# Patient Record
Sex: MALE | Race: BLACK OR AFRICAN AMERICAN | NOT HISPANIC OR LATINO | Employment: FULL TIME | URBAN - METROPOLITAN AREA
[De-identification: names, ages, dates, MRNs, and addresses within clinical notes are randomized per-mention and may not be internally consistent; named-entity substitution may affect disease eponyms.]

---

## 2017-02-02 ENCOUNTER — OFFICE VISIT (OUTPATIENT)
Dept: FAMILY MEDICINE | Facility: CLINIC | Age: 37
End: 2017-02-02

## 2017-02-02 VITALS
BODY MASS INDEX: 25.29 KG/M2 | WEIGHT: 190.8 LBS | HEIGHT: 73 IN | TEMPERATURE: 97.8 F | HEART RATE: 104 BPM | DIASTOLIC BLOOD PRESSURE: 94 MMHG | OXYGEN SATURATION: 100 % | SYSTOLIC BLOOD PRESSURE: 154 MMHG | RESPIRATION RATE: 18 BRPM

## 2017-02-02 DIAGNOSIS — K86.2 PANCREATIC CYST: ICD-10-CM

## 2017-02-02 DIAGNOSIS — F64.0 GENDER DYSPHORIA IN ADULT: Primary | ICD-10-CM

## 2017-02-02 DIAGNOSIS — I10 BENIGN ESSENTIAL HYPERTENSION: ICD-10-CM

## 2017-02-02 LAB
ALBUMIN SERPL-MCNC: 4.8 MG/DL (ref 3.8–5)
ALP SERPL-CCNC: 54.5 U/L (ref 31.7–110.7)
ALT SERPL-CCNC: 26.1 U/L (ref 0–45)
AST SERPL-CCNC: 22.7 U/L (ref 0–55)
BILIRUB SERPL-MCNC: 0.6 MG/DL (ref 0.2–1.3)
BUN SERPL-MCNC: 8.2 MG/DL (ref 7–21)
CALCIUM SERPL-MCNC: 10 MG/DL (ref 8.5–10.1)
CHLORIDE SERPLBLD-SCNC: 101 MMOL/L (ref 98–110)
CO2 SERPL-SCNC: 31.7 MMOL/L (ref 20–32)
CREAT SERPL-MCNC: 0.9 MG/DL (ref 0.7–1.3)
CREAT UR-MCNC: 63 MG/DL
GFR SERPL CREATININE-BSD FRML MDRD: 101.5 ML/MIN/1.7 M2
GLUCOSE SERPL-MCNC: 105.9 MG'DL (ref 70–99)
MICROALBUMIN UR-MCNC: <5 MG/L
MICROALBUMIN/CREAT UR: NORMAL MG/G CR (ref 0–17)
POTASSIUM SERPL-SCNC: 4.2 MMOL/DL (ref 3.3–4.5)
PROT SERPL-MCNC: 7.8 G/DL (ref 6.8–8.8)
SODIUM SERPL-SCNC: 138.2 MMOL/L (ref 132.6–141.4)
TSH SERPL DL<=0.005 MIU/L-ACNC: 0.8 MU/L (ref 0.4–4)

## 2017-02-02 RX ORDER — LISINOPRIL 10 MG/1
10 TABLET ORAL DAILY
Qty: 30 TABLET | Refills: 1 | Status: SHIPPED | OUTPATIENT
Start: 2017-02-02 | End: 2017-02-28

## 2017-02-02 RX ORDER — FLUTICASONE PROPIONATE 50 MCG
SPRAY, SUSPENSION (ML) NASAL
COMMUNITY
Start: 2017-01-27 | End: 2018-11-20

## 2017-02-02 RX ORDER — SPIRONOLACTONE 100 MG/1
100 TABLET, FILM COATED ORAL
COMMUNITY
Start: 2016-11-22 | End: 2020-11-20

## 2017-02-02 NOTE — MR AVS SNAPSHOT
After Visit Summary   2017    Gordon Stack    MRN: [id number]           Patient Information     Date Of Birth          1980        Visit Information        Provider Department      2017 8:00 AM Pierre Camara DO [facility]        Today's Diagnoses     Gender dysphoria in adult    -  1     Pancreatic cyst         Benign essential hypertension            Follow-ups after your visit        Who to contact     Please call your clinic at [phone] to:    Ask questions about your health    Make or cancel appointments    Discuss your medicines    Learn about your test results    Speak to your doctor   If you have compliments or concerns about an experience at your clinic, or if you wish to file a complaint, please contact [facility] Patient Relations at [phone] or email us at [email or web address]         Additional Information About Your Visit        MyChart Information     Spindlet is an electronic gateway that provides easy, online access to your medical records. With [employer]od[employer], you can request a clinic appointment, read your test results, renew a prescription or communicate with your care team.     To sign up for Spindlet visit the website at [email or web address]   You will be asked to enter the access code listed below, as well as some personal information. Please follow the directions to create your username and password.     Your access code is: [email or web address]  Expires: 5/3/2017  8:56 AM     Your access code will  in 90 days. If you need help or a new code, please contact your [facility] or call [phone] for assistance.        Care EveryWhere ID     This is your Care EveryWhere ID. This could be used by other organizations to access your Center medical records  [id number]        Your Vitals Were     Pulse Temperature Respirations Height BMI (Body Mass Index) Pulse Oximetry     "104 97.8  F (36.6  C) (Oral) 18 6' 1\" (185.4 cm) 25.18 kg/m2 100%       Blood Pressure from Last 3 Encounters:   02/02/17 154/94   08/28/16 127/80    Weight from Last 3 Encounters:   02/02/17 190 lb 12.8 oz (86.546 kg)   08/27/16 194 lb 0.1 oz (88 kg)              We Performed the Following     Albumin Random Urine Quantitative     Comprehensive Metabolic Panel ([facility])     TSH with free T4 reflex          Today's Medication Changes          These changes are accurate as of: 2/2/17  8:56 AM.  If you have any questions, ask your nurse or doctor.               Start taking these medicines.        Dose/Directions    lisinopril 10 MG tablet   Commonly known as:  PRINIVIL/ZESTRIL   Used for:  Benign essential hypertension   Started by:  Pierre Camara DO        Dose:  10 mg   Take 1 tablet (10 mg) by mouth daily   Quantity:  30 tablet   Refills:  1         Stop taking these medicines if you haven't already. Please contact your care team if you have questions.     sucralfate 1 GM/10ML suspension   Commonly known as:  CARAFATE   Stopped by:  Pierre Camara DO                Where to get your medicines      These medications were sent to [facility] [place], [place] - [address]   [address] [place] [address]     Phone:  [phone]    - lisinopril 10 MG tablet             Primary Care Provider Office Phone # Fax #    Pierre Camara -[phone]       [facility] [address] [facility] [address]        Thank you!     Thank you for choosing [facility]  for your care. Our goal is always to provide you with excellent care. Hearing back from our patients is one way we can continue to improve our services. Please take a few minutes to complete the written survey that you may receive in the mail after your visit with us. Thank you!             Your Updated Medication List - Protect others around you: Learn how to safely use, store and throw away your " medicines at www.disposemymeds.org.          This list is accurate as of: 2/2/17  8:56 AM.  Always use your most recent med list.                   Brand Name Dispense Instructions for use    fluticasone 50 MCG/ACT spray    FLONASE         lisinopril 10 MG tablet    PRINIVIL/ZESTRIL    30 tablet    Take 1 tablet (10 mg) by mouth daily       spironolactone 100 MG tablet    ALDACTONE     Take 100 mg by mouth

## 2017-02-02 NOTE — PROGRESS NOTES
HPI:       Gordon Stack is a 36 year old who presents for the following  Patient presents with:  Establish Care: New pt, Etablish Care, Blood pressure has been hiugh for 6months+    Taking Estradiol and spironolactone x 4 months.  Estradiol x 2 months. Was started at [facility]. Goal is to transition to female.  On weekend may be dressed more feminine.  Started having gender identity concerns at age 6 or 7.  No mental health issues.  Would like to continue with hormone therapy at [facility].     Here for high blood pressure.  Has had it for 4 or 5 months ago.  Before that it had been high as well but no diagnosis. No difference with home monitoring before or after the estradiol.      No family issues of blood pressure elevation. Brother with asthma. Grandfather with prostate cancer at age 95.     He checks his blood pressure at home and it is 150's over 94.  Lowest has been 130/80.     No froth with urination.    Had some abdominal pain in 8/2016.  Went to the ED and was found to have a small, likely incidental cyst on the pancreas. MRI recommended 8/2017.       Problem, Medication and Allergy Lists were reviewed and are current.  Patient is a ne patient so family, past, and social history reviewed.     Past Medical History   Diagnosis Date     Allergic state      Social History     Social History     Marital Status: Single     Spouse Name: N/A     Number of Children: N/A     Years of Education: N/A     Occupational History     Not on file.     Social History Main Topics     Smoking status: Current Every Day Smoker     Smokeless tobacco: Not on file     Alcohol Use: Yes     Drug Use: No     Sexual Activity: Not Currently     Other Topics Concern     Not on file     Social History Narrative     Family History   Problem Relation Age of Onset     Asthma Brother               Review of Systems:   Review of Systems   Constitutional: Negative for fever and chills.   HENT: Negative for congestion and sore  "throat.    Eyes: Negative for visual disturbance.   Respiratory: Negative for cough and shortness of breath.    Cardiovascular: Negative for chest pain.   Gastrointestinal: Negative for nausea, vomiting, diarrhea and constipation.   Genitourinary: Negative for difficulty urinating.   Musculoskeletal: Negative for back pain.   Skin: Negative for rash.   Neurological: Negative for dizziness, light-headedness and headaches.   Psychiatric/Behavioral: Negative for dysphoric mood.             Physical Exam:   Patient Vitals for the past 24 hrs:   BP Temp Temp src Pulse Resp SpO2 Height Weight   02/02/17 0812 (!) 154/94 mmHg - - - - - - -   02/02/17 0811 (!) 162/94 mmHg 97.8  F (36.6  C) Oral 104 18 100 % 6' 1\" (185.4 cm) 190 lb 12.8 oz (86.546 kg)     Body mass index is 25.18 kg/(m^2).  Vital signs normal except htn     Physical Exam   Constitutional: He is oriented to person, place, and time. He appears well-developed and well-nourished.   HENT:   Head: Normocephalic and atraumatic.   Eyes: Conjunctivae and EOM are normal.   Cardiovascular: Normal rate, regular rhythm and normal heart sounds.  Exam reveals no gallop and no friction rub.    No murmur heard.  Pulmonary/Chest: Effort normal and breath sounds normal. No respiratory distress. He has no wheezes. He has no rales. He exhibits no tenderness.   Abdominal: He exhibits no distension.   Neurological: He is alert and oriented to person, place, and time. No cranial nerve deficit.   Skin: Skin is warm and dry.   Psychiatric: He has a normal mood and affect. His behavior is normal. Judgment and thought content normal.         Results:     Results for orders placed or performed in visit on 02/02/17   Comprehensive Metabolic Panel ([facility])   Result Value Ref Range    Albumin 4.8 3.8 - 5.0 mg/dL    Alkaline Phosphatase 54.5 31.7 - 110.7 U/L    ALT 26.1 0.0 - 45.0 U/L    AST 22.7 0.0 - 55.0 U/L    Bilirubin Total 0.6 0.2 - 1.3 mg/dL    Urea Nitrogen 8.2 7.0 - 21.0 mg/dL    " Calcium 10.0 8.5 - 10.1 mg/dL    Chloride 101.0 98.0 - 110.0 mmol/L    Carbon Dioxide 31.7 20.0 - 32.0 mmol/L    Creatinine 0.9 0.7 - 1.3 mg/dL    Glucose 105.9 (H) 70.0 - 99.0 mg'dL    Potassium 4.2 3.3 - 4.5 mmol/dL    Sodium 138.2 132.6 - 141.4 mmol/L    Protein Total 7.8 6.8 - 8.8 g/dL    GFR Estimate 101.5 mL/min/1.7 m2    GFR Estimate If Black 122.8 mL/min/1.7 m2   TSH with free T4 reflex   Result Value Ref Range    TSH 0.80 0.40 - 4.00 mU/L   Albumin Random Urine Quantitative   Result Value Ref Range    Creatinine Urine 63 mg/dL    Albumin Urine mg/L <5 mg/L    Albumin Urine mg/g Cr Unable to calculate due to low value 0 - 17 mg/g Cr       Assessment and Plan     1. Gender dysphoria in adult  Continue current dosing.  Estradiol unlikely to be involved in bp based on having htn before and after starting.     2. Pancreatic cyst  Will need MRI in 8/17    3. Benign essential hypertension  Normal lab results. Unlikely related to estradiol given presence prior to starting hrt. Will start on lisinopril.  Return 1-2 weeks for bp recheck.   - Comprehensive Metabolic Panel ([facility])  - TSH with free T4 reflex  - lisinopril (PRINIVIL/ZESTRIL) 10 MG tablet; Take 1 tablet (10 mg) by mouth daily  Dispense: 30 tablet; Refill: 1  - Albumin Random Urine Quantitative  There are no discontinued medications.  Options for treatment and follow-up care were reviewed with the patient. Gordon Stack  engaged in the decision making process and verbalized understanding of the options discussed and agreed with the final plan.    Pierre Camara DO

## 2017-02-02 NOTE — PROGRESS NOTES
Preceptor Attestation:   Patient seen and discussed with the resident. Assessment and plan reviewed with resident and agreed upon.   Supervising Physician:  Steven Mcmahan MD MD  [facility]

## 2017-02-06 ASSESSMENT — ENCOUNTER SYMPTOMS
BACK PAIN: 0
DIZZINESS: 0
VOMITING: 0
HEADACHES: 0
DIFFICULTY URINATING: 0
FEVER: 0
COUGH: 0
SORE THROAT: 0
DIARRHEA: 0
CONSTIPATION: 0
SHORTNESS OF BREATH: 0
DYSPHORIC MOOD: 0
CHILLS: 0
NAUSEA: 0
LIGHT-HEADEDNESS: 0

## 2017-02-28 ENCOUNTER — OFFICE VISIT (OUTPATIENT)
Dept: FAMILY MEDICINE | Facility: CLINIC | Age: 37
End: 2017-02-28

## 2017-02-28 VITALS
WEIGHT: 200 LBS | OXYGEN SATURATION: 100 % | TEMPERATURE: 98.7 F | BODY MASS INDEX: 26.39 KG/M2 | HEART RATE: 91 BPM | RESPIRATION RATE: 18 BRPM | DIASTOLIC BLOOD PRESSURE: 87 MMHG | SYSTOLIC BLOOD PRESSURE: 150 MMHG

## 2017-02-28 DIAGNOSIS — I10 BENIGN ESSENTIAL HYPERTENSION: Primary | ICD-10-CM

## 2017-02-28 RX ORDER — LISINOPRIL 10 MG/1
10 TABLET ORAL DAILY
Qty: 30 TABLET | Refills: 1 | Status: SHIPPED | OUTPATIENT
Start: 2017-02-28 | End: 2017-08-23

## 2017-02-28 NOTE — PROGRESS NOTES
HPI:       Gordon Stack is a 36 year old who presents for the following  Patient presents with:  RECHECK: BP    Would like to recheck bp. Has been taking medication well without any problem.  He had elevated blood pressure readings in the past prior to starting estrogen and it has not gone up.  He does not check his blood pressure at home.          Problem, Medication and Allergy Lists were reviewed and are current.  Patient is an established patient of this clinic.         Review of Systems:   Review of Systems   Constitutional: Negative for chills and fever.   HENT: Negative for congestion and sore throat.    Eyes: Negative for visual disturbance.   Respiratory: Negative for cough and shortness of breath.    Cardiovascular: Negative for chest pain.   Gastrointestinal: Negative for constipation, diarrhea, nausea and vomiting.   Genitourinary: Negative for difficulty urinating.   Musculoskeletal: Negative for back pain.   Skin: Negative for rash.   Neurological: Negative for dizziness, light-headedness and headaches.   Psychiatric/Behavioral: Negative for dysphoric mood.             Physical Exam:   Patient Vitals for the past 24 hrs:   BP Temp Temp src Pulse Resp SpO2 Weight   02/28/17 1344 150/87 - - - - - -   02/28/17 1326 152/82 98.7  F (37.1  C) Oral 91 18 100 % 200 lb (90.7 kg)     Body mass index is 26.39 kg/(m^2).  Vitals were reviewed and blood pressure elevated.      Physical Exam   Constitutional: He appears well-developed.   HENT:   Head: Normocephalic and atraumatic.   Eyes: Conjunctivae are normal.   Pulmonary/Chest: No respiratory distress.   Skin: Skin is warm and dry. No erythema.   Psychiatric: He has a normal mood and affect. His behavior is normal. Thought content normal.         Results:     No investigations ordered today    Assessment and Plan     1. Benign essential hypertension  Will get a 24 hour monitor to check for white coat hypertension. Does not appear to be related to the  estrogen therapy and no concerns about continuing that.    - 24 Hour Blood Pressure Monitor - Adult; Future  - lisinopril (PRINIVIL/ZESTRIL) 10 MG tablet; Take 1 tablet (10 mg) by mouth daily  Dispense: 30 tablet; Refill: 1  There are no discontinued medications.  Options for treatment and follow-up care were reviewed with the patient. Gordon Stack  engaged in the decision making process and verbalized understanding of the options discussed and agreed with the final plan.    Pierre Camara, DO

## 2017-02-28 NOTE — MR AVS SNAPSHOT
After Visit Summary   2017    Gordon Stack    MRN: [id number]           Patient Information     Date Of Birth          1980        Visit Information        Provider Department      2017 1:20 PM Pierre Camara DO [facility]        Today's Diagnoses     Benign essential hypertension    -  1       Follow-ups after your visit        Who to contact     Please call your clinic at [phone] to:    Ask questions about your health    Make or cancel appointments    Discuss your medicines    Learn about your test results    Speak to your doctor   If you have compliments or concerns about an experience at your clinic, or if you wish to file a complaint, please contact [facility] Patient Relations at [phone] or email us at [email or web address]         Additional Information About Your Visit        MyChart Information     [employer] is an electronic gateway that provides easy, online access to your medical records. With [employer], you can request a clinic appointment, read your test results, renew a prescription or communicate with your care team.     To sign up for [employer] visit the website at [email or web address]   You will be asked to enter the access code listed below, as well as some personal information. Please follow the directions to create your username and password.     Your access code is: [email or web address]  Expires: 5/3/2017  8:56 AM     Your access code will  in 90 days. If you need help or a new code, please contact your [facility] or call [phone] for assistance.        Care EveryWhere ID     This is your Care EveryWhere ID. This could be used by other organizations to access your [facility] medical records  [id number]        Your Vitals Were     Pulse Temperature Respirations Pulse Oximetry BMI (Body Mass Index)       91 98.7  F (37.1  C) (Oral) 18 100% 26.39 kg/m2        Blood  Pressure from Last 3 Encounters:   02/28/17 150/87   02/02/17 (!) 154/94   08/28/16 127/80    Weight from Last 3 Encounters:   02/28/17 200 lb (90.7 kg)   02/02/17 190 lb 12.8 oz (86.5 kg)   08/27/16 194 lb 0.1 oz (88 kg)                 Where to get your medicines      These medications were sent to [facility] [place], [place] - [address] [facility] [address]     Phone:  [phone]     lisinopril 10 MG tablet          Primary Care Provider Office Phone # Fax #    Pierre Camara [phone]       [facility] [address] [facility] [address]        Thank you!     Thank you for choosing [facility]  for your care. Our goal is always to provide you with excellent care. Hearing back from our patients is one way we can continue to improve our services. Please take a few minutes to complete the written survey that you may receive in the mail after your visit with us. Thank you!             Your Updated Medication List - Protect others around you: Learn how to safely use, store and throw away your medicines at www.disposemymeds.org.          This list is accurate as of: 2/28/17 11:59 PM.  Always use your most recent med list.                   Brand Name Dispense Instructions for use    fluticasone 50 MCG/ACT spray    FLONASE         lisinopril 10 MG tablet    PRINIVIL/ZESTRIL    30 tablet    Take 1 tablet (10 mg) by mouth daily       spironolactone 100 MG tablet    ALDACTONE     Take 100 mg by mouth

## 2017-02-28 NOTE — PROGRESS NOTES
Preceptor Attestation:   Patient seen and discussed with the resident. Assessment and plan reviewed with resident and agreed upon.   Supervising Physician:  Gisele Jordan MD  [facility]

## 2017-03-07 ASSESSMENT — ENCOUNTER SYMPTOMS
DYSPHORIC MOOD: 0
DIFFICULTY URINATING: 0
DIZZINESS: 0
FEVER: 0
CHILLS: 0
VOMITING: 0
LIGHT-HEADEDNESS: 0
HEADACHES: 0
SORE THROAT: 0
CONSTIPATION: 0
BACK PAIN: 0
SHORTNESS OF BREATH: 0
DIARRHEA: 0
NAUSEA: 0
COUGH: 0

## 2017-03-08 NOTE — PATIENT INSTRUCTIONS
BP Monitor referral  [phone]  Contacted patient twice and left 2 msg's with direct contact info- haven't heard back anything yet!           Thanks!     H     Sent letter to patient home

## 2017-03-17 ENCOUNTER — OFFICE VISIT (OUTPATIENT)
Dept: FAMILY MEDICINE | Facility: CLINIC | Age: 37
End: 2017-03-17

## 2017-03-17 VITALS
OXYGEN SATURATION: 99 % | SYSTOLIC BLOOD PRESSURE: 147 MMHG | TEMPERATURE: 98.3 F | DIASTOLIC BLOOD PRESSURE: 82 MMHG | RESPIRATION RATE: 18 BRPM | BODY MASS INDEX: 26.64 KG/M2 | HEIGHT: 73 IN | HEART RATE: 81 BPM | WEIGHT: 201 LBS

## 2017-03-17 DIAGNOSIS — F64.0 GENDER DYSPHORIA IN ADULT: ICD-10-CM

## 2017-03-17 DIAGNOSIS — R14.3 FLATULENCE, ERUCTATION AND GAS PAIN: Primary | ICD-10-CM

## 2017-03-17 DIAGNOSIS — R14.1 FLATULENCE, ERUCTATION AND GAS PAIN: Primary | ICD-10-CM

## 2017-03-17 DIAGNOSIS — K59.00 CONSTIPATION, UNSPECIFIED CONSTIPATION TYPE: ICD-10-CM

## 2017-03-17 DIAGNOSIS — R14.2 FLATULENCE, ERUCTATION AND GAS PAIN: Primary | ICD-10-CM

## 2017-03-17 RX ORDER — POLYETHYLENE GLYCOL 3350 17 G/17G
1 POWDER, FOR SOLUTION ORAL 2 TIMES DAILY PRN
Qty: 510 G | Refills: 1 | Status: SHIPPED | OUTPATIENT
Start: 2017-03-17 | End: 2019-11-19

## 2017-03-17 RX ORDER — SIMETHICONE 125 MG
1 CAPSULE ORAL 4 TIMES DAILY PRN
Qty: 120 CAPSULE | Refills: 0 | Status: SHIPPED | OUTPATIENT
Start: 2017-03-17 | End: 2019-11-19

## 2017-03-17 RX ORDER — ESTRADIOL 2 MG/1
2 TABLET ORAL DAILY
Qty: 30 TABLET | Refills: 0
Start: 2017-03-17 | End: 2020-10-20

## 2017-03-17 ASSESSMENT — ENCOUNTER SYMPTOMS
SHORTNESS OF BREATH: 0
VOMITING: 0
CONSTIPATION: 1
FEVER: 0
RECTAL PAIN: 0
NAUSEA: 1
COUGH: 0
ABDOMINAL DISTENTION: 1
DYSURIA: 0
ABDOMINAL PAIN: 1
DIARRHEA: 0
CHILLS: 0
BLOOD IN STOOL: 0

## 2017-03-17 NOTE — MR AVS SNAPSHOT
After Visit Summary   3/17/2017    Gordon Stack    MRN: [id number]           Patient Information     Date Of Birth          1980        Visit Information        Provider Department      3/17/2017 8:00 AM Gisele Camara MD [facility]        Today's Diagnoses     Flatulence, eructation and gas pain    -  1    Constipation, unspecified constipation type          Care Instructions    Here is the plan from today's visit    1. Flatulence, eructation and gas pain  - Simethicone 125 MG CAPS; Take 1 capsule by mouth 4 times daily as needed  Dispense: 120 capsule; Refill: 0    2. Constipation, unspecified constipation type  - polyethylene glycol (MIRALAX) powder; Take 17 g (1 capful) by mouth 2 times daily as needed for constipation  Dispense: 510 g; Refill: 1      Please call or return to clinic in next week if your symptoms don't go away.    Follow up plan  Please make a clinic appointment for follow up with me (GISELE CAMARA) or Pierre Camara as needed for follow-up.      Constipation (Adult)  Constipation means that you have bowel movements that are less frequent than usual. Stools often become very hard and difficult to pass.  Constipation is very common. At some point in life it affects almost everyone. Since everyone's bowel habits are different, what is constipation to one person may not be to another. Your healthcare provider may do tests to diagnose constipation. It depends on what he or she finds when evaluating you.    Symptoms of constipation include:    Abdominal pain    Bloating    Vomiting    Painful bowel movements    Itching, swelling, bleeding, or pain around the anus  Causes  Constipation can have many causes. These include:    Diet low in fiber    Too much dairy    Not drinking enough liquids    Lack of exercise or physical activity. This is especially true for older adults.    Changes in lifestyle or daily routine, including pregnancy, aging,  work, and travel    Frequent use or misuse of laxatives    Ignoring the urge to have a bowel movement or delaying it until later    Medicines, such as certain prescription pain medicines, iron supplements, antacids, certain antidepressants, and calcium supplements    Diseases like irritable bowel syndrome, bowel obstructions, stroke, diabetes, thyroid disease, Parkinson disease, hemorrhoids, and colon cancer  Complications  Potential complications of constipation can include:    Hemorrhoids    Rectal bleeding from hemorrhoids or anal fissures (skin tears)    Hernias    Dependency on laxatives    Chronic constipation    Fecal impaction    Bowel obstruction or perforation  Home care  All treatment should be done after talking with your healthcare provider. This is especially true if you have another medical problems, are taking prescription medicines, or are an older adult. Treatment most often involves lifestyle changes. You may also need medicines. Your healthcare provider will tell you which will work best for you. Follow the advice below to help avoid this problem in the future.  Lifestyle changes  These lifestyle changes can help prevent constipation:    Diet. Eat a high-fiber diet, with fresh fruit and vegetables, and reduce dairy intake, meats, and processed foods    Fluids. It's important to get enough fluids each day. Drink plenty of water when you eat more fiber. If you are on diet that limits the amount of fluid you can have, talk about this with your healthcare provider.    Regular exercise. Check with your healthcare provider first.  Medications  Take any medicines as directed. Some laxatives are safe to use only every now and then. Others can be taken on a regular basis. Talk with your doctor or pharmacist if you have questions.  Prescription pain medicines can cause constipation. If you are taking this kind of medicine, ask your healthcare provider if you should also take a stool softener.  Medicines  you may take to treat constipation include:    Fiber supplements    Stool softeners    Laxatives    Enemas    Rectal suppositories  Follow-up care  Follow up with your healthcare provider if symptoms don't get better in the next few days. You may need to have more tests or see a specialist.  Call 911  Call 911 if any of these occur:    Trouble breathing    Stiff, rigid abdomen that is severely painful to touch    Confusion    Fainting or loss of consciousness    Rapid heart rate    Chest pain  When to seek medical advice  Call your healthcare provider right away if any of these occur:    Fever over 100.4 F (38 C)    Failure to resume normal bowel movements    Pain in your abdomen or back gets worse    Nausea or vomiting    Swelling in your abdomen    Blood in the stool    Black, tarry stool    Involuntary weight loss    Weakness    2030-2441 [employer]. [address] [place] [address], [place] [address]. All rights reserved. This information is not intended as a substitute for professional medical care. Always follow your healthcare professional's instructions.          Thank you for coming to [facility] today.  Lab Testing:  **If you had lab testing today and your results are reassuring or normal they will be mailed to you or sent through [employer] within 7 days.   **If the lab tests need quick action we will call you with the results.  The phone number we will call with results is # [phone] (home) none (work). If this is not the best number please call our clinic and change the number.  Medication Refills:  If you need any refills please call your pharmacy and they will contact us.   If you need to  your refill at a new pharmacy, please contact the new pharmacy directly. The new pharmacy will help you get your medications transferred faster.   Scheduling:  If you have any concerns about today's visit or wish to schedule another appointment please call our office during normal business hours  [phone] (8-5:00 M-F)  If a referral was made to a [facility] and you don't get a call from central scheduling please call [phone].  If a Mammogram was ordered for you at [facility] call [phone] to schedule or change your appointment.  If you had an XRay/CT/Ultrasound/MRI ordered the number is [phone] to schedule or change your radiology appointment.   Medical Concerns:  If you have urgent medical concerns please call [phone] at any time of the day.          Follow-ups after your visit        Who to contact     Please call your clinic at [phone] to:    Ask questions about your health    Make or cancel appointments    Discuss your medicines    Learn about your test results    Speak to your doctor   If you have compliments or concerns about an experience at your clinic, or if you wish to file a complaint, please contact [facility] Patient Relations at [phone] or email us at [email or web address]         Additional Information About Your Visit        [employer] Information     [employer] is an electronic gateway that provides easy, online access to your medical records. With [employer], you can request a clinic appointment, read your test results, renew a prescription or communicate with your care team.     To sign up for [employer] visit the website at [email or web address]   You will be asked to enter the access code listed below, as well as some personal information. Please follow the directions to create your username and password.     Your access code is: [email or web address]  Expires: 5/3/2017  9:56 AM     Your access code will  in 90 days. If you need help or a new code, please contact your [facility] or call [phone] for assistance.        Care EveryWhere ID     This is your Care EveryWhere ID. This could be used by other organizations to access your [facility]  "records  [id number]        Your Vitals Were     Pulse Temperature Respirations Height Pulse Oximetry BMI (Body Mass Index)    81 98.3  F (36.8  C) (Oral) 18 6' 1\" (185.4 cm) 99% 26.52 kg/m2       Blood Pressure from Last 3 Encounters:   03/17/17 147/82   02/28/17 150/87   02/02/17 (!) 154/94    Weight from Last 3 Encounters:   03/17/17 201 lb (91.2 kg)   02/28/17 200 lb (90.7 kg)   02/02/17 190 lb 12.8 oz (86.5 kg)              Today, you had the following     No orders found for display         Today's Medication Changes          These changes are accurate as of: 3/17/17  8:38 AM.  If you have any questions, ask your nurse or doctor.               Start taking these medicines.        Dose/Directions    polyethylene glycol powder   Commonly known as:  MIRALAX   Used for:  Constipation, unspecified constipation type   Started by:  Gisele Camara MD        Dose:  1 capful   Take 17 g (1 capful) by mouth 2 times daily as needed for constipation   Quantity:  510 g   Refills:  1       Simethicone 125 MG Caps   Used for:  Flatulence, eructation and gas pain   Started by:  Gisele Camara MD        Dose:  1 capsule   Take 1 capsule by mouth 4 times daily as needed   Quantity:  120 capsule   Refills:  0            Where to get your medicines      These medications were sent to [facility] [place], [place] - [address]   [address] [place] [id number]     Phone:  [phone]     polyethylene glycol powder    Simethicone 125 MG Caps                Primary Care Provider Office Phone # Fax #    Pierre DO Jax [phone]       [facility] [address] [facility] [address]        Thank you!     Thank you for choosing [facility]  for your care. Our goal is always to provide you with excellent care. Hearing back from our patients is one way we can continue to improve our services. Please take a few minutes to complete the written survey that you " may receive in the mail after your visit with us. Thank you!             Your Updated Medication List - Protect others around you: Learn how to safely use, store and throw away your medicines at www.disposemymeds.org.          This list is accurate as of: 3/17/17  8:38 AM.  Always use your most recent med list.                   Brand Name Dispense Instructions for use    fluticasone 50 MCG/ACT spray    FLONASE         lisinopril 10 MG tablet    PRINIVIL/ZESTRIL    30 tablet    Take 1 tablet (10 mg) by mouth daily       polyethylene glycol powder    MIRALAX    510 g    Take 17 g (1 capful) by mouth 2 times daily as needed for constipation       Simethicone 125 MG Caps     120 capsule    Take 1 capsule by mouth 4 times daily as needed       spironolactone 100 MG tablet    ALDACTONE     Take 100 mg by mouth

## 2017-03-17 NOTE — PROGRESS NOTES
Preceptor Attestation:   Patient seen and discussed with the resident. Assessment and plan reviewed with resident and agreed upon.   Supervising Physician:  Ganga Cee MD  [facility]

## 2017-03-17 NOTE — PATIENT INSTRUCTIONS
Here is the plan from today's visit    1. Flatulence, eructation and gas pain  - Simethicone 125 MG CAPS; Take 1 capsule by mouth 4 times daily as needed  Dispense: 120 capsule; Refill: 0    2. Constipation, unspecified constipation type  - polyethylene glycol (MIRALAX) powder; Take 17 g (1 capful) by mouth 2 times daily as needed for constipation  Dispense: 510 g; Refill: 1      Please call or return to clinic in next week if your symptoms don't go away.    Follow up plan  Please make a clinic appointment for follow up with me (ELIO CAMARA) or Pierre Camara as needed for follow-up.      Constipation (Adult)  Constipation means that you have bowel movements that are less frequent than usual. Stools often become very hard and difficult to pass.  Constipation is very common. At some point in life it affects almost everyone. Since everyone's bowel habits are different, what is constipation to one person may not be to another. Your healthcare provider may do tests to diagnose constipation. It depends on what he or she finds when evaluating you.    Symptoms of constipation include:    Abdominal pain    Bloating    Vomiting    Painful bowel movements    Itching, swelling, bleeding, or pain around the anus  Causes  Constipation can have many causes. These include:    Diet low in fiber    Too much dairy    Not drinking enough liquids    Lack of exercise or physical activity. This is especially true for older adults.    Changes in lifestyle or daily routine, including pregnancy, aging, work, and travel    Frequent use or misuse of laxatives    Ignoring the urge to have a bowel movement or delaying it until later    Medicines, such as certain prescription pain medicines, iron supplements, antacids, certain antidepressants, and calcium supplements    Diseases like irritable bowel syndrome, bowel obstructions, stroke, diabetes, thyroid disease, Parkinson disease, hemorrhoids, and colon  cancer  Complications  Potential complications of constipation can include:    Hemorrhoids    Rectal bleeding from hemorrhoids or anal fissures (skin tears)    Hernias    Dependency on laxatives    Chronic constipation    Fecal impaction    Bowel obstruction or perforation  Home care  All treatment should be done after talking with your healthcare provider. This is especially true if you have another medical problems, are taking prescription medicines, or are an older adult. Treatment most often involves lifestyle changes. You may also need medicines. Your healthcare provider will tell you which will work best for you. Follow the advice below to help avoid this problem in the future.  Lifestyle changes  These lifestyle changes can help prevent constipation:    Diet. Eat a high-fiber diet, with fresh fruit and vegetables, and reduce dairy intake, meats, and processed foods    Fluids. It's important to get enough fluids each day. Drink plenty of water when you eat more fiber. If you are on diet that limits the amount of fluid you can have, talk about this with your healthcare provider.    Regular exercise. Check with your healthcare provider first.  Medications  Take any medicines as directed. Some laxatives are safe to use only every now and then. Others can be taken on a regular basis. Talk with your doctor or pharmacist if you have questions.  Prescription pain medicines can cause constipation. If you are taking this kind of medicine, ask your healthcare provider if you should also take a stool softener.  Medicines you may take to treat constipation include:    Fiber supplements    Stool softeners    Laxatives    Enemas    Rectal suppositories  Follow-up care  Follow up with your healthcare provider if symptoms don't get better in the next few days. You may need to have more tests or see a specialist.  Call 911  Call 911 if any of these occur:    Trouble breathing    Stiff, rigid abdomen that is severely painful to  touch    Confusion    Fainting or loss of consciousness    Rapid heart rate    Chest pain  When to seek medical advice  Call your healthcare provider right away if any of these occur:    Fever over 100.4 F (38 C)    Failure to resume normal bowel movements    Pain in your abdomen or back gets worse    Nausea or vomiting    Swelling in your abdomen    Blood in the stool    Black, tarry stool    Involuntary weight loss    Weakness    7039-9657 [employer]. [address] [place] [address], [place] [address]. All rights reserved. This information is not intended as a substitute for professional medical care. Always follow your healthcare professional's instructions.          Thank you for coming to [facility] today.  Lab Testing:  **If you had lab testing today and your results are reassuring or normal they will be mailed to you or sent through [facility] [facility] within 7 days.   **If the lab tests need quick action we will call you with the results.  The phone number we will call with results is # [phone] (home) none (work). If this is not the best number please call our clinic and change the number.  Medication Refills:  If you need any refills please call your pharmacy and they will contact us.   If you need to  your refill at a new pharmacy, please contact the new pharmacy directly. The new pharmacy will help you get your medications transferred faster.   Scheduling:  If you have any concerns about today's visit or wish to schedule another appointment please call our office during normal business hours [phone] (8-5:00 M-F)  If a referral was made to a [facility] and you don't get a call from central scheduling please call [phone].  If a Mammogram was ordered for you at [facility] call [phone] to schedule or change your appointment.  If you had an XRay/CT/Ultrasound/MRI ordered the number is [phone] to schedule or change your radiology appointment.    Medical Concerns:  If you have urgent medical concerns please call [phone] at any time of the day.

## 2017-03-17 NOTE — PROGRESS NOTES
"      HPI:       Gordon Stack is a 36 year old who presents for the following  Patient presents with:  Pain: Constipation, Gas, Cramps, Bloating X1week and 1/2    Constipation/Gas/Bloating     Onset: 1.5 weeks    Description:   Frequency of bowel movements: daily.  Stool consistency: small, hard caliber    Progression of Symptoms:  same    Accompanying Signs & Symptoms:  Abdominal pain (cramping?): mild-moderate  Blood in stool: no   Rectal pain: no   Nausea/vomiting: YES- mild nausea at times  Weight loss or gain: no    History:   History of abdominal surgery: no     Precipitating factors:   Recent use of narcotics, anticholinergics, calcium channel blockers, antacids, or iron supplements: no   Chronic Laxative Use: no but patient has used Tums, Mylanta       Therapies Tried and outcome: Has tried tums, Pedialyte, Mylanta, Gas-ex    Patient stopped smoking approximately 5 weeks prior, thinks his abdominal symptoms may be related to quitting smoking.  Patient feels bloated and constipated.  Abdominal pain is mild-moderate.  No blood in stool.  Patient has had nausea at times from bloating/constipation, but no vomiting.        Problem, Medication and Allergy Lists were reviewed and are current.  Patient is an established patient of this clinic.         Review of Systems:   Review of Systems   Constitutional: Negative for chills and fever.   HENT: Negative for congestion.    Respiratory: Negative for cough and shortness of breath.    Cardiovascular: Negative for chest pain.   Gastrointestinal: Positive for abdominal distention, abdominal pain, constipation and nausea. Negative for blood in stool, diarrhea, rectal pain and vomiting.   Genitourinary: Negative for dysuria.             Physical Exam:   Patient Vitals for the past 24 hrs:   BP Temp Temp src Pulse Resp SpO2 Height Weight   03/17/17 0813 147/82 98.3  F (36.8  C) Oral 81 18 99 % 6' 1\" (185.4 cm) 201 lb (91.2 kg)     Body mass index is 26.52 " kg/(m^2).  Vitals were reviewed and were normal     Physical Exam   Constitutional: He is oriented to person, place, and time. He appears well-developed. No distress.   HENT:   Head: Normocephalic.   Eyes: Conjunctivae are normal.   Neck: Normal range of motion. Neck supple.   Cardiovascular: Normal rate.    Pulmonary/Chest: Effort normal.   Abdominal: Soft. Bowel sounds are normal. He exhibits no distension. There is no tenderness. There is no rebound and no guarding.   Musculoskeletal: Normal range of motion. He exhibits no edema or tenderness.   Neurological: He is alert and oriented to person, place, and time.   Skin: Skin is warm and dry.   Psychiatric: He has a normal mood and affect. His behavior is normal. Judgment and thought content normal.   Vitals reviewed.        Results:     No labs ordered today.  Assessment and Plan     Gordon was seen today for abdominal pain.  Patient's symptoms seem consistent with gas and constipation.  Abdominal exam is benign, making pancreatitis, appendicitis, PUD, cholecystitis less likely.   Discussed increasing fiber, increasing liquids, increasing exercise.  Will empirically treat with simethicone and miralax for now.  If symptoms do not improve, patient advised to RTC.    Diagnoses and all orders for this visit:    Flatulence, eructation and gas pain  -     Simethicone 125 MG CAPS; Take 1 capsule by mouth 4 times daily as needed    Constipation, unspecified constipation type  -     polyethylene glycol (MIRALAX) powder; Take 17 g (1 capful) by mouth 2 times daily as needed for constipation      There are no discontinued medications.  Options for treatment and follow-up care were reviewed with the patient. Gordon Stack  engaged in the decision making process and verbalized understanding of the options discussed and agreed with the final plan.    Gisele Camara M.D.  PGY-2, Family Medicine

## 2017-04-11 ENCOUNTER — OFFICE VISIT (OUTPATIENT)
Dept: FAMILY MEDICINE | Facility: CLINIC | Age: 37
End: 2017-04-11

## 2017-04-11 VITALS
DIASTOLIC BLOOD PRESSURE: 83 MMHG | WEIGHT: 195.2 LBS | BODY MASS INDEX: 25.87 KG/M2 | RESPIRATION RATE: 18 BRPM | TEMPERATURE: 98.1 F | SYSTOLIC BLOOD PRESSURE: 152 MMHG | OXYGEN SATURATION: 99 % | HEIGHT: 73 IN | HEART RATE: 94 BPM

## 2017-04-11 DIAGNOSIS — R30.0 DYSURIA: ICD-10-CM

## 2017-04-11 DIAGNOSIS — R10.13 DYSPEPSIA: Primary | ICD-10-CM

## 2017-04-11 DIAGNOSIS — R10.13 DYSPEPSIA: ICD-10-CM

## 2017-04-11 LAB
BILIRUBIN UR: NEGATIVE
BLOOD UR: NEGATIVE
GLUCOSE URINE: NEGATIVE
HBA1C MFR BLD: 5.6 % (ref 4.1–5.7)
KETONES UR QL: NEGATIVE
LEUKOCYTE ESTERASE UR: NEGATIVE
NITRITE UR QL STRIP: NEGATIVE
PH UR STRIP: 6 [PH] (ref 5–7)
PROTEIN UR: NEGATIVE
SP GR UR STRIP: <=1.005
UROBILINOGEN UR STRIP-ACNC: NORMAL

## 2017-04-11 ASSESSMENT — ENCOUNTER SYMPTOMS
CONSTIPATION: 0
FREQUENCY: 1
HEADACHES: 0
DYSPHORIC MOOD: 0
CHILLS: 0
DIARRHEA: 0
SHORTNESS OF BREATH: 0
LIGHT-HEADEDNESS: 0
VOMITING: 0
NAUSEA: 0
FEVER: 0
DIZZINESS: 0

## 2017-04-11 NOTE — PROGRESS NOTES
Preceptor Attestation:   Patient seen and discussed with the resident. Assessment and plan reviewed with resident and agreed upon.   Supervising Physician:  Judson Boyce MD  [facility]

## 2017-04-11 NOTE — LETTER
April 13, 2017      Gordon Stack  [address] [facility] [address]        Dear Gordon,    Thank you for getting your care at [facility]. Please see below for your test results. Your urine culture is negative.      Resulted Orders   Hemoglobin A1c ([facility])   Result Value Ref Range    Hemoglobin A1C 5.6 4.1 - 5.7 %   Urinalysis (UA) ([facility])   Result Value Ref Range    Specific Gravity Urine <=1.005 1.005 - 1.030    pH Urine 6.0 4.5 - 8.0    Leukocyte Esterase UR Negative NEGATIVE    Nitrite Urine Negative NEGATIVE    Protein UR Negative NEGATIVE    Glucose Urine Negative NEGATIVE    Ketones Urine Negative NEGATIVE    Urobilinogen mg/dL 0.2 E.U./dL 0.2 E.U./dL    Bilirubin UR Negative NEGATIVE    Blood UR Negative NEGATIVE   Urine Culture Aerobic Bacterial   Result Value Ref Range    Specimen Description Midstream Urine     Special Requests Specimen received in preservative     Culture Micro No growth     Micro Report Status FINAL 04/12/2017            Sincerely,    Pierre Camara,

## 2017-04-11 NOTE — PATIENT INSTRUCTIONS
Take the ranitidine 1 tab every morning 30 minutes before eating. Do not start until we have the stool sample    I encourage you to do the blood pressure testing at home and if you see a change, let me know.

## 2017-04-11 NOTE — PROGRESS NOTES
"      HPI:       Gordon Stack is a 36 year old who presents for the following  Patient presents with:  Gastrointestinal Problem: after meals, ache, 4/10 pain, intermit, 1 month,     Has some ache in the epigastrum.     Has some improvement with the constipation treatment.      Having a lot of urination. Has to get up in the evening.  Is urinating twice as much as he was before.       No headaches. No change in vision. Has been checking his blood pressure at home.  130/85 usually at home.            Problem, Medication and Allergy Lists were reviewed and are current.  Patient is an established patient of this clinic.         Review of Systems:   Review of Systems   Constitutional: Negative for chills and fever.   Respiratory: Negative for shortness of breath.    Cardiovascular: Negative for chest pain.   Gastrointestinal: Negative for constipation, diarrhea, nausea and vomiting.   Genitourinary: Positive for frequency.   Neurological: Negative for dizziness, light-headedness and headaches.   Psychiatric/Behavioral: Negative for dysphoric mood.             Physical Exam:   Patient Vitals for the past 24 hrs:   BP Temp Temp src Pulse Resp SpO2 Height Weight   04/11/17 1018 152/83 98.1  F (36.7  C) Tympanic 94 18 99 % 6' 1\" (185.4 cm) 195 lb 3.2 oz (88.5 kg)     Body mass index is 25.75 kg/(m^2).  Vitals were reviewed and were normal     Physical Exam   Constitutional: He appears well-developed.   HENT:   Head: Normocephalic and atraumatic.   Eyes: Conjunctivae are normal.   Pulmonary/Chest: No respiratory distress.   Skin: Skin is warm and dry. No erythema.   Psychiatric: He has a normal mood and affect. His behavior is normal. Thought content normal.         Results:     Results pending  Assessment and Plan     1. Dyspepsia  Continue aggressive bowel regimen.  Will check for h pylori.  Lipase negative in th past. Start ranitidine after stool test complete.    - ranitidine (ZANTAC) 150 MG tablet; Take 1 tablet (150 " mg) by mouth daily  Dispense: 60 tablet; Refill: 1  - H Pylori antigen stool; Future    2. Dysuria  Urinary frequency.  Had UTI 1 year ago.  Will recheck.  Will get A1c because of one elevated glucose in the past and frequency.    - Hemoglobin A1c (LabDAQ)  - Urinalysis (UA) ([facility])  - Urine Culture Aerobic Bacterial  There are no discontinued medications.  Options for treatment and follow-up care were reviewed with the patient. Gordon Stack  engaged in the decision making process and verbalized understanding of the options discussed and agreed with the final plan.    Pierre Camara, DO

## 2017-04-11 NOTE — MR AVS SNAPSHOT
After Visit Summary   2017    Gordon Stack    MRN: [id number]           Patient Information     Date Of Birth          1980        Visit Information        Provider Department      2017 10:20 AM Pierre Camara DO [facility]        Today's Diagnoses     Dyspepsia    -  1    Dysuria          Care Instructions    Take the ranitidine 1 tab every morning 30 minutes before eating. Do not start until we have the stool sample    I encourage you to do the blood pressure testing at home and if you see a change, let me know.          Follow-ups after your visit        Future tests that were ordered for you today     Open Future Orders        Priority Expected Expires Ordered    H Pylori antigen stool Routine  2017            Who to contact     Please call your clinic at [phone] to:    Ask questions about your health    Make or cancel appointments    Discuss your medicines    Learn about your test results    Speak to your doctor   If you have compliments or concerns about an experience at your clinic, or if you wish to file a complaint, please contact [facility] Patient Relations at [phone] or email us at [email or web address]         Additional Information About Your Visit        MyChart Information     Oppat is an electronic gateway that provides easy, online access to your medical records. With NERI, you can request a clinic appointment, read your test results, renew a prescription or communicate with your care team.     To sign up for Oppat visit the website at [email or web address]   You will be asked to enter the access code listed below, as well as some personal information. Please follow the directions to create your username and password.     Your access code is: [email or web address]  Expires: 5/3/2017  9:56 AM     Your access code will  in 90 days. If you need help or a new code, please  "contact your [facility] or call [phone] for assistance.        Care EveryWhere ID     This is your Care EveryWhere ID. This could be used by other organizations to access your [facility] medical records  [id number]        Your Vitals Were     Pulse Temperature Respirations Height Pulse Oximetry BMI (Body Mass Index)    94 98.1  F (36.7  C) (Tympanic) 18 6' 1\" (185.4 cm) 99% 25.75 kg/m2       Blood Pressure from Last 3 Encounters:   04/11/17 152/83   03/17/17 147/82   02/28/17 150/87    Weight from Last 3 Encounters:   04/11/17 195 lb 3.2 oz (88.5 kg)   03/17/17 201 lb (91.2 kg)   02/28/17 200 lb (90.7 kg)              We Performed the Following     Hemoglobin A1c (LabDAQ)     Urinalysis (UA) ([facility])     Urine Culture Aerobic Bacterial          Today's Medication Changes          These changes are accurate as of: 4/11/17 10:39 AM.  If you have any questions, ask your nurse or doctor.               Start taking these medicines.        Dose/Directions    ranitidine 150 MG tablet   Commonly known as:  ZANTAC   Used for:  Dyspepsia   Started by:  Pierre Camara DO        Dose:  150 mg   Take 1 tablet (150 mg) by mouth daily   Quantity:  60 tablet   Refills:  1            Where to get your medicines      These medications were sent to [facility] [place], [place] - [address] [place] [address]     Phone:  [phone]     ranitidine 150 MG tablet                Primary Care Provider Office Phone # Fax #    Pierre Camara [phone]       [facility] [address] [facility] [address]        Thank you!     Thank you for choosing [facility]  for your care. Our goal is always to provide you with excellent care. Hearing back from our patients is one way we can continue to improve our services. Please take a few minutes to complete the written survey that you may receive in the mail after your visit with " us. Thank you!             Your Updated Medication List - Protect others around you: Learn how to safely use, store and throw away your medicines at www.disposemymeds.org.          This list is accurate as of: 4/11/17 10:39 AM.  Always use your most recent med list.                   Brand Name Dispense Instructions for use    estradiol 2 MG tablet    ESTRACE    30 tablet    Take 1 tablet (2 mg) by mouth daily       fluticasone 50 MCG/ACT spray    FLONASE         lisinopril 10 MG tablet    PRINIVIL/ZESTRIL    30 tablet    Take 1 tablet (10 mg) by mouth daily       polyethylene glycol powder    MIRALAX    510 g    Take 17 g (1 capful) by mouth 2 times daily as needed for constipation       ranitidine 150 MG tablet    ZANTAC    60 tablet    Take 1 tablet (150 mg) by mouth daily       Simethicone 125 MG Caps     120 capsule    Take 1 capsule by mouth 4 times daily as needed       spironolactone 100 MG tablet    ALDACTONE     Take 100 mg by mouth

## 2017-04-11 NOTE — LETTER
April 11, 2017      Gordon Stack  [address] [facility] [address]        Dear Gordon,    Thank you for getting your care at [facility]. Please see below for your test results. Your urine sample is normal.     Resulted Orders   Urinalysis (UA) ([facility])   Result Value Ref Range    Specific Gravity Urine <=1.005 1.005 - 1.030    pH Urine 6.0 4.5 - 8.0    Leukocyte Esterase UR Negative NEGATIVE    Nitrite Urine Negative NEGATIVE    Protein UR Negative NEGATIVE    Glucose Urine Negative NEGATIVE    Ketones Urine Negative NEGATIVE    Urobilinogen mg/dL 0.2 E.U./dL 0.2 E.U./dL    Bilirubin UR Negative NEGATIVE    Blood UR Negative NEGATIVE       If you have any concerns about these results please call and leave a message for me or send a Exelis message to the clinic.    Sincerely,    Pierre Camara, DO

## 2017-04-12 LAB
BACTERIA SPEC CULT: NO GROWTH
Lab: NORMAL
MICRO REPORT STATUS: NORMAL
SPECIMEN SOURCE: NORMAL

## 2017-04-13 LAB
H PYLORI AG STL QL IA: ABNORMAL
MICRO REPORT STATUS: ABNORMAL
SPECIMEN SOURCE: ABNORMAL

## 2017-04-20 ENCOUNTER — OFFICE VISIT (OUTPATIENT)
Dept: FAMILY MEDICINE | Facility: CLINIC | Age: 37
End: 2017-04-20

## 2017-04-20 VITALS
OXYGEN SATURATION: 100 % | SYSTOLIC BLOOD PRESSURE: 158 MMHG | HEART RATE: 94 BPM | DIASTOLIC BLOOD PRESSURE: 111 MMHG | TEMPERATURE: 97.9 F | WEIGHT: 199.2 LBS | HEIGHT: 73 IN | RESPIRATION RATE: 20 BRPM | BODY MASS INDEX: 26.4 KG/M2

## 2017-04-20 DIAGNOSIS — A04.8 H. PYLORI INFECTION: Primary | ICD-10-CM

## 2017-04-20 RX ORDER — CLARITHROMYCIN 500 MG
500 TABLET ORAL 2 TIMES DAILY
Qty: 28 TABLET | Refills: 0 | Status: SHIPPED | OUTPATIENT
Start: 2017-04-20 | End: 2017-05-04

## 2017-04-20 RX ORDER — L.ACIDOPH/B.ANIMALIS/B.LONGUM 15B CELL
1 CAPSULE ORAL
Qty: 30 CAPSULE | Refills: 0 | Status: SHIPPED | OUTPATIENT
Start: 2017-04-20 | End: 2017-05-05

## 2017-04-20 RX ORDER — AMOXICILLIN 500 MG/1
1000 CAPSULE ORAL 2 TIMES DAILY
Qty: 56 CAPSULE | Refills: 0 | Status: SHIPPED | OUTPATIENT
Start: 2017-04-20 | End: 2017-05-04

## 2017-04-20 NOTE — Clinical Note
Hi nurses,  Can you call this patient to look at his blood pressure readings at home and try to schedule him for the 24 hour monitor?  ThanksPierre

## 2017-04-20 NOTE — PROGRESS NOTES
"Preceptor Attestation:   Patient seen and discussed with the resident. Assessment and plan reviewed with resident and agreed upon.  Elevated BP - thought to have an element of \"white coat\" issues.  24-hr BP monitoring was ordered in February, but never completed.  Recommend checking serial home BP and close f/u in clinic (ideally every 2 wks) while stabilizing  Supervising Physician:  Gregoria Contreras MD  [facility]    "

## 2017-04-20 NOTE — MR AVS SNAPSHOT
"              After Visit Summary   2017    Gordon Stack    MRN: [id number]           Patient Information     Date Of Birth          1980        Visit Information        Provider Department      2017 2:00 PM Pierre Camara DO [facility]        Today's Diagnoses     H. pylori infection    -  1       Follow-ups after your visit        Who to contact     Please call your clinic at [phone] to:    Ask questions about your health    Make or cancel appointments    Discuss your medicines    Learn about your test results    Speak to your doctor   If you have compliments or concerns about an experience at your clinic, or if you wish to file a complaint, please contact [facility] Patient Relations at [phone] or email us at [email or web address]         Additional Information About Your Visit        MyChart Information     Rosum is an electronic gateway that provides easy, online access to your medical records. With Rosum, you can request a clinic appointment, read your test results, renew a prescription or communicate with your care team.     To sign up for [employer] visit the website at [email or web address]   You will be asked to enter the access code listed below, as well as some personal information. Please follow the directions to create your username and password.     Your access code is: [email or web address]  Expires: 2017  3:35 PM     Your access code will  in 90 days. If you need help or a new code, please contact your [facility] or call [phone] for assistance.        Care EveryWhere ID     This is your Care EveryWhere ID. This could be used by other organizations to access your [facility] medical records  [id number]        Your Vitals Were     Pulse Temperature Respirations Height Pulse Oximetry BMI (Body Mass Index)    94 97.9  F (36.6  C) (Oral) 20 6' 1\" (185.4 cm) 100% 26.28 kg/m2    "    Blood Pressure from Last 3 Encounters:   04/20/17 (!) 158/111   04/11/17 152/83   03/17/17 147/82    Weight from Last 3 Encounters:   04/20/17 199 lb 3.2 oz (90.4 kg)   04/11/17 195 lb 3.2 oz (88.5 kg)   03/17/17 201 lb (91.2 kg)              Today, you had the following     No orders found for display         Today's Medication Changes          These changes are accurate as of: 4/20/17 11:59 PM.  If you have any questions, ask your nurse or doctor.               Start taking these medicines.        Dose/Directions    amoxicillin 500 MG capsule   Commonly known as:  AMOXIL   Used for:  H. pylori infection   Started by:  Pierre Camara DO        Dose:  1000 mg   Take 2 capsules (1,000 mg) by mouth 2 times daily for 14 days   Quantity:  56 capsule   Refills:  0       clarithromycin 500 MG tablet   Commonly known as:  BIAXIN   Used for:  H. pylori infection   Started by:  Pierre Camara DO        Dose:  500 mg   Take 1 tablet (500 mg) by mouth 2 times daily for 14 days   Quantity:  28 tablet   Refills:  0       FLORAJEN3 Caps   Used for:  H. pylori infection   Started by:  Pierre Camara DO        Dose:  1 capsule   Take 1 capsule by mouth 2 times daily for 15 days Start after completing the antibiotic regimen.   Quantity:  30 capsule   Refills:  0       omeprazole 20 MG CR capsule   Commonly known as:  priLOSEC   Used for:  H. pylori infection   Started by:  Pierre Camara DO        Dose:  20 mg   Take 1 capsule (20 mg) by mouth 2 times daily   Quantity:  28 capsule   Refills:  0         Stop taking these medicines if you haven't already. Please contact your care team if you have questions.     ranitidine 150 MG tablet   Commonly known as:  ZANTAC   Stopped by:  Pierre Camara DO                Where to get your medicines      These medications were sent to [facility] [place], [place] - [address] [facility] [address]     Phone:  [phone]     amoxicillin 500 MG capsule     clarithromycin 500 MG tablet    FLORAJEN3 Caps    omeprazole 20 MG CR capsule                Primary Care Provider Office Phone # Fax #    Pierre Camara -[phone]       [facility] [address]  [facility] [address]        Thank you!     Thank you for choosing [facility]  for your care. Our goal is always to provide you with excellent care. Hearing back from our patients is one way we can continue to improve our services. Please take a few minutes to complete the written survey that you may receive in the mail after your visit with us. Thank you!             Your Updated Medication List - Protect others around you: Learn how to safely use, store and throw away your medicines at www.disposemymeds.org.          This list is accurate as of: 4/20/17 11:59 PM.  Always use your most recent med list.                   Brand Name Dispense Instructions for use    amoxicillin 500 MG capsule    AMOXIL    56 capsule    Take 2 capsules (1,000 mg) by mouth 2 times daily for 14 days       clarithromycin 500 MG tablet    BIAXIN    28 tablet    Take 1 tablet (500 mg) by mouth 2 times daily for 14 days       estradiol 2 MG tablet    ESTRACE    30 tablet    Take 1 tablet (2 mg) by mouth daily       FLORAJEN3 Caps     30 capsule    Take 1 capsule by mouth 2 times daily for 15 days Start after completing the antibiotic regimen.       fluticasone 50 MCG/ACT spray    FLONASE         lisinopril 10 MG tablet    PRINIVIL/ZESTRIL    30 tablet    Take 1 tablet (10 mg) by mouth daily       omeprazole 20 MG CR capsule    priLOSEC    28 capsule    Take 1 capsule (20 mg) by mouth 2 times daily       polyethylene glycol powder    MIRALAX    510 g    Take 17 g (1 capful) by mouth 2 times daily as needed for constipation       Simethicone 125 MG Caps     120 capsule    Take 1 capsule by mouth 4 times daily as needed       spironolactone 100 MG tablet    ALDACTONE     Take 100 mg by mouth

## 2017-04-25 ASSESSMENT — ENCOUNTER SYMPTOMS
CONSTIPATION: 0
FEVER: 0
SHORTNESS OF BREATH: 0
DIZZINESS: 0
ABDOMINAL PAIN: 1
CHILLS: 0
DYSPHORIC MOOD: 0
LIGHT-HEADEDNESS: 0
NAUSEA: 1
DIARRHEA: 0
VOMITING: 0
BLOOD IN STOOL: 0
HEADACHES: 0

## 2017-04-25 NOTE — PROGRESS NOTES
"      HPI:       Gordon Stack is a 36 year old who presents for the following  Patient presents with:  RECHECK: Follow up,pt requesting results    Pt positive of h pylori.  Continues to have dyspepsia.  No blood in stool.  No fatigue. He has never had this before and has never been treated for it.      He has had this stomach dyscomfort for a few months.        Problem, Medication and Allergy Lists were reviewed and are current.  Patient is an established patient of this clinic.         Review of Systems:   Review of Systems   Constitutional: Negative for chills and fever.   Respiratory: Negative for shortness of breath.    Cardiovascular: Negative for chest pain.   Gastrointestinal: Positive for abdominal pain and nausea. Negative for blood in stool, constipation, diarrhea and vomiting.   Neurological: Negative for dizziness, light-headedness and headaches.   Psychiatric/Behavioral: Negative for dysphoric mood.             Physical Exam:   BP (!) 158/111  Pulse 94  Temp 97.9  F (36.6  C) (Oral)  Resp 20  Ht 6' 1\" (185.4 cm)  Wt 199 lb 3.2 oz (90.4 kg)  SpO2 100%  BMI 26.28 kg/m2    Body mass index is 26.28 kg/(m^2).  Vitals were reviewed and were normal     Physical Exam   Constitutional: He appears well-developed.   HENT:   Head: Normocephalic and atraumatic.   Eyes: Conjunctivae are normal.   Pulmonary/Chest: No respiratory distress.   Abdominal: There is tenderness (epirgastrum ).   Skin: Skin is warm and dry. No erythema.   Psychiatric: He has a normal mood and affect. His behavior is normal. Thought content normal.         Results:     Results for orders placed or performed in visit on 04/11/17   H Pylori antigen stool   Result Value Ref Range    Specimen Description Feces     H Pylori Antigen (A)      Positive for Helicobacter pylori antigen by enzyme immunoassay. A positive   result indicates the presence of H. pylori antigen.      Micro Report Status FINAL 04/13/2017        Assessment and Plan "     1. H. pylori infection  - omeprazole (PRILOSEC) 20 MG CR capsule; Take 1 capsule (20 mg) by mouth 2 times daily  Dispense: 28 capsule; Refill: 0  - clarithromycin (BIAXIN) 500 MG tablet; Take 1 tablet (500 mg) by mouth 2 times daily for 14 days  Dispense: 28 tablet; Refill: 0  - amoxicillin (AMOXIL) 500 MG capsule; Take 2 capsules (1,000 mg) by mouth 2 times daily for 14 days  Dispense: 56 capsule; Refill: 0  - Probiotic Product (FLORAJEN3) CAPS; Take 1 capsule by mouth 2 times daily for 15 days Start after completing the antibiotic regimen.  Dispense: 30 capsule; Refill: 0    2. Known hypertension (likely white coat)  Will call to check his home readings.       Medications Discontinued During This Encounter   Medication Reason     ranitidine (ZANTAC) 150 MG tablet      Options for treatment and follow-up care were reviewed with the patient. Gordon Stack  engaged in the decision making process and verbalized understanding of the options discussed and agreed with the final plan.    Pierre Camara, DO

## 2017-09-26 ENCOUNTER — OFFICE VISIT (OUTPATIENT)
Dept: FAMILY MEDICINE | Facility: CLINIC | Age: 37
End: 2017-09-26

## 2017-09-26 VITALS
TEMPERATURE: 98.1 F | WEIGHT: 204.8 LBS | HEART RATE: 89 BPM | OXYGEN SATURATION: 98 % | HEIGHT: 74 IN | SYSTOLIC BLOOD PRESSURE: 134 MMHG | DIASTOLIC BLOOD PRESSURE: 83 MMHG | BODY MASS INDEX: 26.28 KG/M2

## 2017-09-26 DIAGNOSIS — Z76.89 ENCOUNTER TO ESTABLISH CARE WITH NEW DOCTOR: ICD-10-CM

## 2017-09-26 DIAGNOSIS — B96.81 DUODENAL ULCER DUE TO HELICOBACTER PYLORI: ICD-10-CM

## 2017-09-26 DIAGNOSIS — K26.9 DUODENAL ULCER DUE TO HELICOBACTER PYLORI: ICD-10-CM

## 2017-09-26 DIAGNOSIS — F64.0 GENDER DYSPHORIA IN ADULT: ICD-10-CM

## 2017-09-26 DIAGNOSIS — I10 BENIGN ESSENTIAL HYPERTENSION: Primary | ICD-10-CM

## 2017-09-26 NOTE — PATIENT INSTRUCTIONS
Thank you for coming to [facility] today.  Lab Testing:  **If you had lab testing today and your results are reassuring or normal they will be mailed to you or sent through [email or web address] within 7 days.   **If the lab tests need quick action we will call you with the results.  The phone number we will call with results is # [phone] (home) none (work). If this is not the best number please call our clinic and change the number.  Medication Refills:  If you need any refills please call your pharmacy and they will contact us.   If you need to  your refill at a new pharmacy, please contact the new pharmacy directly. The new pharmacy will help you get your medications transferred faster.   Scheduling:  If you have any concerns about today's visit or wish to schedule another appointment please call our office during normal business hours [phone] (8-5:00 M-F)  If a referral was made to a [facility] and you don't get a call from central scheduling please call [phone].  If a Mammogram was ordered for you at [facility] call [phone] to schedule or change your appointment.  If you had an XRay/CT/Ultrasound/MRI ordered the number is [phone] to schedule or change your radiology appointment.   Medical Concerns:  If you have urgent medical concerns please call [phone] at any time of the day.

## 2017-09-26 NOTE — MR AVS SNAPSHOT
After Visit Summary   9/26/2017    Gordon Stack    MRN: [id number]           Patient Information     Date Of Birth          1980        Visit Information        Provider Department      9/26/2017 4:20 PM Brad Wright MD [facility]        Today's Diagnoses     Hypertension    -  1    Duodenal ulcer due to Helicobacter pylori        Gender dysphoria in adult          Care Instructions    Here is the plan from today's visit    1. Hypertension  JNC-8 criteria indicate blood pressure management for 140/90 and target down to 140/90 unless cardiac disease (seperate recommendation by the ACC/AHA).  No known indication for tighter control for someone on hormone therapy.  Pressures today were 130s/80s, would not need medication adjustment automatically.  - As per our guidelines would not  unless symptomatic or other concerns  - Feel free to contact us if any other concerns, additional information, if any guidelines we should be aware of.    2. Duodenal ulcer due to Helicobacter pylori  - H Pylori antigen stool; Future  - For test of cure    3. Gender dysphoria in adult    Please call or return to clinic if your symptoms don't go away.    Follow up plan  Please make a clinic appointment for follow up with me (BRAD WRIGHT) in February 2018.    Thank you for coming to [facility] today.  Lab Testing:  **If you had lab testing today and your results are reassuring or normal they will be mailed to you or sent through [employer] within 7 days.   **If the lab tests need quick action we will call you with the results.  The phone number we will call with results is # [phone] (home) none (work). If this is not the best number please call our clinic and change the number.  Medication Refills:  If you need any refills please call your pharmacy and they will contact us.   If you need to  your refill at a new pharmacy, please contact the new pharmacy  directly. The new pharmacy will help you get your medications transferred faster.   Scheduling:  If you have any concerns about today's visit or wish to schedule another appointment please call our office during normal business hours [phone] (8-5:00 M-F)  If a referral was made to a [facility] and you don't get a call from central scheduling please call [phone].  If a Mammogram was ordered for you at [facility] call [phone] to schedule or change your appointment.  If you had an XRay/CT/Ultrasound/MRI ordered the number is [phone] to schedule or change your radiology appointment.   Medical Concerns:  If you have urgent medical concerns please call [phone] at any time of the day.            Follow-ups after your visit        Future tests that were ordered for you today     Open Future Orders        Priority Expected Expires Ordered    H Pylori antigen stool Routine  10/26/2017 9/26/2017            Who to contact     Please call your clinic at [phone] to:    Ask questions about your health    Make or cancel appointments    Discuss your medicines    Learn about your test results    Speak to your doctor   If you have compliments or concerns about an experience at your clinic, or if you wish to file a complaint, please contact [facility] Patient Relations at [phone] or email us at [email or web address]         Additional Information About Your Visit        MyChart Information     [employer] is an electronic gateway that provides easy, online access to your medical records. With [employer], you can request a clinic appointment, read your test results, renew a prescription or communicate with your care team.     To sign up for [employer] visit the website at [email or web address]   You will be asked to enter the access code listed below, as well as some personal information. Please follow the directions to create your  "username and password.     Your access code is: [email or web address]  Expires: 2017  4:40 PM     Your access code will  in 90 days. If you need help or a new code, please contact your [facility] or call [phone] for assistance.        Care EveryWhere ID     This is your Care EveryWhere ID. This could be used by other organizations to access your [facility] medical records  [id number]        Your Vitals Were     Pulse Temperature Height Pulse Oximetry BMI (Body Mass Index)       89 98.1  F (36.7  C) (Oral) 6' 1.75\" (187.3 cm) 98% 26.47 kg/m2        Blood Pressure from Last 3 Encounters:   17 134/83   17 (!) 158/111   17 152/83    Weight from Last 3 Encounters:   17 204 lb 12.8 oz (92.9 kg)   17 199 lb 3.2 oz (90.4 kg)   17 195 lb 3.2 oz (88.5 kg)               Primary Care Provider Office Phone # Fax #    Brad Wright [phone]       [facility] [address] [address]        Equal Access to Services     RONEY VALLADARES AH: Hadii clair ku hadasho Soomaali, waaxda luqadaha, qaybta kaalmada adeegyada, waxay idiin haystephanie pires. So [facility] [phone].    ATENCIÓN: Si habla español, tiene a adam disposición servicios gratuitos de asistencia lingüística. irais al [phone].    We comply with applicable federal civil rights laws and [place] laws. We do not discriminate on the basis of race, color, national origin, age, disability sex, sexual orientation or gender identity.            Thank you!     Thank you for choosing [facility]  for your care. Our goal is always to provide you with excellent care. Hearing back from our patients is one way we can continue to improve our services. Please take a few minutes to complete the written survey that you may receive in the mail after your visit with us. Thank you!             Your Updated Medication List - Protect " others around you: Learn how to safely use, store and throw away your medicines at www.disposemymeds.org.          This list is accurate as of: 9/26/17  4:40 PM.  Always use your most recent med list.                   Brand Name Dispense Instructions for use Diagnosis    estradiol 2 MG tablet    ESTRACE    30 tablet    Take 1 tablet (2 mg) by mouth daily    Gender dysphoria in adult       fluticasone 50 MCG/ACT spray    FLONASE          lisinopril 10 MG tablet    PRINIVIL/ZESTRIL    30 tablet    TAKE 1 TABLET BY MOUTH DAILY    Benign essential hypertension       omeprazole 20 MG CR capsule    priLOSEC    28 capsule    Take 1 capsule (20 mg) by mouth 2 times daily    H. pylori infection       polyethylene glycol powder    MIRALAX    510 g    Take 17 g (1 capful) by mouth 2 times daily as needed for constipation    Constipation, unspecified constipation type       Simethicone 125 MG Caps     120 capsule    Take 1 capsule by mouth 4 times daily as needed    Flatulence, eructation and gas pain       spironolactone 100 MG tablet    ALDACTONE     Take 100 mg by mouth

## 2017-09-26 NOTE — PROGRESS NOTES
Preceptor Attestation:   Patient seen and discussed with the resident. Assessment and plan reviewed with resident and agreed upon.   Supervising Physician:  Gisele Jordan MD  [facility]

## 2017-10-03 PROBLEM — B96.81 DUODENAL ULCER DUE TO HELICOBACTER PYLORI: Status: ACTIVE | Noted: 2017-10-03

## 2017-10-03 PROBLEM — K26.9 DUODENAL ULCER DUE TO HELICOBACTER PYLORI: Status: ACTIVE | Noted: 2017-10-03

## 2017-10-03 PROBLEM — I10 BENIGN ESSENTIAL HYPERTENSION: Status: ACTIVE | Noted: 2017-10-03

## 2017-10-03 RX ORDER — LISINOPRIL 20 MG/1
20 TABLET ORAL DAILY
Qty: 30 TABLET | Refills: 1 | Status: SHIPPED | OUTPATIENT
Start: 2017-10-03 | End: 2017-12-19

## 2017-10-03 ASSESSMENT — ENCOUNTER SYMPTOMS
VOMITING: 0
NAUSEA: 0
COUGH: 0
CHILLS: 0
ABDOMINAL PAIN: 0
FATIGUE: 0
LIGHT-HEADEDNESS: 0
MYALGIAS: 0
CONFUSION: 0
FEVER: 0
HEMATURIA: 0
ARTHRALGIAS: 0
DIZZINESS: 0
SHORTNESS OF BREATH: 0
WOUND: 0
DYSURIA: 0
HEADACHES: 0
DIARRHEA: 0
WEAKNESS: 0
AGITATION: 0

## 2017-10-03 NOTE — PROGRESS NOTES
HPI:       Gordon Stack is a 37 year old who presents for the following  Patient presents with:  Hypertension: follow-up BP    Here to establish care with new PCP.  He goes to [facility] for hormonal therapy (MTF transition), on 2mg estradiol, was instructed to come to clinic for hypertension management before they would increase dosage to 4mg.  Patient reports was told needed blood pressure below 120/80 for increase in estradiol.    Hypertension Follow-up      Outpatient blood pressures are being checked at home.  Results are 120s-130s/80s patient reports.    Chest Pain? :No     Low Salt Diet: not monitoring salt but generally low salt diet (has improved diet tremendously and increased exercise regimen)    Daily NSAID Use?No     Did patient take their HTN pills today/last night as usual?  Yes     As per chart review, had elevated blood pressures (160/100s) as of 6 months ago, but most recent values have been 130s-140s.  Currently on lisinopril 10mg daily.  No diabetes, no known heart disease, reports no longer smoking, has lost weight, increased exercise, eating healthier.      Adherence and Exercise  Medication side effects: no  How often is a medication missed? Never  Exercise:walking  and yoga/stretching 2-3 days/week for an average of 45-60 minutes     Problem, Medication and Allergy Lists were reviewed and are current.  Patient is an established patient of this clinic.         Review of Systems:   Review of Systems   Constitutional: Negative for chills, fatigue and fever.   HENT: Negative for hearing loss and tinnitus.    Eyes: Negative for visual disturbance.   Respiratory: Negative for cough and shortness of breath.    Cardiovascular: Negative for chest pain and leg swelling.   Gastrointestinal: Negative for abdominal pain, diarrhea, nausea and vomiting.   Genitourinary: Negative for dysuria and hematuria.   Musculoskeletal: Negative for arthralgias and myalgias.   Skin: Negative for rash  "and wound.   Neurological: Negative for dizziness, syncope, weakness, light-headedness and headaches.   Psychiatric/Behavioral: Negative for agitation and confusion.   All other systems reviewed and are negative.            Physical Exam:   No data found.  /83 (BP Location: Left arm, Patient Position: Chair, Cuff Size: Adult Regular)  Pulse 89  Temp 98.1  F (36.7  C) (Oral)  Ht 6' 1.75\" (187.3 cm)  Wt 204 lb 12.8 oz (92.9 kg)  SpO2 98%  BMI 26.47 kg/m2    Body mass index is 26.47 kg/(m^2).  Vitals were reviewed and were normal today, blood pressure 130s/80s     Physical Exam   Constitutional: He is oriented to person, place, and time. He appears well-developed and well-nourished. No distress.   HENT:   Head: Normocephalic and atraumatic.   Eyes: EOM are normal. Right eye exhibits no discharge. Left eye exhibits no discharge.   Neck: Normal range of motion. Neck supple.   Cardiovascular: Normal rate and regular rhythm.    No murmur heard.  Pulmonary/Chest: Effort normal. No respiratory distress. He has no wheezes.   Abdominal: Soft. There is no tenderness.   Musculoskeletal: Normal range of motion. He exhibits no edema.   Neurological: He is alert and oriented to person, place, and time. Coordination normal.   Skin: Skin is warm. No rash noted. He is not diaphoretic. No erythema.   Psychiatric: He has a normal mood and affect. His behavior is normal. Thought content normal.       Results:     Results from last visit:  Orders Only on 04/11/2017   Component Date Value Ref Range Status     Specimen Description 04/12/2017 Feces   Final     H Pylori Antigen 04/12/2017 *  Final                    Value:Positive for Helicobacter pylori antigen by enzyme immunoassay. A positive   result indicates the presence of H. pylori antigen.       Micro Report Status 04/12/2017 FINAL 04/13/2017   Final     Assessment and Plan   Here is the plan from today's visit    1. Hypertension  JNC-8 criteria indicate blood pressure " management for 140/90 and target down to 140/90 unless cardiac disease (seperate recommendation by the ACC/AHA).  No known indication for tighter control for someone on hormone therapy.  Pressures today were 130s/80s, would not need medication adjustment automatically.  - As per our guidelines would not  unless symptomatic or other concerns  - Feel free to contact us if any other concerns, additional information, if any guidelines we should be aware of.    2. Duodenal ulcer due to Helicobacter pylori  - H Pylori antigen stool; Future  - For test of cure    3. Gender dysphoria in adult    Please call or return to clinic if your symptoms don't go away.    Follow up plan  Please make a clinic appointment for follow up with me (BRAD WRIGHT) in February 2018 or earlier as needed.    Medications Discontinued During This Encounter   Medication Reason     lisinopril (PRINIVIL/ZESTRIL) 10 MG tablet      Options for treatment and follow-up care were reviewed with the patient. Gordon Stack  engaged in the decision making process and verbalized understanding of the options discussed and agreed with the final plan.    Brad Wright MD    ADDENDUM October 3, 2017:  Contacted by Jennifer from [facility] regarding hypertension management, release signed by patient to discuss over the phone.  Concerned over elevated blood pressures, has been 150s/90s regularly at their clinic and they are not comfortable increasing estradiol with uncontrolled hypertension.  Discussed prior reasoning for not increasing medication therapy when values in our system and patient reported values had been 130s/80s.  - Given patient's persistently elevated values at their clinic despite improved diet, exercise, and lifestyle habits, given desire to increase estradiol and concerns over effect on blood pressure, will increase medication.  Initial concern for white coat hypertension but patient reported to their clinic  elevated values while at home as well  - Lisinopril increased to 20 mg daily  - target blood pressure <140/90  - Instructed to make follow up appointment and bring in blood pressure measurements (has home cuff)    Brad Wright MD, MPH  PGY-2 [facility]  Pager: [phone]

## 2017-12-19 DIAGNOSIS — I10 BENIGN ESSENTIAL HYPERTENSION: ICD-10-CM

## 2017-12-19 NOTE — TELEPHONE ENCOUNTER
Request for medication refill:    Date of last visit at clinic: 9/26/17    Please complete refill if appropriate and CLOSE ENCOUNTER.    Closing the encounter signifies the refill is complete.    If refill has been denied, please complete the smart phrase .smirefuse and route it to the [facility] RN TRIAGE pool to inform the patient and the pharmacy.    Antonia Loya

## 2017-12-20 ENCOUNTER — TELEPHONE (OUTPATIENT)
Dept: FAMILY MEDICINE | Facility: CLINIC | Age: 37
End: 2017-12-20

## 2017-12-20 DIAGNOSIS — I10 BENIGN ESSENTIAL HYPERTENSION: ICD-10-CM

## 2017-12-20 RX ORDER — LISINOPRIL 20 MG/1
20 TABLET ORAL DAILY
Qty: 30 TABLET | Refills: 1 | Status: SHIPPED | OUTPATIENT
Start: 2017-12-20 | End: 2017-12-20

## 2017-12-20 RX ORDER — LISINOPRIL 20 MG/1
20 TABLET ORAL DAILY
Qty: 30 TABLET | Refills: 1 | Status: SHIPPED | OUTPATIENT
Start: 2017-12-20 | End: 2018-02-22

## 2017-12-20 NOTE — TELEPHONE ENCOUNTER
[facility] phone call message- medication clarification/question:    Full Medication Name: lisinopril (PRINIVIL/ZESTRIL) 20 MG tablet       Question: Medication was sent to the wrong pharmacy and wants to know if we can send it to the correct pharmacy.      Pharmacy confirmed as [facility] [address], [place] [address] - [place], [place] - [address]: Yes    OK to leave a message on voice mail? Yes    Primary language: English      needed? No    Call taken on December 20, 2017 at 9:20 AM by Jennifer Muñiz

## 2017-12-20 NOTE — TELEPHONE ENCOUNTER
Medication resent to correct pharmacy per protocol. Cancelled script to [facility].    Marilyn Flower RN

## 2018-02-22 DIAGNOSIS — I10 BENIGN ESSENTIAL HYPERTENSION: ICD-10-CM

## 2018-02-22 RX ORDER — LISINOPRIL 20 MG/1
20 TABLET ORAL DAILY
Qty: 30 TABLET | Refills: 1 | Status: SHIPPED | OUTPATIENT
Start: 2018-02-22 | End: 2018-04-10

## 2018-02-22 NOTE — TELEPHONE ENCOUNTER
Request for medication refill:    Date of last visit at clinic: 09/26/2017    Please complete refill if appropriate and CLOSE ENCOUNTER.    Closing the encounter signifies the refill is complete.    If refill has been denied, please complete the smart phrase .smirefuse and route it to the [facility] RN TRIAGE pool to inform the patient and the pharmacy.    Kirill Werner, CMA

## 2018-04-10 DIAGNOSIS — I10 BENIGN ESSENTIAL HYPERTENSION: ICD-10-CM

## 2018-04-10 RX ORDER — LISINOPRIL 20 MG/1
20 TABLET ORAL DAILY
Qty: 30 TABLET | Refills: 1 | Status: SHIPPED | OUTPATIENT
Start: 2018-04-10 | End: 2018-07-13

## 2018-07-13 DIAGNOSIS — I10 BENIGN ESSENTIAL HYPERTENSION: ICD-10-CM

## 2018-07-14 RX ORDER — LISINOPRIL 20 MG/1
20 TABLET ORAL DAILY
Qty: 30 TABLET | Refills: 3 | Status: SHIPPED | OUTPATIENT
Start: 2018-07-14 | End: 2018-10-18

## 2018-11-20 ENCOUNTER — OFFICE VISIT (OUTPATIENT)
Dept: FAMILY MEDICINE | Facility: CLINIC | Age: 38
End: 2018-11-20
Payer: COMMERCIAL

## 2018-11-20 VITALS
TEMPERATURE: 97.9 F | SYSTOLIC BLOOD PRESSURE: 151 MMHG | RESPIRATION RATE: 16 BRPM | OXYGEN SATURATION: 96 % | DIASTOLIC BLOOD PRESSURE: 81 MMHG | BODY MASS INDEX: 28.18 KG/M2 | HEART RATE: 90 BPM | WEIGHT: 218 LBS

## 2018-11-20 DIAGNOSIS — Z00.00 HEALTHCARE MAINTENANCE: ICD-10-CM

## 2018-11-20 DIAGNOSIS — B96.81 DUODENAL ULCER DUE TO HELICOBACTER PYLORI: ICD-10-CM

## 2018-11-20 DIAGNOSIS — J30.2 SEASONAL ALLERGIC RHINITIS, UNSPECIFIED TRIGGER: ICD-10-CM

## 2018-11-20 DIAGNOSIS — I10 ESSENTIAL HYPERTENSION: Primary | ICD-10-CM

## 2018-11-20 DIAGNOSIS — K26.9 DUODENAL ULCER DUE TO HELICOBACTER PYLORI: ICD-10-CM

## 2018-11-20 LAB
BUN SERPL-MCNC: 12.4 MG/DL (ref 7–21)
CALCIUM SERPL-MCNC: 10.2 MG/DL (ref 8.5–10.1)
CHLORIDE SERPLBLD-SCNC: 99.5 MMOL/L (ref 98–110)
CHOLEST SERPL-MCNC: 240.2 MG/DL (ref 0–200)
CHOLEST/HDLC SERPL: 3.9 {RATIO} (ref 0–5)
CO2 SERPL-SCNC: 27.5 MMOL/L (ref 20–32)
CREAT SERPL-MCNC: 0.9 MG/DL (ref 0.7–1.3)
GFR SERPL CREATININE-BSD FRML MDRD: >90 ML/MIN/1.7 M2
GLUCOSE SERPL-MCNC: 106 MG'DL (ref 70–99)
HDLC SERPL-MCNC: 62.3 MG/DL
LDLC SERPL CALC-MCNC: 148 MG/DL (ref 0–129)
POTASSIUM SERPL-SCNC: 3.8 MMOL/DL (ref 3.3–4.5)
SODIUM SERPL-SCNC: 135.4 MMOL/L (ref 132.6–141.4)
TRIGL SERPL-MCNC: 151.2 MG/DL (ref 0–150)
VLDL CHOLESTEROL: 30.2 MG/DL (ref 7–32)

## 2018-11-20 RX ORDER — FLUTICASONE PROPIONATE 50 MCG
1 SPRAY, SUSPENSION (ML) NASAL DAILY
Qty: 1 BOTTLE | Refills: 3 | Status: SHIPPED | OUTPATIENT
Start: 2018-11-20 | End: 2020-11-20

## 2018-11-20 NOTE — MR AVS SNAPSHOT
After Visit Summary   11/20/2018    Gordon Stack    MRN: [id number]           Patient Information     Date Of Birth          1980        Visit Information        Provider Department      11/20/2018 4:00 PM Brad Wright MD [facility]        Today's Diagnoses     Essential hypertension    -  1    Healthcare maintenance        Seasonal allergic rhinitis, unspecified trigger          Care Instructions    Here is the plan from today's visit    1. Essential hypertension  - Lipid [place] ([facility] [place])  - Basic Metabolic Panel ([facility])    2. Healthcare maintenance  - HIV Antigen Antibody Combo    3. Seasonal allergic rhinitis, unspecified trigger  - fluticasone (FLONASE) 50 MCG/ACT spray; Spray 1 spray into both nostrils daily  Dispense: 1 Bottle; Refill: 3    Please call or return to clinic if your symptoms don't go away.    Follow up plan  Please make a clinic appointment for follow up with me (BRAD WRIGHT) for further blood pressure management as needed.    Thank you for coming to [facility] today.  Lab Testing:  **If you had lab testing today and your results are reassuring or normal they will be mailed to you or sent through [employer] within 7 days.   **If the lab tests need quick action we will call you with the results.  The phone number we will call with results is # [phone] (home) none (work). If this is not the best number please call our clinic and change the number.  Medication Refills:  If you need any refills please call your pharmacy and they will contact us.   If you need to  your refill at a new pharmacy, please contact the new pharmacy directly. The new pharmacy will help you get your medications transferred faster.   Scheduling:  If you have any concerns about today's visit or wish to schedule another appointment please call our office during normal business hours [phone] (8-5:00 M-F)  If a referral was made to a [facility]  [place] [facility] and you don't get a call from central scheduling please call [phone].  If a Mammogram was ordered for you at [facility] call [phone] to schedule or change your appointment.  If you had an XRay/CT/Ultrasound/MRI ordered the number is [phone] to schedule or change your radiology appointment.   Medical Concerns:  If you have urgent medical concerns please call [phone] at any time of the day.    Brad Wright MD            Follow-ups after your visit        Who to contact     Please call your clinic at [phone] to:    Ask questions about your health    Make or cancel appointments    Discuss your medicines    Learn about your test results    Speak to your doctor            Additional Information About Your Visit        [employer] Information     [employer] is an electronic gateway that provides easy, online access to your medical records. With [employer], you can request a clinic appointment, read your test results, renew a prescription or communicate with your care team.     To sign up for [employer] visit the website at [email or web address]   You will be asked to enter the access code listed below, as well as some personal information. Please follow the directions to create your username and password.     Your access code is: [email or web address]  Expires: 2019  4:47 PM     Your access code will  in 90 days. If you need help or a new code, please contact your [facility] or call [phone] for assistance.        Care EveryWhere ID     This is your Care EveryWhere ID. This could be used by other organizations to access your [place] medical records  [id number]        Your Vitals Were     Pulse Temperature Respirations Pulse Oximetry BMI (Body Mass Index)       90 97.9  F (36.6  C) (Oral) 16 96% 28.18 kg/m2        Blood Pressure from Last 3 Encounters:   18 151/81   17 134/83   17 (!) 158/111    Weight from Last 3  Encounters:   11/20/18 218 lb (98.9 kg)   09/26/17 204 lb 12.8 oz (92.9 kg)   04/20/17 199 lb 3.2 oz (90.4 kg)              We Performed the Following     Basic Metabolic Panel ([facility])     HIV Antigen Antibody Combo     Lipid Madeline ([facility])          Today's Medication Changes          These changes are accurate as of 11/20/18  4:47 PM.  If you have any questions, ask your nurse or doctor.               These medicines have changed or have updated prescriptions.        Dose/Directions    fluticasone 50 MCG/ACT spray   Commonly known as:  FLONASE   This may have changed:    - how much to take  - how to take this  - when to take this   Used for:  Seasonal allergic rhinitis, unspecified trigger   Changed by:  Brad Wright MD        Dose:  1 spray   Spray 1 spray into both nostrils daily   Quantity:  1 Bottle   Refills:  3            Where to get your medicines      These medications were sent to [facility] [place], [place] - [address] [address]     Phone:  [phone]     fluticasone 50 MCG/ACT spray                Primary Care Provider Office Phone # Fax #    Brad Wright [phone]       [facility] [address] [facility] [address]  [facility] [address]        Equal Access to Services     RONEY VALLADARES AH: Bibiana sheehan hadasho Soomaali, waaxda luqadaha, qaybta kaalmada adeegyada, waxay alta pires. So [facility] [phone].    ATENCIÓN: Si habla español, tiene a adam disposición servicios gratuitos de asistencia lingüística. Llame al [phone].    We comply with applicable federal civil rights laws and [place] laws. We do not discriminate on the basis of race, color, national origin, age, disability, sex, sexual orientation, or gender identity.            Thank you!     Thank you for choosing [facility]  for your care. Our goal is always to provide you with excellent care. Hearing back  from our patients is one way we can continue to improve our services. Please take a few minutes to complete the written survey that you may receive in the mail after your visit with us. Thank you!             Your Updated Medication List - Protect others around you: Learn how to safely use, store and throw away your medicines at www.disposemymeds.org.          This list is accurate as of 11/20/18  4:47 PM.  Always use your most recent med list.                   Brand Name Dispense Instructions for use Diagnosis    estradiol 2 MG tablet    ESTRACE    30 tablet    Take 1 tablet (2 mg) by mouth daily    Gender dysphoria in adult       fluticasone 50 MCG/ACT spray    FLONASE    1 Bottle    Spray 1 spray into both nostrils daily    Seasonal allergic rhinitis, unspecified trigger       lisinopril 20 MG tablet    PRINIVIL/ZESTRIL    30 tablet    Take 1 tablet (20 mg) by mouth daily    Benign essential hypertension       polyethylene glycol powder    MIRALAX    510 g    Take 17 g (1 capful) by mouth 2 times daily as needed for constipation    Constipation, unspecified constipation type       Simethicone 125 MG Caps     120 capsule    Take 1 capsule by mouth 4 times daily as needed    Flatulence, eructation and gas pain       spironolactone 100 MG tablet    ALDACTONE     Take 100 mg by mouth

## 2018-11-20 NOTE — LETTER
November 23, 2018      Gordon Stack  [address]  [place] [facility] [place] [facility] [address]        Dear Gordon,    Thank you for getting your care at [facility]. Please see below for your test results.  Cholesterol mildly elevated, would work on diet and exercise but no need for medications at this time.  All other tests normal ranges  Negative testing for HIV meaning no signs of HIV detected on this test (though there is a 3-6 month window where you could have been exposed and it would not show up immediately, if you had any high risk contacts and are concerned, can be retested in 3-6 months.  Your choice, not required, only indicated if you personally wish to have done).    Resulted Orders   Lipid [place]ascade (\[facility] [place] [facility]\"")   Result Value Ref Range    Cholesterol 240.2 (H) 0.0 - 200.0 mg/dL    Cholesterol/HDL Ratio 3.9 0.0 - 5.0    HDL Cholesterol 62.3 >40.0 mg/dL    LDL Cholesterol Calculated 148 (H) 0 - 129 mg/dL    Triglycerides 151.2 (H) 0.0 - 150.0 mg/dL    VLDL Cholesterol 30.2 7.0 - 32.0 mg/dL   Basic Metabolic Panel (\[facility] [place] [facility]\"")   Result Value Ref Range    Urea Nitrogen 12.4 7.0 - 21.0 mg/dL    Calcium 10.2 (H) 8.5 - 10.1 mg/dL    Chloride 99.5 98.0 - 110.0 mmol/L    Carbon Dioxide 27.5 20.0 - 32.0 mmol/L    Creatinine 0.9 0.7 - 1.3 mg/dL    Glucose 106.0 (H) 70.0 - 99.0 mg'dL    Potassium 3.8 3.3 - 4.5 mmol/dL    Sodium 135.4 132.6 - 141.4 mmol/L    GFR Estimate >90 >60.0 mL/min/1.7 m2    GFR Estimate If Black >90 >60.0 mL/min/1.7 m2   HIV Antigen Antibody Combo   Result Value Ref Range    HIV Antigen Antibody Combo Nonreactive NR^Nonreactive          Comment:      HIV-1 p24 Ag & HIV-1/HIV-2 Ab Not Detected       If you have any concerns about these results please call and leave a message for me or send a [employer] message to the clinic.    Sincerely,    Brad Wright MD

## 2018-11-20 NOTE — PROGRESS NOTES
HPI       Gordon Stack is a 38 year old  who presents for   Chief Complaint   Patient presents with     RECHECK     BP and refill on Flonase     Hypertension Follow-up  <130/80  Goes to [facility] for HRT, here for discussion of blood pressure management.  Currently takes lisinopril 20 mg daily (also on orlando 100 mg for HRT)  No history of hyperkalemia    Outpatient blood pressures are being checked at home.  Results are 124/83.    Chest Pain? : no    Low Salt Diet: not monitoring salt    Daily NSAID Use?No     Did patient take their HTN pills today/last night as usual?  They take lisinopril intermittently, about 5 days a week but then becomes dizzy at times and will stop for a day or two.    Last Basic Metabolic Panel:  Lab Results   Component Value Date    .2 02/02/2017      Lab Results   Component Value Date    POTASSIUM 4.2 02/02/2017     Lab Results   Component Value Date    CHLORIDE 101.0 02/02/2017     Lab Results   Component Value Date    HEATHER 10.0 02/02/2017     Lab Results   Component Value Date    CO2 31.7 02/02/2017     Lab Results   Component Value Date    BUN 8.2 02/02/2017     Lab Results   Component Value Date    CR 0.9 02/02/2017     Lab Results   Component Value Date    .9 02/02/2017       Adherence and Exercise  Medication side effects: dizziness  How often is a medication missed? About 1-3 times per week  Exercise:No exercise       +++++++    Problem, Medication and Allergy Lists were reviewed and updated if needed..    Patient is an established patient of this clinic..         Review of Systems:   Review of Systems   Constitutional: Negative for chills and fever.   HENT: Negative for congestion and sore throat.    Eyes: Negative for visual disturbance.   Respiratory: Negative for cough and shortness of breath.    Cardiovascular: Negative for chest pain and leg swelling.   Gastrointestinal: Negative for abdominal pain, diarrhea, nausea and vomiting.   Genitourinary:  Negative for dysuria.   Musculoskeletal: Negative for joint swelling and myalgias.   Skin: Negative for rash.   Neurological: Negative for dizziness (occasional after taking lisinopril for 3+ days, will stop for 1-2 days and will resolve ) and light-headedness.   Psychiatric/Behavioral: Negative for confusion.            Physical Exam:     Vitals:    11/20/18 1630 11/20/18 1632   BP: 151/83 151/81   BP Location: Left arm Left arm   Patient Position: Sitting Sitting   Cuff Size: Adult Regular Adult Regular   Pulse: 90    Resp: 16    Temp: 97.9  F (36.6  C)    TempSrc: Oral    SpO2: 96%    Weight: 218 lb (98.9 kg)      Body mass index is 28.18 kg/(m^2).  Vital signs normal except for elevated blood pressure     Physical Exam   Constitutional: He is oriented to person, place, and time. He appears well-developed and well-nourished. No distress.   HENT:   Head: Normocephalic and atraumatic.   Eyes: EOM are normal. Pupils are equal, round, and reactive to light. Right eye exhibits no discharge. Left eye exhibits no discharge.   Neck: Normal range of motion. Neck supple.   Cardiovascular: Normal rate and regular rhythm.    No murmur heard.  Pulmonary/Chest: Effort normal and breath sounds normal. No respiratory distress. He has no wheezes. He has no rales.   Musculoskeletal: He exhibits no edema.   Neurological: He is alert and oriented to person, place, and time. He exhibits normal muscle tone.   Skin: Skin is warm and dry. He is not diaphoretic.   Psychiatric: He has a normal mood and affect. His behavior is normal.       Results:   Results from last visit:  Orders Only on 04/11/2017   Component Date Value Ref Range Status     Specimen Description 04/12/2017 Feces   Final     H Pylori Antigen 04/12/2017 *  Final                    Value:Positive for Helicobacter pylori antigen by enzyme immunoassay. A positive   result indicates the presence of H. pylori antigen.       Micro Report Status 04/12/2017 FINAL 04/13/2017    Final       Assessment and Plan        1. Essential hypertension  Due for lipid panel today, will also repeat BMP to assess kidney function and ensure no hyperkalemia as on lisinopril (for blood pressure) and spironolactone (for HRT).  Has his medical care managed at [facility] for his HRT, but occasionally they refer him to [facility] for other medical concerns.  He reports some possible light-headed/dizziness, he correlates it with his lisinopril usage, he will take for a few days and then feel dizzy he thinks, will stop for a day or two, symptoms will resolve, then he will restart.  Negative orthostatic testing today.  As he he has elevated pressures in clinic today and on previous visits (158/111 in 2017), he likely does need some blood pressure control.  His reported values of 120s/80s at home would indicate current therapy adequate.  No symptoms today in clinic, was okay with continuing the lisinopril, recommended taking regularly for a week, checking pressures at home when he feels dizzy, and calling if has any low blood pressures.  - Lipid [place] ([facility])  - Basic Metabolic Panel ([facility])    2. Healthcare maintenance  Discussed recommended screening, wished for HIV testing today.  - HIV Antigen Antibody Combo    3. Seasonal allergic rhinitis, unspecified trigger  refill  - fluticasone (FLONASE) 50 MCG/ACT spray; Spray 1 spray into both nostrils daily  Dispense: 1 Bottle; Refill: 3    4. Treated H pylori with ulcer  Treated in past, never had test of cure, will recheck today.  Reports symptoms resolved largely after treatment so likely negative.  - H Pylori antigen stool; Future       There are no discontinued medications.    Options for treatment and follow-up care were reviewed with the patient. Gordon Stack  engaged in the decision making process and verbalized understanding of the options discussed and agreed with the final plan.    Brad Wright MD

## 2018-11-20 NOTE — PATIENT INSTRUCTIONS
Here is the plan from today's visit    1. Essential hypertension  - Lipid [place] ([facility])  - Basic Metabolic Panel ([facility])    2. Healthcare maintenance  - HIV Antigen Antibody Combo    3. Seasonal allergic rhinitis, unspecified trigger  - fluticasone (FLONASE) 50 MCG/ACT spray; Spray 1 spray into both nostrils daily  Dispense: 1 Bottle; Refill: 3    Please call or return to clinic if your symptoms don't go away.    Follow up plan  Please make a clinic appointment for follow up with me (BRAD AUSTIN) for further blood pressure management as needed.    Thank you for coming to [facility] today.  Lab Testing:  **If you had lab testing today and your results are reassuring or normal they will be mailed to you or sent through [facility] within 7 days.   **If the lab tests need quick action we will call you with the results.  The phone number we will call with results is # [phone] (home) none (work). If this is not the best number please call our clinic and change the number.  Medication Refills:  If you need any refills please call your pharmacy and they will contact us.   If you need to  your refill at a new pharmacy, please contact the new pharmacy directly. The new pharmacy will help you get your medications transferred faster.   Scheduling:  If you have any concerns about today's visit or wish to schedule another appointment please call our office during normal business hours [phone] (8-5:00 M-F)  If a referral was made to a [facility] and you don't get a call from central scheduling please call [phone].  If a Mammogram was ordered for you at [facility] call [phone] to schedule or change your appointment.  If you had an XRay/CT/Ultrasound/MRI ordered the number is [phone] to schedule or change your radiology appointment.   Medical Concerns:  If you have urgent medical concerns please call [phone] at any time of the day.    Brad REN  MD Kyle

## 2018-11-20 NOTE — PROGRESS NOTES
Preceptor Attestation:   Patient seen, evaluated and discussed with the resident. I have verified the content of the note, which accurately reflects my assessment of the patient and the plan of care.   Supervising Physician:  Gisele Jordan MD

## 2018-11-23 LAB — HIV 1+2 AB+HIV1 P24 AG SERPL QL IA: NONREACTIVE

## 2018-11-28 ASSESSMENT — ENCOUNTER SYMPTOMS
ABDOMINAL PAIN: 0
MYALGIAS: 0
SORE THROAT: 0
DYSURIA: 0
CHILLS: 0
COUGH: 0
DIZZINESS: 0
VOMITING: 0
FEVER: 0
DIARRHEA: 0
CONFUSION: 0
NAUSEA: 0
SHORTNESS OF BREATH: 0
LIGHT-HEADEDNESS: 0
JOINT SWELLING: 0

## 2019-10-03 ENCOUNTER — HEALTH MAINTENANCE LETTER (OUTPATIENT)
Age: 39
End: 2019-10-03

## 2019-11-19 ENCOUNTER — OFFICE VISIT (OUTPATIENT)
Dept: FAMILY MEDICINE | Facility: CLINIC | Age: 39
End: 2019-11-19
Payer: COMMERCIAL

## 2019-11-19 VITALS
HEART RATE: 110 BPM | BODY MASS INDEX: 30.13 KG/M2 | WEIGHT: 234.8 LBS | OXYGEN SATURATION: 97 % | HEIGHT: 74 IN | SYSTOLIC BLOOD PRESSURE: 114 MMHG | DIASTOLIC BLOOD PRESSURE: 81 MMHG | TEMPERATURE: 98.8 F | RESPIRATION RATE: 18 BRPM

## 2019-11-19 DIAGNOSIS — I10 BENIGN ESSENTIAL HYPERTENSION: Primary | ICD-10-CM

## 2019-11-19 DIAGNOSIS — Z23 NEED FOR PROPHYLACTIC VACCINATION AND INOCULATION AGAINST INFLUENZA: ICD-10-CM

## 2019-11-19 DIAGNOSIS — L98.9 SKIN LESION OF FACE: ICD-10-CM

## 2019-11-19 ASSESSMENT — ENCOUNTER SYMPTOMS
COLOR CHANGE: 1
CHILLS: 0
LIGHT-HEADEDNESS: 0
HEADACHES: 0
PALPITATIONS: 0
SHORTNESS OF BREATH: 0
FEVER: 0

## 2019-11-19 ASSESSMENT — PAIN SCALES - GENERAL: PAINLEVEL: NO PAIN (0)

## 2019-11-19 ASSESSMENT — MIFFLIN-ST. JEOR: SCORE: 2049.8

## 2019-11-19 NOTE — PROGRESS NOTES
"       HPI       Gordon Stack is a 39 year old nonbinary individual (they/them/theirs) who presents for     Hypertension Follow-up  <130/80  Had previously been taking lisinopril 20 mg daily (also on orlando 100 mg for HRT- managed by [facility]) until they ran out of the lisinopril ~2 weeks ago. Since that time, they have been checking their blood pressures more regularly at home and states BPs have been \"normal\".       Outpatient blood pressures are being checked at home x2 weeks since they ran out of their lisinopril.  Results are \"normal\"- 120s systolic, unsure of \"bottom number\".    Chest Pain?: No     Low Salt Diet: \"trying\" but not sticking to any particular diet    Daily NSAID Use? No     Did patient take their HTN pills today/last night as usual?  No    Last Basic Metabolic Panel:  Lab Results   Component Value Date    .4 11/20/2018      Lab Results   Component Value Date    POTASSIUM 3.8 11/20/2018     Lab Results   Component Value Date    CHLORIDE 99.5 11/20/2018     Lab Results   Component Value Date    HEATHER 10.2 11/20/2018     Lab Results   Component Value Date    CO2 27.5 11/20/2018     Lab Results   Component Value Date    BUN 12.4 11/20/2018     Lab Results   Component Value Date    CR 0.9 11/20/2018     Lab Results   Component Value Date    .0 11/20/2018       Adherence and Exercise  Medication side effects: no- last visit one year ago, had reported intermittent \"faintness\" resulting in not taking lisinopril 1-2 days/week, states they were no longer experiencing this side effect    How often is a medication missed? Never; has not taken lisinopril x2 weeks  Exercise:walking  1x in the past 1-2 months. States they \"know I need to but always find excuses\". They do have a gym in their apartment complex so access is not an issue. States they are hoping to \"do better\" at this.    +++++++  Moles  Onset: States they noticed a \"new\" mole on the left temple in the past 2-3 weeks. Also has " "one on the back/right side of his scalp which was first noticed by  several years ago and has not changed.    Description:   Color: brown, slightly lighter than their other moles  Character: round, raised  Itching (Pruritis): maybe?   Pain?: no    Progression of Symptoms:  Has not changed since they first noticed it a few weeks ago      Problem, Medication and Allergy Lists were reviewed and updated if needed.  Patient is an established patient of this clinic.         Review of Systems:   Review of Systems   Constitutional: Negative for chills and fever.   Eyes: Negative for visual disturbance.   Respiratory: Negative for shortness of breath.    Cardiovascular: Negative for chest pain and palpitations.   Skin: Positive for color change (mole).   Neurological: Negative for light-headedness and headaches.   All other systems reviewed and are negative.           Physical Exam:     Vitals:    11/19/19 1403 11/19/19 1404   BP: (!) 137/90 114/81   Pulse: 110 110   Resp: 18    Temp: 98.8  F (37.1  C)    TempSrc: Oral    SpO2: 97%    Weight: 106.5 kg (234 lb 12.8 oz)    Height: 1.88 m (6' 2\")      Body mass index is 30.15 kg/m .  Vitals were reviewed and were normal     Physical Exam  Constitutional:       General: He is not in acute distress.     Appearance: He is well-developed. He is not diaphoretic.   Cardiovascular:      Rate and Rhythm: Normal rate and regular rhythm.      Heart sounds: No murmur.   Pulmonary:      Effort: Pulmonary effort is normal. No respiratory distress.   Skin:     General: Skin is warm.      Findings: Lesion (Left temple in the hair line there is a 6mm x 7mm hyperpigmented brown papule. No bleeding or significant irritation. No skin flaking. No surrounding erythema.) present.      Comments: Flesh-colored 2mm x 2 mm papule on the right parietal scalp.   Neurological:      General: No focal deficit present.      Mental Status: He is alert.           Results:   No testing ordered " today    Lipid panel from 11/2018 reviewed. ASCVD risk calculated from those results = 1.8%    Assessment and Plan        1. Benign essential hypertension  Patient was history of hypertension presenting for follow up after running out of lisinopril 20 mg two weeks prior. Remains on spironolactone 100 mg for HRT (managed by [facility]). Blood pressure is nearly at goal today despite not taking lisinopril x2 weeks. ASCVD risk 1.8%. Diagnosed with hypertension in 2017 after elevated BP reading in clinic, states never had symptoms. Reviewing previous clinic notes, white coat hypertension was suspected as home readings had been within normal range, however blood pressures from [facility] had persistently been in the 150s/90s and they were uncomfortable increasing estradiol due to concerns about BP. Given nearly normal BP today (diastolic minimally elevated at 81 mmHg), recommend home monitoring with follow up in 2 weeks for reevaluation and consideration for restarting lisinopril. Patient reports understanding and agreement with this plan.  - Follow up in 2 weeks for BP follow up  - Keep a log of BP at home  - Will see if he qualifies for mGlide study   - Repeat lipid panel in 2 years (q3y)    2. Skin lesion of face  Clinical signs and symptoms consistent with benign nevus, however, given new onset, color slightly lighter than other moles and family history of skin cancer, would recommend biopsy for definitive diagnosis.   - Procedure [facility]    3. Healthcare maintenance  - Flu shot given today  - Discussed tetanus shot, patient states will return for this (did not want two shots in one day)       There are no discontinued medications.    Options for treatment and follow-up care were reviewed with the patient. Gordon Stack engaged in the decision making process and verbalized understanding of the options discussed and agreed with the final plan.    Julianne Hyatt,  MD

## 2019-12-04 ENCOUNTER — OFFICE VISIT (OUTPATIENT)
Dept: FAMILY MEDICINE | Facility: CLINIC | Age: 39
End: 2019-12-04
Attending: FAMILY MEDICINE
Payer: COMMERCIAL

## 2019-12-04 VITALS
RESPIRATION RATE: 16 BRPM | SYSTOLIC BLOOD PRESSURE: 139 MMHG | HEIGHT: 74 IN | BODY MASS INDEX: 30.47 KG/M2 | WEIGHT: 237.4 LBS | HEART RATE: 95 BPM | OXYGEN SATURATION: 96 % | DIASTOLIC BLOOD PRESSURE: 89 MMHG | TEMPERATURE: 98.3 F

## 2019-12-04 DIAGNOSIS — D22.4 ATYPICAL NEVUS OF SCALP: Primary | ICD-10-CM

## 2019-12-04 ASSESSMENT — MIFFLIN-ST. JEOR: SCORE: 2061.59

## 2019-12-04 NOTE — PROGRESS NOTES
[facility]  Skin Biopsy Procedure Note    Gordon Stack is a patient of Julianne Carter here for atypical nevus with rule out of potential skin malignancy     Consent: Affirmation of informed consent was signed and scanned into the medical record. Risks, benefits and alternatives were discussed. Patient's questions were elicited and answered.   Procedure safety checklist was completed:  Yes  Time Out (Pause for the Cause) completed: Yes    Preoperative Diagnosis: atypical nevus   Location: left anterior scalp   Size:  5 mm x 4 mm   Postoperative Diagnosis: same    Technique:   Skin prep Alcohol   Anesthesia 2 cc 1% lidocaine, with epi   Biopsy size 5mm x 4 mm  Biopsy taken via (shave, punch, incisional) shave  Suture  None  Hemostasis  Pressure and Dry Sol    EBL:    < 5 ml   Complications:  No  Tolerance:   Pt tolerated procedure well and was in stable condition.   Pathology sent Yes    Instructions:    Pt should keep dressing in place for 24 hours, then may change and apply antibiotic ointment and simple bandage. May shower after 24 hours.  Pt was instructed to call if bleeding, severe pain or foul smell.   Follow up pending pathology results.       Resident: Nevaeh Tello MD  Faculty: Javier Nicole MD present for and supervised this entire procedure.

## 2019-12-04 NOTE — LETTER
"December 22, 2019      Chaparro Hyman  [address]  [place] [address]        Dear Chaparro,    Thank you for getting your care at [facility]. Please see below for your test results.    Resulted Orders   Surgical pathology exam   Result Value Ref Range    Copath Report       Patient Name: CHAPARRO HYMAN  MR#: [id number]  Specimen #: [id number]  Collected: 12/4/2019  Received: 12/6/2019  Reported: 12/9/2019 07:44  Ordering Phy(s): CHUCK SMITH    For improved result formatting, select 'View Enhanced Report Format' under   Linked Documents section.    SPECIMEN(S):  Skin, left anterior scalp    FINAL DIAGNOSIS:  Skin, left anterior scalp:  - Seborrheic keratosis, pigmented.    Electronically signed out by:    Silvano Blandon M.D., PhD    CLINICAL HISTORY:  39 year old male.    GROSS:  A single specimen container with formalin is received labeled with the   patient's name, date of birth, and  medical record number. Information on the requisition slip, container, and   associated labels is confirmed.    The specimen is designated \"skin left anterior scalp\" consisting of a 0.6   x 0.3 x 0.2 cm brown granular skin  lesion.  The resection margin is inked black.  Trisected.  Entirely   submitted in one cassette. (Dictated by:  Rosalia Coe 12/6/2019 10:36 AM)    JONH ROSCOPIC:  Microscopic examination is performed.    The technical component of this testing was completed at the [facility], with the professional component performed   at the [facility], [address] [place] [phone] ([phone])    CPT Codes:  A: 99878-GU2    COLLECTION SITE:  Client: [facility]  Location: [facility] ([place])       Luis.  I believe that we have been trying to call you.  Your test came back with a Seborrheic Keratosis which is a benign (non-cancerous) lesion.  These " can sometimes grow back but usually will not.    If you have any concerns about these results please call and leave a message for me or send a [employer] message to the clinic.    Sincerely,    Javier Nicole MD

## 2019-12-04 NOTE — PATIENT INSTRUCTIONS
Skin Biopsy  What is a skin biopsy?   A skin biopsy is the removal of a small piece of skin for lab tests. It may be done to help diagnose a problem with the skin.   When is it used?   A skin biopsy will help your healthcare provider make a diagnosis of your problem. For example:   You may have an internal disease that a skin biopsy may explain.   You may have a skin disease or cancer.   Your skin may have become discolored.   Your skin may be inflamed.   Alternatives to this procedure include:   to proceed with treatment without a diagnosis   to choose not to have treatment, recognizing the risks of your condition   to take a watchful approach and reevaluate the lesion or disorder at a future time.   When should I call my healthcare provider?   Call your provider right away if:   You have bleeding that cannot be stopped by putting pressure on the wound.   Your wound becomes red or has pus or you develop a fever (signs of infection).   You have significant pain that is not controlled with ibuprofen or tylenol.     Wound Care  1. Keep it covered for the first 2-3 days with a band aid, or if it is a large wound or is draining a lot, a small piece of gauze with tape.  2. Apply a thin film of vaseline over the area to keep it mildly moist and prevent scab formation.   3. Change your bandaid daily.  4. As you heal, the new skin will be very sensitive to sun - please protect your healing wound by covering up and using sunscreen.   5. Results will be mailed or called to you. It can take up to 10 days to get biopsy results back. If you do not hear from us, please call us at [phone] and let us know that you haven't received your results.

## 2019-12-06 NOTE — PROGRESS NOTES
Preceptor Attestation:  I was present for and supervised the entire procedure.   Supervising Physician:  Javier Nicole MD.

## 2019-12-09 LAB — COPATH REPORT: NORMAL

## 2020-09-18 NOTE — PROGRESS NOTES
Gordon is a 40 year old who presents for   Patient presents with:  Follow Up: F/U BP   Imm/Inj: Tdap   Imm/Inj: Flu Shot    Assessment and Plan      1. Essential hypertension, chronic, worsening  Patient with consistently elevated blood pressure in clinic and borderline hypertensive values at home.  They did not have his home blood pressure machine with them today to calibrate.  Discussed that given ongoing borderline and elevated blood pressures for the past 3 years, it is recommended to start an antihypertensive at this point, which they are in agreement.  Patient complained of dizziness when on lisinopril 20 mg daily in the past.  Discussed restarting lisinopril at a lower dose (5 mg) and monitoring for symptoms.  I also discussed alternative options including a calcium channel blocker, ACE inhibitor or thiazide diuretic.  Patient is reluctant to start a diuretic.  Also discussed option of increasing spironolactone that they are already on, however, patient states blood pressures were elevated when on a higher dose of spironolactone in the past and would prefer not to try that option. Labs were obtained today to assess thyroid function, lipids, diabetes, and kidney function.  Pending blood pressure control and/or symptoms, would try CCB next.  Will calculate ASCVD risk using updated labs to determine if he would benefit from statin and/or aspirin. follow up in 4 weeks.  - Lipid [place] ([facility])  - Basic Metabolic Panel ([facility])  - Albumin Random Urine Quantitative with Creat Ratio  - Hemoglobin A1c ([facility])  - TSH with free T4 reflex  - lisinopril (ZESTRIL) 5 MG tablet; Take 1 tablet (5 mg) by mouth daily  Dispense: 30 tablet; Refill: 0    2. Need for prophylactic vaccination and inoculation against influenza  - INFLUENZA VACCINE IM > 6 MONTHS VALENT IIV4 [23117]    3. Gender dysphoria  Patient also interested in transitioning HRT care to [facility] as this location is closer.  Next appointment with me can be  scheduled as HRT and BP follow-up.       Return in about 4 weeks (around 10/27/2020) for HRT + BP follow up.    There are no discontinued medications.      Julianne Hyatt MD         FAZAL Leyva (she/her) is a 40 year old who presents for   Patient presents with:  Follow Up: F/U BP   Imm/Inj: Tdap   Imm/Inj: Flu Shot    Visit [place]  1. Blood pressure follow up  Diagnosed with hypertension in 2017 after elevated BP reading in clinic, never had symptoms. White coat hypertension was suspected as home readings had been within normal range, though blood pressures from [facility] had persistently been in the 150s/90s and they were uncomfortable increasing estradiol due to concerns about BP. Given nearly normal BP in clinic 11/2019 (diastolic minimally elevated at 81 mmHg) off lisinopril 20 mg x2 weeks, recommended home monitoring with follow up in 2 weeks for reevaluation and consideration for restarting lisinopril.     Since that time, patient has been checking home BP. She states it's higher just after exercising and just before coming to clinic. No chest pain, palpitations, leg pain or swelling, cough, SOB, or headaches. She had stopped lisinopril 20 mg because of occasional dizziness with standing up from sitting. No other side effects from lisinopril. Currently on estradiol 2 mg injection 1x/week and spironolactone 100 mg/day. Had previously been on spironolactone 200 mg/day and still had elevated BP then. No family history of CVD, diabetes, or kidney disease.     Date BP   5/9/20 134/85   9/28/20 119/83   9/29/20  137/88   9/29/20  125/87           ASCVD 10 year RISK:  Calculated 09/29/20   The 10-year ASCVD risk score (Braxton ALFREDO Jr., et al., 2013) is: 8.2%    Values used to calculate the score:      Age: 40 years      Sex: Male      Is Non- : No      Diabetic: No      Tobacco smoker: Yes      Systolic Blood Pressure: 157 mmHg      Is BP treated: Yes      HDL Cholesterol:  49 mg/dL      Total Cholesterol: 227.1 mg/dL       Problem, Medication and Allergy Lists were reviewed and updated if needed.  Patient is an established patient of this clinic.  Reviewed and updated as needed this visit by clinical staff  Tobacco  Allergies  Meds  Problems  Med Hx  Surg Hx  Fam Hx       Reviewed and updated as needed this visit by Provider  Tobacco  Allergies  Meds  Problems  Med Hx  Surg Hx  Fam Hx       Health Maintenance Due   Topic Date Due     PREVENTIVE CARE VISIT  1980     PNEUMOCOCCAL IMMUNIZATION 19-64 MEDIUM RISK (1 of 1 - PPSV23) 07/18/1999          Review of Systems:   10 point review of symptoms was otherwise negative except as noted in HPI         Physical Exam:     Vitals:    09/29/20 0914 09/29/20 0918   BP: (!) 160/84 (!) 157/90   BP Location: Left arm Left arm   Patient Position: Sitting Sitting   Cuff Size: Adult Large Adult Large   Pulse: 106    SpO2: 98%    Weight: 104.8 kg (231 lb)      Body mass index is 29.66 kg/m .  Vital signs normal except for elevated BP.      Physical Exam  Constitutional:       General: She is not in acute distress.     Appearance: Normal appearance.   Cardiovascular:      Rate and Rhythm: Regular rhythm. Tachycardia present.      Heart sounds: Normal heart sounds. No murmur.   Pulmonary:      Effort: Pulmonary effort is normal. No accessory muscle usage or respiratory distress.      Breath sounds: Normal breath sounds. No wheezing.   Musculoskeletal:      Right lower leg: No edema.      Left lower leg: No edema.   Skin:     General: Skin is warm.   Neurological:      General: No focal deficit present.      Mental Status: She is alert.   Psychiatric:         Mood and Affect: Mood normal.         Speech: Speech normal.         Behavior: Behavior normal. Behavior is cooperative.         Judgment: Judgment normal.         Results:   Results are ordered and pending    Options for treatment and follow-up care were reviewed with the  patient. Gordon Stack engaged in the decision making process and verbalized understanding of the options discussed and agreed with the final plan.  I was present with the medical student for the service. I personally verified the history of present illness and performed the physical examination and medical decision making. I have verified all of the medical student s documentation for this encounter.  [facility]      Yoko Rain MS3

## 2020-09-29 ENCOUNTER — OFFICE VISIT (OUTPATIENT)
Dept: FAMILY MEDICINE | Facility: CLINIC | Age: 40
End: 2020-09-29
Payer: COMMERCIAL

## 2020-09-29 VITALS
WEIGHT: 231 LBS | BODY MASS INDEX: 29.66 KG/M2 | DIASTOLIC BLOOD PRESSURE: 90 MMHG | SYSTOLIC BLOOD PRESSURE: 157 MMHG | HEART RATE: 106 BPM | OXYGEN SATURATION: 98 %

## 2020-09-29 DIAGNOSIS — I10 ESSENTIAL HYPERTENSION: Primary | ICD-10-CM

## 2020-09-29 DIAGNOSIS — Z23 NEED FOR PROPHYLACTIC VACCINATION AND INOCULATION AGAINST INFLUENZA: ICD-10-CM

## 2020-09-29 LAB
BUN SERPL-MCNC: 7.6 MG/DL (ref 7–21)
CALCIUM SERPL-MCNC: 9.7 MG/DL (ref 8.5–10.1)
CHLORIDE SERPLBLD-SCNC: 100.3 MMOL/L (ref 98–110)
CHOLEST SERPL-MCNC: 227.1 MG/DL (ref 0–200)
CHOLEST/HDLC SERPL: 4.6 {RATIO} (ref 0–5)
CO2 SERPL-SCNC: 27 MMOL/L (ref 20–32)
CREAT SERPL-MCNC: 0.8 MG/DL (ref 0.7–1.3)
CREAT UR-MCNC: 44 MG/DL
GFR SERPL CREATININE-BSD FRML MDRD: >90 ML/MIN/1.7 M2
GLUCOSE SERPL-MCNC: 116.8 MG'DL (ref 70–99)
HBA1C MFR BLD: 5.5 % (ref 4.1–5.7)
HDLC SERPL-MCNC: 49 MG/DL
LDLC SERPL CALC-MCNC: 163 MG/DL (ref 0–129)
MICROALBUMIN UR-MCNC: <5 MG/L
MICROALBUMIN/CREAT UR: NORMAL MG/G CR (ref 0–17)
POTASSIUM SERPL-SCNC: 3.8 MMOL/L (ref 3.3–4.5)
SODIUM SERPL-SCNC: 136.3 MMOL/L (ref 132.6–141.4)
TRIGL SERPL-MCNC: 73.9 MG/DL (ref 0–150)
TSH SERPL DL<=0.005 MIU/L-ACNC: 0.78 MU/L (ref 0.4–4)
VLDL CHOLESTEROL: 14.8 MG/DL (ref 7–32)

## 2020-09-29 RX ORDER — LISINOPRIL 5 MG/1
5 TABLET ORAL DAILY
Qty: 30 TABLET | Refills: 0 | Status: SHIPPED | OUTPATIENT
Start: 2020-09-29 | End: 2020-10-20

## 2020-09-29 NOTE — PATIENT INSTRUCTIONS
Here is the plan from today's visit    1. Essential hypertension  - Lipid [place] ([facility])  - Basic Metabolic Panel ([facility])  - Albumin Random Urine Quantitative with Creat Ratio  - Hemoglobin A1c ([facility])  - TSH with free T4 reflex  - start lisinopril (ZESTRIL) 5 MG tablet; Take 1 tablet (5 mg) by mouth daily  Dispense: 30 tablet; Refill: 0 - please let me know if you have dizziness     Lab Testing:  **If you had lab testing today and your results are reassuring or normal they will be mailed to you or sent through [employer] within 7 days.   **If the lab tests need quick action we will call you with the results.  The phone number we will call with results is # [phone] (home) none (work). If this is not the best number please call our clinic and change the number.  Medication Refills:  If you need any refills please call your pharmacy and they will contact us.   If you need to  your refill at a new pharmacy, please contact the new pharmacy directly. The new pharmacy will help you get your medications transferred faster.     Medical Concerns:  If you have urgent medical concerns please call [phone] at any time of the day.    Julianne Hyatt MD

## 2020-09-29 NOTE — PROGRESS NOTES
Preceptor Attestation:   Patient seen, evaluated and discussed with the resident. I have verified the content of the note, which accurately reflects my assessment of the patient and the plan of care.   Supervising Physician:  Candelaria Silva MD

## 2020-10-19 NOTE — PROGRESS NOTES
"Gordon is a 40 year old who presents for   Patient presents with:  RECHECK: Follow up on blood pressure    Assessment and Plan      1. Benign essential hypertension, uncontrolled   Patient with newly diagnosed hypertension, blood pressure remains elevated on lisinopril 5 mg which was started one month ago. Not having side effects of the medication, elected to increase lisinopril to 10 mg with follow up in 1 month. Will recheck lytes at that visit. Does have a blood pressure cuff at home.   - lisinopril (ZESTRIL) 10 MG tablet; Take 1 tablet (10 mg) by mouth daily  Dispense: 90 tablet; Refill: 0    2. Hyperlipidemia LDL goal <100, new diagnosis   ASCVD risk 7.2% borderline risk. Only risk enhancer is LDL>160 (163). Patient is motivated for lifestyle changes including recent increase in exercise and reducing fried chicken intake. Will recheck in ~6 months and consider statin if not improving.    3. HRT  Would like to transition HRT care to hospitals. On estradiol IM 2 mg weekly. Due for labs. Future labs ordered and will discuss next visit. Has enough medication for at least another month.         Return in about 4 weeks (around 11/17/2020) for BP and HRT follow up, in person.    Medications Discontinued During This Encounter   Medication Reason     lisinopril (ZESTRIL) 5 MG tablet Reorder     estradiol (ESTRACE) 2 MG tablet          Julianne Hyatt MD         FAZAL Leyva is a 40 year old who presents for   Patient presents with:  RECHECK: Follow up on blood pressure    1. Hypertension Follow-up  <130/80  Patient seen 9/29/20 consistently elevated BP in clinic, diagnosed benign essential hypertension, started lisinopril 5 mg daily.       Outpatient blood pressures no being checked, does have a blood pressure cuff but felt like checking would \"stress me out\"    Chest Pain? :No     Low Salt Diet: not monitoring salt    Daily NSAID Use? No     Did patient take their HTN pills today/last night as usual?  " Yes    Last Basic Metabolic Panel:  Lab Results   Component Value Date    .3 09/29/2020      Lab Results   Component Value Date    POTASSIUM 3.8 09/29/2020     Lab Results   Component Value Date    CHLORIDE 100.3 09/29/2020     Lab Results   Component Value Date    HEATHER 9.7 09/29/2020     Lab Results   Component Value Date    CO2 27.0 09/29/2020     Lab Results   Component Value Date    BUN 7.6 09/29/2020     Lab Results   Component Value Date    CR 0.8 09/29/2020     Lab Results   Component Value Date    .8 09/29/2020       Adherence and Exercise  Medication side effects: no- not dizzy like before  How often is a medication missed? Never  Exercise:eliptical 4-5 days/week for an average of 45-60 minutes      2. Hyperlipidemia  Screening lipid panel last visit, total cholesterol 227, .     ASCVD 10 year RISK:  Calculated 10/20/20   The 10-year ASCVD risk score (Bonnie FUENTES Jr., et al., 2013) is: 7.2%    Values used to calculate the score:      Age: 40 years      Sex: Male      Is Non- : No      Diabetic: No      Tobacco smoker: Yes      Systolic Blood Pressure: 146 mmHg      Is BP treated: Yes      HDL Cholesterol: 49 mg/dL      Total Cholesterol: 227.1 mg/dL       Problem, Medication and Allergy Lists were reviewed and updated if needed.  Patient is an established patient of this clinic.  Reviewed and updated as needed this visit by clinical staff  Tobacco  Allergies  Meds   Med Hx  Surg Hx  Fam Hx  Soc Hx      Reviewed and updated as needed this visit by Provider               Health Maintenance Due   Topic Date Due     PREVENTIVE CARE VISIT  1980     Pneumococcal Vaccine: Pediatrics (0 to 5 Years) and At-Risk Patients (6 to 64 Years) (1 of 1 - PPSV23) 07/18/1986          Review of Systems:   10 point review of symptoms was otherwise negative except as noted in HPI         Physical Exam:     Vitals:    10/20/20 0759 10/20/20 0802   BP: (!) 149/83 (!) 146/84   BP  "Location: Left arm Left arm   Patient Position: Sitting Sitting   Cuff Size: Adult Large Adult Large   Pulse: 101    Resp: 14    Temp: 98.3  F (36.8  C)    TempSrc: Oral    SpO2: 97%    Weight: 105.8 kg (233 lb 3.2 oz)    Height: 1.88 m (6' 2\")      Body mass index is 29.94 kg/m .  Vital signs normal except elevated BP     Physical Exam  Constitutional:       General: She is not in acute distress.     Appearance: She is well-developed. She is not diaphoretic.   Eyes:      Extraocular Movements: Extraocular movements intact.      Conjunctiva/sclera: Conjunctivae normal.   Cardiovascular:      Rate and Rhythm: Normal rate and regular rhythm.      Heart sounds: No murmur.   Pulmonary:      Effort: Pulmonary effort is normal. No respiratory distress.   Skin:     General: Skin is warm.      Coloration: Skin is not jaundiced.   Neurological:      General: No focal deficit present.      Mental Status: She is alert.   Psychiatric:         Mood and Affect: Mood normal.         Results:   Results from last visit:  Office Visit on 09/29/2020   Component Date Value Ref Range Status     Cholesterol 09/29/2020 227.1* 0.0 - 200.0 mg/dL Final     Cholesterol/HDL Ratio 09/29/2020 4.6  0.0 - 5.0 Final     HDL Cholesterol 09/29/2020 49.0  >40.0 mg/dL Final     Triglycerides 09/29/2020 73.9  0.0 - 150.0 mg/dL Final     VLDL Cholesterol 09/29/2020 14.8  7.0 - 32.0 mg/dL Final     LDL Cholesterol Calculated 09/29/2020 163* 0 - 129 mg/dL Final     Calcium 09/29/2020 9.7  8.5 - 10.1 mg/dL Final     Chloride 09/29/2020 100.3  98.0 - 110.0 mmol/L Final     Carbon Dioxide 09/29/2020 27.0  20.0 - 32.0 mmol/L Final     Creatinine 09/29/2020 0.8  0.7 - 1.3 mg/dL Final     Glucose 09/29/2020 116.8* 70.0 - 99.0 mg'dL Final     Potassium 09/29/2020 3.8  3.3 - 4.5 mmol/L Final     Sodium 09/29/2020 136.3  132.6 - 141.4 mmol/L Final     GFR Estimate 09/29/2020 >90  >60.0 mL/min/1.7 m2 Final     GFR Estimate If Black 09/29/2020 >90  >60.0 mL/min/1.7 " m2 Final     Urea Nitrogen 09/29/2020 7.6  7.0 - 21.0 mg/dL Final     Creatinine Urine 09/29/2020 44  mg/dL Final     Albumin Urine mg/L 09/29/2020 <5  mg/L Final     Albumin Urine mg/g Cr 09/29/2020 Unable to calculate due to low value  0 - 17 mg/g Cr Final     Hemoglobin A1C 09/29/2020 5.5  4.1 - 5.7 % Final     TSH 09/29/2020 0.78  0.40 - 4.00 mU/L Final       Options for treatment and follow-up care were reviewed with the patient. Gordon Stack engaged in the decision making process and verbalized understanding of the options discussed and agreed with the final plan.  Julianne Hyatt MD  [facility]

## 2020-10-20 ENCOUNTER — OFFICE VISIT (OUTPATIENT)
Dept: FAMILY MEDICINE | Facility: CLINIC | Age: 40
End: 2020-10-20
Payer: COMMERCIAL

## 2020-10-20 VITALS
BODY MASS INDEX: 29.93 KG/M2 | TEMPERATURE: 98.3 F | DIASTOLIC BLOOD PRESSURE: 84 MMHG | RESPIRATION RATE: 14 BRPM | WEIGHT: 233.2 LBS | OXYGEN SATURATION: 97 % | HEIGHT: 74 IN | SYSTOLIC BLOOD PRESSURE: 146 MMHG | HEART RATE: 101 BPM

## 2020-10-20 DIAGNOSIS — E78.5 HYPERLIPIDEMIA LDL GOAL <100: ICD-10-CM

## 2020-10-20 DIAGNOSIS — I10 ESSENTIAL HYPERTENSION: ICD-10-CM

## 2020-10-20 DIAGNOSIS — I10 BENIGN ESSENTIAL HYPERTENSION: Primary | ICD-10-CM

## 2020-10-20 DIAGNOSIS — F64.0 GENDER DYSPHORIA IN ADULT: ICD-10-CM

## 2020-10-20 PROCEDURE — 99214 OFFICE O/P EST MOD 30 MIN: CPT | Mod: GC | Performed by: STUDENT IN AN ORGANIZED HEALTH CARE EDUCATION/TRAINING PROGRAM

## 2020-10-20 RX ORDER — AMOXICILLIN 875 MG
TABLET ORAL
COMMUNITY
Start: 2020-10-19 | End: 2020-11-20

## 2020-10-20 RX ORDER — LISINOPRIL 10 MG/1
10 TABLET ORAL DAILY
Qty: 90 TABLET | Refills: 0 | Status: SHIPPED | OUTPATIENT
Start: 2020-10-20 | End: 2020-11-20

## 2020-10-20 ASSESSMENT — MIFFLIN-ST. JEOR: SCORE: 2037.54

## 2020-10-20 NOTE — PATIENT INSTRUCTIONS
Here is the plan from today's visit    1. Essential hypertension  - Increase lisinopril (ZESTRIL) 10 MG tablet; Take 1 tablet (10 mg) by mouth daily  Dispense: 90 tablet; Refill: 0    2. High cholesterol   - We discussed your ASCVD risk today (risk of heart disease, stroke due to your elevated cholesterol) which is 7.2%, considered borderline risk. With the lifestyle changes we discussed and better control of your blood pressure, I'm confident we can get that risk % down!  - Continue your hard work at lifestyle changes with exercising on the Elliptical and cutting back on [employer] fried chicken :)     3. HRT  Scheduled lab only appointment for HRT labs (midcycle) prior to your follow up appointment in one month  - Hemoglobin (HGB) ([facility]); Future  - Hepatic Panel ([facility]); Future  - Testosterone total; Future  - Estradiol; Future    Medical Concerns:  If you have urgent medical concerns please call [phone] at any time of the day.    Julianne Hyatt MD

## 2020-10-20 NOTE — PROGRESS NOTES
Preceptor Attestation:   Patient seen and discussed with the resident. Assessment and plan reviewed with resident and agreed upon.   Supervising Physician:  DO [facility]

## 2020-11-07 ENCOUNTER — HEALTH MAINTENANCE LETTER (OUTPATIENT)
Age: 40
End: 2020-11-07

## 2020-11-16 DIAGNOSIS — F64.0 GENDER DYSPHORIA IN ADULT: ICD-10-CM

## 2020-11-16 DIAGNOSIS — I10 BENIGN ESSENTIAL HYPERTENSION: ICD-10-CM

## 2020-11-16 LAB
ALBUMIN SERPL-MCNC: 4.8 MG/DL (ref 3.8–5)
ALP SERPL-CCNC: 55.2 U/L (ref 31.7–110.7)
ALT SERPL-CCNC: 2.7 U/L (ref 0–45)
AST SERPL-CCNC: 15.4 U/L (ref 0–55)
BILIRUB SERPL-MCNC: 0.4 MG/DL (ref 0.2–1.3)
BILIRUBIN DIRECT: 0.2 MG/DL (ref 0.1–0.3)
BUN SERPL-MCNC: 9.1 MG/DL (ref 7–21)
CALCIUM SERPL-MCNC: 9.8 MG/DL (ref 8.5–10.1)
CHLORIDE SERPLBLD-SCNC: 99.1 MMOL/L (ref 98–110)
CO2 SERPL-SCNC: 25.8 MMOL/L (ref 20–32)
CREAT SERPL-MCNC: 0.8 MG/DL (ref 0.7–1.3)
ESTRADIOL SERPL-MCNC: 220 PG/ML (ref 6–50)
GFR SERPL CREATININE-BSD FRML MDRD: >90 ML/MIN/1.7 M2
GLUCOSE SERPL-MCNC: 106.4 MG'DL (ref 70–99)
HEMOGLOBIN: 13.1 G/DL (ref 13.3–17.7)
POTASSIUM SERPL-SCNC: 3.9 MMOL/L (ref 3.3–4.5)
PROT SERPL-MCNC: 7.4 G/DL (ref 6.8–8.8)
SODIUM SERPL-SCNC: 133.4 MMOL/L (ref 132.6–141.4)

## 2020-11-16 PROCEDURE — 82670 ASSAY OF TOTAL ESTRADIOL: CPT | Performed by: FAMILY MEDICINE

## 2020-11-16 PROCEDURE — 80076 HEPATIC FUNCTION PANEL: CPT

## 2020-11-16 PROCEDURE — 99000 SPECIMEN HANDLING OFFICE-LAB: CPT | Performed by: FAMILY MEDICINE

## 2020-11-16 PROCEDURE — 84403 ASSAY OF TOTAL TESTOSTERONE: CPT | Performed by: FAMILY MEDICINE

## 2020-11-16 PROCEDURE — 36415 COLL VENOUS BLD VENIPUNCTURE: CPT

## 2020-11-16 PROCEDURE — 80048 BASIC METABOLIC PNL TOTAL CA: CPT

## 2020-11-16 PROCEDURE — 85018 HEMOGLOBIN: CPT

## 2020-11-18 LAB — TESTOST SERPL-MCNC: 12 NG/DL (ref 240–950)

## 2020-11-19 NOTE — PROGRESS NOTES
"       FAZAL Leyva is a 40 year old individual that uses pronouns He/Him/His/Himself, She/Her/Hers/Herself and They/Them/Their/Theirs (prefers name instead of pronouns) that presents today for follow up of: feminizing hormone therapy and hypertension.    # HRT transfer of care   Gender identity: trans female    Any special concerns today? no current concerns    Patient transitioning HRT care to [facility] today.  Has been on estradiol IM 2 mg weekly since 2016.  Labs were obtained 11/16/2020 and showed elevated estradiol level of 220, testosterone total 12.    On hormones? YES   - IM estradiol 2 mg daily  - spironolactone 100 mg daily     Shot day of the week? Wednesday        Due for labs? No   Refills of meds needed? Yes    Gender History Intake:  Patient has primary care outside of [facility]? Previously at [facility]  Seeking primary care, hormonal care, surgical care? Established here for primary care in 2017 and hormonal care today  1-How do you identify your gender? Choose all that apply. transfemale   2-What approximately what age did you first feel your gender identity (internal sense of gender) did not match your physical body? 5-6 years old   4- Who knows about your gender identity? Friends, some family members, coworkers. Considers self \"out\" but hasn't directly identified as trans at work. Some coworkers know, some don't. Prefers not to address unless asked. Feels uncomfortable but not threatened when others are unsupportive.   5- Do you have a romantic partner? no  6-How are you \"out?\" dress, hair  7-Have you legally changed your name? no   8-Have you ever seen a health care provider about being transgender? YES previous HRT at [facility], started in 2016  9-What hormones have you been on and for how long?  YES:  Date started: 2016  Type of Hormone: estradiol  10-Ever had any problems with hormone treatment? No   12-What are your goals for hormone therapy ? Changes in body, facial hair. Ok with voice as " "is. Goals are being met and changes are stable at this point.  13-Have you had any gender affirmation surgeries? No   14-Do you want to have surgery now or in future? Not sure at this time  15-Sex assigned at birth? male  16. How do you describe your sexual or romantic orientation? Heterosexual  17-What are your other questions or concerns today?  Is there anything else we should know about you? No       # Hypertension follow up  Patient with a history of hypertension diagnosed in 2017, never had symptoms. Had previously been on lisinopril 20 mg/day which caused dizziness, so stopped after running out 1 year ago. Seen 9/29/20 and restarted lisinopril at lower dose 5 mg/day. BP elevated at last visit 10/20/20 so increased to 10 mg/day. Today reports 10 mg/day is going well with no dizziness or headaches. Patient does have a blood pressure cuff at home but checks infrequently because it \"causes stress\". Has cut down on smoking since last visit. Estimates smoking \"only a few\" cigarettes in the last month. Not having the urge to smoke anymore and no longer in many social situations due to the pandemic.       Sexual Health     Sexual concerns:  No   Hx of sexual abuse:  No   STI History:   Neg    Relationships  Current partners number: 0  Sexual partners    have sperm?  yes   able to get pregnant? yes   Contraception:   Uses condoms every time  Fertility plans?   Not discussed today    ---    History reviewed. No pertinent surgical history.    Patient Active Problem List   Diagnosis     Gender dysphoria in adult     Pancreatic cyst     Benign essential hypertension     Treated H pylori with ulcer     Hyperlipidemia LDL goal <100       Current Outpatient Medications   Medication Sig Dispense Refill     lisinopril (ZESTRIL) 10 MG tablet Take 1 tablet (10 mg) by mouth daily 90 tablet 0     spironolactone (ALDACTONE) 100 MG tablet Take 100 mg by mouth       amoxicillin (AMOXIL) 875 MG tablet        fluticasone (FLONASE) 50 " "MCG/ACT spray Spray 1 spray into both nostrils daily (Patient not taking: Reported on 9/29/2020) 1 Bottle 3       History   Smoking Status     Current Some Day Smoker   Smokeless Tobacco     Never Used        No Known Allergies    Problem, Medication and Allergy Lists were reviewed and are current.         Review of Systems:        General    Fat redistribution: no    Weight change: no HEENT    Voice change: no     Cardiovascular (CV)    Chest Pains: no    Shortness of breath: no Chest    Decreased exercise tolerance:  no    Breast changes/development: no     Gastrointestinal (GI)    Abdominal pain: no    Change in appetite: no Skin    Acne or oily skin: no    Change in hair: no     Genitourinary ()    Decreased spontaneous erections: no    Change in libido: no    New sexual partners: no Musculoskeletal    Leg pain or swelling: no     Psychiatric (Psych)    Depression: no    Anxiety/Panic: no    Mood:  \"great\"                    Physical Exam:     Vitals:    11/20/20 0804 11/20/20 0806   BP: (!) 146/84 137/85   BP Location: Left arm Left arm   Patient Position: Sitting Sitting   Cuff Size: Adult Large Adult Large   Pulse: 100    Resp: 16    Temp: 98.8  F (37.1  C)    TempSrc: Oral    SpO2: 97%    Weight: 105.2 kg (232 lb)    Height: 1.88 m (6' 2\")      BMI= Body mass index is 29.79 kg/m .   Wt Readings from Last 10 Encounters:   11/20/20 105.2 kg (232 lb)   10/20/20 105.8 kg (233 lb 3.2 oz)   09/29/20 104.8 kg (231 lb)   12/04/19 107.7 kg (237 lb 6.4 oz)   11/19/19 106.5 kg (234 lb 12.8 oz)   11/20/18 98.9 kg (218 lb)   09/26/17 92.9 kg (204 lb 12.8 oz)   04/20/17 90.4 kg (199 lb 3.2 oz)   04/11/17 88.5 kg (195 lb 3.2 oz)   03/17/17 91.2 kg (201 lb)     Appearance: Female appearance and dress    GENERAL: healthy, alert and no distress  EYES: eyes grossly normal to inspection  RESP: lungs clear to auscultation - no rales, no rhonchi, no wheezes  CV: Borderline tachycardia 100 bpm, regular rhythm, normal S1 S2, no " S3 or S4 and no murmur, no click or rub  MS: extremities normal- no gross deformities noted, no edema  Psych: alert and oriented; coherent speech, normal rate and volume, able to articulate logical thoughts, able to abstract reason, no tangential thoughts, no hallucinations or delusions.  Affect: Appropriate/mood-congruent           Labs:   Results from last visit:  Orders Only on 11/16/2020   Component Date Value Ref Range Status     Calcium 11/16/2020 9.8  8.5 - 10.1 mg/dL Final     Chloride 11/16/2020 99.1  98.0 - 110.0 mmol/L Final     Carbon Dioxide 11/16/2020 25.8  20.0 - 32.0 mmol/L Final     Creatinine 11/16/2020 0.8  0.7 - 1.3 mg/dL Final     Glucose 11/16/2020 106.4* 70.0 - 99.0 mg'dL Final     Potassium 11/16/2020 3.9  3.3 - 4.5 mmol/L Final     Sodium 11/16/2020 133.4  132.6 - 141.4 mmol/L Final     GFR Estimate 11/16/2020 >90  >60.0 mL/min/1.7 m2 Final     GFR Estimate If Black 11/16/2020 >90  >60.0 mL/min/1.7 m2 Final     Urea Nitrogen 11/16/2020 9.1  7.0 - 21.0 mg/dL Final     Estradiol 11/16/2020 220* 6 - 50 pg/mL Final     Testosterone Total 11/16/2020 12* 240 - 950 ng/dL Final    Comment: This test was developed and its performance characteristics determined by the   [facility].   It has not been cleared or approved by the FDA. The laboratory is regulated   under CLIA as qualified to perform high-complexity testing. This test is used   for clinical purposes. It should not be regarded as investigational or for   research.       Protein Total 11/16/2020 7.4  6.8 - 8.8 g/dL Final     Albumin 11/16/2020 4.8  3.8 - 5.0 mg/dL Final     Alkaline Phosphatase 11/16/2020 55.2  31.7 - 110.7 U/L Final     ALT 11/16/2020 2.7  0.0 - 45.0 U/L Final     AST 11/16/2020 15.4  0.0 - 55.0 U/L Final     Bilirubin Direct 11/16/2020 0.2  0.1 - 0.3 mg/dL Final     Bilirubin Total 11/16/2020 0.4  0.2 - 1.3 mg/dL Final     Hemoglobin 11/16/2020 13.1* 13.3 -  17.7 g/dL Final       Assessment and Plan     1. Gender dysphoria in adult  2. Screening for condition  3. Anemia (hgb 13.1)  Patient transferring HRT care here today and signed MONICO for records from [facility]. Has been on estradiol 2 mg (0.2 mL) IM weekly since 2016. Midcycle labs 11/16 showed elevated estradiol level of 220, testosterone total 12. Unclear at this point why estradiol is this elevated with low dose of estradiol. Confirmed with patient that 11/16 labs were drawn midcycle, and per patient description they seem to be injecting the correct dosage of estradiol. Will have patient send me a picture of their estradiol prescription and plan to repeat labs in the next 1-2 weeks. If estradiol still elevated, will discuss decreasing dose. Commended patient today on cutting down on smoking. Will continue to check in on smoking status at future visits.  - Mildly anemic on hgb check, will add CBC with Plt ([place]); Future with next lab draw  - Estradiol; Future    4. Essential hypertension  Patient diagnosed with hypertension in 2017 after elevated BP readings in clinic, never had symptoms. Had previously been on lisinopril 20 mg/day which caused dizziness. Stopped after running out 1 year ago. Seen 2 months ago and restarted lisinopril at lower dose 5 mg/day. Last visit 10/20/20 increased lisinopril from 5 to 10 mg/day. Blood pressure improved today but still not quite at goal. Discussed importance of blood pressure control given patient's age and estradiol therapy and patient agrees. Patient prefers increasing lisinopril over adding a second antihypertensive or continued monitoring. Will increase lisinopril from 10 to 15 mg/day and recheck at next follow up in 1 month. If BP remains elevated, would recommend lisinopril 10 mg daily and adding hydrochlorothiazide 12.5 mg.   - lisinopril (ZESTRIL) 10 MG tablet; Take 1.5 tablets (15 mg) by mouth daily  Dispense: 90 tablet; Refill: 0      Follow up:  Follow up in  1 month.  [facility]  Questions were elicited and answered.     Yoko Rain, MS3    I was present with the medical student who participated in the service and in the documentation of this note. I have verified the history and personally performed the physical exam and medical decision making, and have verified the content of the note, which accurately reflects my assessment of the patient and the plan of care.   Julianne Hyatt MD

## 2020-11-20 ENCOUNTER — OFFICE VISIT (OUTPATIENT)
Dept: FAMILY MEDICINE | Facility: CLINIC | Age: 40
End: 2020-11-20
Payer: COMMERCIAL

## 2020-11-20 ENCOUNTER — TELEPHONE (OUTPATIENT)
Dept: FAMILY MEDICINE | Facility: CLINIC | Age: 40
End: 2020-11-20

## 2020-11-20 VITALS
HEART RATE: 100 BPM | BODY MASS INDEX: 29.77 KG/M2 | OXYGEN SATURATION: 97 % | RESPIRATION RATE: 16 BRPM | WEIGHT: 232 LBS | SYSTOLIC BLOOD PRESSURE: 137 MMHG | HEIGHT: 74 IN | DIASTOLIC BLOOD PRESSURE: 85 MMHG | TEMPERATURE: 98.8 F

## 2020-11-20 DIAGNOSIS — I10 ESSENTIAL HYPERTENSION: ICD-10-CM

## 2020-11-20 DIAGNOSIS — F64.0 GENDER DYSPHORIA IN ADULT: ICD-10-CM

## 2020-11-20 DIAGNOSIS — Z13.9 SCREENING FOR CONDITION: Primary | ICD-10-CM

## 2020-11-20 PROCEDURE — 99214 OFFICE O/P EST MOD 30 MIN: CPT | Mod: GC | Performed by: STUDENT IN AN ORGANIZED HEALTH CARE EDUCATION/TRAINING PROGRAM

## 2020-11-20 RX ORDER — SPIRONOLACTONE 100 MG/1
100 TABLET, FILM COATED ORAL DAILY
Qty: 90 TABLET | Refills: 1 | Status: SHIPPED | OUTPATIENT
Start: 2020-11-20 | End: 2021-05-16

## 2020-11-20 RX ORDER — ESTRADIOL VALERATE 20 MG/ML
4 INJECTION INTRAMUSCULAR WEEKLY
Qty: 5 ML | Refills: 0 | Status: SHIPPED | OUTPATIENT
Start: 2020-11-20 | End: 2021-02-18

## 2020-11-20 RX ORDER — LISINOPRIL 10 MG/1
15 TABLET ORAL DAILY
Qty: 90 TABLET | Refills: 0 | Status: SHIPPED | OUTPATIENT
Start: 2020-11-20 | End: 2021-01-13

## 2020-11-20 ASSESSMENT — MIFFLIN-ST. JEOR: SCORE: 2032.1

## 2020-11-20 NOTE — PATIENT INSTRUCTIONS
Here is the plan from today's visit    1. HRT  - CBC with Plt ([place]); Future - will tell us more info about your hemoglobin   - Estradiol; Future    2. Essential hypertension  - Increase lisinopril (ZESTRIL) 10 MG tablet; Take 1.5 tablets (15 mg) by mouth daily  Dispense: 90 tablet; Refill: 0    Lab Testing:  **If you had lab testing today and your results are reassuring or normal they will be mailed to you or sent through [employer] within 7 days.   **If the lab tests need quick action we will call you with the results.  The phone number we will call with results is # [phone] (home) none (work). If this is not the best number please call our clinic and change the number.  Medication Refills:  If you need any refills please call your pharmacy and they will contact us.   If you need to  your refill at a new pharmacy, please contact the new pharmacy directly. The new pharmacy will help you get your medications transferred faster.     Medical Concerns:  If you have urgent medical concerns please call [phone] at any time of the day.    Julianne Hyatt MD

## 2020-11-20 NOTE — TELEPHONE ENCOUNTER
Hello, this patient's insurance will not pay for the following medication without a Prior Authorization.     ?  The Patient's insurance company is: [employer] Phone Number: [phone]     The patient's ID number is: [id number]     Drug Name: Depo-Estradiol 5mg/mL oil    NDC: [address]-70     Qty: 5 ml     If needed, our pharmacy's NPI is: [id number]       Please advise if you will pursue a prior authorization for this medication or change the order. Contact [facility] with any questions.     ?   Thanks,     ?  Gonzalo Calvillo CPhT     ?  [facility]?     PH: [phone]    Fax: [phone]

## 2020-11-20 NOTE — PROGRESS NOTES
Preceptor Attestation:   Patient seen, evaluated and discussed with the resident. I have verified the content of the note, which accurately reflects my assessment of the patient and the plan of care.   Supervising Physician:  Brad Garcia MD

## 2020-11-20 NOTE — TELEPHONE ENCOUNTER
Prior Authorization Retail Medication Request    Medication/Dose: estradiol cypionate (DEPO-ESTRADIOL) 5 MG/ML injection  ICD code (if different than what is on RX):  Gender dysphoria in adult [F64.0]  Previously Tried and Failed:  See Chart  Rationale:  See chart    Insurance Name:  [employer]  Insurance ID:  [id number]       Pharmacy Information (if different than what is on RX)  Name:  [facility]  Phone:  [phone]

## 2020-11-23 NOTE — TELEPHONE ENCOUNTER
Prior Authorization Approval    Authorization Effective Date: 10/23/2020  Authorization Expiration Date: 11/23/2021  Medication: estradiol cypionate (DEPO-ESTRADIOL) 5 MG/ML injection--APPROVED  Approved Dose/Quantity:   Reference #:     Insurance Company: [employer] - Phone [phone] Fax [phone]  Expected CoPay:       CoPay Card Available:      Foundation Assistance Needed:    Which Pharmacy is filling the prescription (Not needed for infusion/clinic administered): [facility] [place], [place] - [address]   Pharmacy Notified: Yes  Patient Notified: Yes **Instructed pharmacy to notify patient when script is ready to /ship.**

## 2020-11-23 NOTE — TELEPHONE ENCOUNTER
PA Initiation    Medication: estradiol cypionate (DEPO-ESTRADIOL) 5 MG/ML injection  Insurance Company: [employer] - Phone [phone] Fax [phone]  Pharmacy Filling the Rx: [place], [place] - [address]  Filling Pharmacy Phone: [phone]  Filling Pharmacy Fax: [phone]  Start Date: 11/20/2020

## 2020-11-24 ENCOUNTER — TELEPHONE (OUTPATIENT)
Dept: FAMILY MEDICINE | Facility: CLINIC | Age: 40
End: 2020-11-24

## 2020-11-24 NOTE — TELEPHONE ENCOUNTER
[facility] Prior Authorization Team Request    Medication: estradiol VALERATE 20mg/mL  Dosin.2ml into the muscle once weekly  NDC (required for Medicaid members): [address]-11    Insurance   BIN: [id number]  PCN: [id number]  Grp:   ID: [id number]    CoverMyMeds Key (if applicable):     Additional documentation:     Filling [facility]acy: [facility]  Phone Number: [phone]  Contact:  [facility] [employer] PA (P [id number]) please send all responses to this pool.  Pharmacy NPI (required for Medicaid members):[id number]    Please notify pharmacy of approval/denial      Thank you,    Diana Casillas, PharmD  [facility]  [phone]

## 2020-11-24 NOTE — TELEPHONE ENCOUNTER
Central Prior Authorization Team  Phone: [phone]    PA Initiation    Medication: estradiol VALERATE 20mg/mL  Insurance Company: [employer] - Phone [phone] Fax [phone]  Pharmacy Filling the Rx: [facility] [place], [place] - [address]  Filling Pharmacy Phone: [phone]  Filling Pharmacy Fax:    Start Date: 11/24/2020

## 2020-11-25 NOTE — TELEPHONE ENCOUNTER
Prior Authorization Approval    Authorization Effective Date: 10/25/2020  Authorization Expiration Date: 11/24/2021  Medication: estradiol VALERATE 20mg/mL- APPROVED   Approved Dose/Quantity:   Reference #:     Insurance Company: [employer] - Phone [phone] Fax [phone]  Expected CoPay:       CoPay Card Available:      Foundation Assistance Needed:    Which Pharmacy is filling the prescription (Not needed for infusion/clinic administered): [facility] [place], [place] - [address]  Pharmacy Notified: Yes  Patient Notified:  **Instructed pharmacy to notify patient when script is ready to /ship.**

## 2020-12-21 DIAGNOSIS — Z13.9 SCREENING FOR CONDITION: ICD-10-CM

## 2020-12-21 DIAGNOSIS — F64.0 GENDER DYSPHORIA IN ADULT: ICD-10-CM

## 2020-12-21 LAB
ESTRADIOL SERPL-MCNC: 193 PG/ML (ref 6–50)
HCT VFR BLD AUTO: 42.6 % (ref 40–53)
HEMOGLOBIN: 13.2 G/DL (ref 13.3–17.7)
MCH RBC QN AUTO: 29.3 PG (ref 26.5–35)
MCHC RBC AUTO-ENTMCNC: 31 G/DL (ref 32–36)
MCV RBC AUTO: 94.5 FL (ref 78–100)
PLATELET # BLD AUTO: 240 K/UL (ref 150–450)
RBC # BLD AUTO: 4.51 M/UL (ref 4.4–5.9)
WBC # BLD AUTO: 9.3 K/UL (ref 4–11)

## 2020-12-21 PROCEDURE — 85027 COMPLETE CBC AUTOMATED: CPT

## 2020-12-21 PROCEDURE — 82670 ASSAY OF TOTAL ESTRADIOL: CPT | Performed by: FAMILY MEDICINE

## 2020-12-21 PROCEDURE — 36415 COLL VENOUS BLD VENIPUNCTURE: CPT

## 2021-01-13 ENCOUNTER — OFFICE VISIT (OUTPATIENT)
Dept: FAMILY MEDICINE | Facility: CLINIC | Age: 41
End: 2021-01-13
Payer: COMMERCIAL

## 2021-01-13 VITALS
TEMPERATURE: 98.3 F | HEIGHT: 74 IN | BODY MASS INDEX: 29.39 KG/M2 | OXYGEN SATURATION: 98 % | RESPIRATION RATE: 15 BRPM | SYSTOLIC BLOOD PRESSURE: 138 MMHG | WEIGHT: 229 LBS | HEART RATE: 107 BPM | DIASTOLIC BLOOD PRESSURE: 84 MMHG

## 2021-01-13 DIAGNOSIS — I10 ESSENTIAL HYPERTENSION: Primary | ICD-10-CM

## 2021-01-13 DIAGNOSIS — F64.0 GENDER DYSPHORIA IN ADULT: ICD-10-CM

## 2021-01-13 LAB
BUN SERPL-MCNC: 9.2 MG/DL (ref 7–21)
CALCIUM SERPL-MCNC: 9.5 MG/DL (ref 8.5–10.1)
CHLORIDE SERPLBLD-SCNC: 98.5 MMOL/L (ref 98–110)
CO2 SERPL-SCNC: 26.2 MMOL/L (ref 20–32)
CREAT SERPL-MCNC: 0.8 MG/DL (ref 0.7–1.3)
GFR SERPL CREATININE-BSD FRML MDRD: >90 ML/MIN/1.7 M2
GLUCOSE SERPL-MCNC: 96.9 MG'DL (ref 70–99)
POTASSIUM SERPL-SCNC: 3.5 MMOL/L (ref 3.3–4.5)
SODIUM SERPL-SCNC: 131.3 MMOL/L (ref 132.6–141.4)

## 2021-01-13 PROCEDURE — 80048 BASIC METABOLIC PNL TOTAL CA: CPT | Performed by: STUDENT IN AN ORGANIZED HEALTH CARE EDUCATION/TRAINING PROGRAM

## 2021-01-13 PROCEDURE — 99214 OFFICE O/P EST MOD 30 MIN: CPT | Mod: GE | Performed by: STUDENT IN AN ORGANIZED HEALTH CARE EDUCATION/TRAINING PROGRAM

## 2021-01-13 PROCEDURE — 36415 COLL VENOUS BLD VENIPUNCTURE: CPT | Performed by: STUDENT IN AN ORGANIZED HEALTH CARE EDUCATION/TRAINING PROGRAM

## 2021-01-13 RX ORDER — NEEDLES, DISPOSABLE 25GX5/8"
NEEDLE, DISPOSABLE MISCELLANEOUS
Qty: 25 EACH | Refills: 3 | Status: SHIPPED | OUTPATIENT
Start: 2021-01-13 | End: 2021-05-16

## 2021-01-13 RX ORDER — ESTRADIOL 2 MG/1
TABLET ORAL
COMMUNITY
Start: 2020-02-03 | End: 2021-01-13

## 2021-01-13 RX ORDER — NEEDLES, DISPOSABLE 25GX5/8"
NEEDLE, DISPOSABLE MISCELLANEOUS
Status: CANCELLED | OUTPATIENT
Start: 2021-01-13

## 2021-01-13 RX ORDER — CHLORHEXIDINE GLUCONATE ORAL RINSE 1.2 MG/ML
SOLUTION DENTAL
COMMUNITY
Start: 2020-10-19 | End: 2021-11-17

## 2021-01-13 RX ORDER — IBUPROFEN 600 MG/1
TABLET, FILM COATED ORAL
COMMUNITY
Start: 2020-10-19 | End: 2021-11-17

## 2021-01-13 RX ORDER — LISINOPRIL 5 MG/1
TABLET ORAL
COMMUNITY
Start: 2020-09-29 | End: 2021-01-13

## 2021-01-13 RX ORDER — SYRINGE, DISPOSABLE, 1 ML
SYRINGE, EMPTY DISPOSABLE MISCELLANEOUS
COMMUNITY
Start: 2020-10-20 | End: 2021-03-23

## 2021-01-13 RX ORDER — NEEDLES, DISPOSABLE 25GX5/8"
NEEDLE, DISPOSABLE MISCELLANEOUS
COMMUNITY
Start: 2020-10-20 | End: 2021-01-13

## 2021-01-13 RX ORDER — LISINOPRIL 10 MG/1
10 TABLET ORAL DAILY
Qty: 90 TABLET | Refills: 0 | Status: SHIPPED | OUTPATIENT
Start: 2021-01-13 | End: 2021-04-04

## 2021-01-13 ASSESSMENT — MIFFLIN-ST. JEOR: SCORE: 2010.55

## 2021-01-13 NOTE — PROGRESS NOTES
"Gordon is a 40 year old who presents for   Patient presents with:  RECHECK: follow up on previous labs, no concerns  Refill Request: needles     Assessment and Plan      1. Essential hypertension  Diagnosed 2017 with consistently elevated BP readings in clinic, delayed initiation of antihypertensive due to suspicion of white coat hypertension. Was on lisinopril 20 mg in the past but was symptomatic at this dose (lightheadedn/dizzy) and patient stopped taking it. Re-started lisinopril at 5 mg in 9/2020 with intermittent dose increases up to 15 mg daily starting in 11/2020. Presents today for BP re-check-- readings still elevated (up to 145/94) but stable compared to last visit. Pt not interested in adding another medication (discussed increasing spironolactone or adding hydrochlorothiazide), would rather proceed with more conservative measures and continue to monitor which seems reasonable at this time. Given no change in BP from last visit and for ease of dosing, plan to return to 10 mg daily (so pt doesn't have to cut pill in half).  - decrease lisinopril dose to 10 mg daily    - discussed low salt diet and increasing exercise as able   - monitor BP readings with home cuff if not too anxiety-producing   - return for re-check in November 2021  - Basic Metabolic Panel ([facility])    2. Gender dysphoria in adult  Stable on estradiol 4 mg for several years. Due for follow up in November 2021. Consider decreasing estradiol dose in future if continued concerns re: blood pressure.  - BD HYPODERMIC NEEDLE 18G X 1\" MISC; Use for weekly estradiol injections  Dispense: 25 each; Refill: 3  - B-D HYPODERMIC NEEDLE 23G X 1\" MISC; Use for estradiol weekly injections  Dispense: 25 each; Refill: 3     Return in about 10 months (around 11/1/2021) for in person, with me.    Medications Discontinued During This Encounter   Medication Reason     B-D HYPODERMIC NEEDLE 23G X 1\" MISC Reorder     BD HYPODERMIC NEEDLE 18G X 1\" MISC Reorder "     lisinopril (ZESTRIL) 10 MG tablet      lisinopril (ZESTRIL) 5 MG tablet      estradiol (ESTRACE) 2 MG tablet      Julianne Hyatt MD         FAZAL Leyva is a 40 year old who presents for   Patient presents with:  RECHECK: follow up on previous labs, no concerns  Refill Request: needles     Visit [facility]  1. Blood pressure follow up  Newly diagnosed hypertension, lisinopril 5 mg started 9/2020, increased to 10, still uncontrolled, increased to 15 mg daily 11/20/20. Patient prefered increasing lisinopril over adding a second antihypertensive. Experienced dizziness at dose of 20 mg lisinopril in the past.     Today, pt reports things have been going well on the 15 mg daily dose of lisinopril. Denies lightheadedness or other new symptoms. Feels totally well at home, no illness recently. Has a BP cuff but hasn't been checking BP at home because it is extremely anxiety producing. Discussed possibility of adding another medication (like hydrochlorothiazide), but pt not interested in this option, would like to limit medications. Pt reports less eating out recently, makes chicken and vegetables frequently at home. Hasn't been working out d/t COVID gym closures but would like to get back to this once the gym opens up again. Reports smoking fewer cigarettes recently (usually does this socially and hasn't been seeing many people), estimates down to 10 cigarettes/month. Denies recent nausea, vomiting, fever, chills, diarrhea, constipation.       Problem, Medication and Allergy Lists were reviewed and updated if needed.  Patient is an established patient of this clinic.  Reviewed and updated as needed this visit by clinical staff  Tobacco  Allergies  Meds  Problems  Med Hx  Surg Hx  Fam Hx  Soc Hx        Reviewed and updated as needed this visit by Provider  Tobacco  Allergies  Meds  Problems  Med Hx  Surg Hx  Fam Hx        Health Maintenance Due   Topic Date Due     PREVENTIVE CARE VISIT   "1980     Pneumococcal Vaccine: Pediatrics (0 to 5 Years) and At-Risk Patients (6 to 64 Years) (1 of 1 - PPSV23) 07/18/1986     HEPATITIS C SCREENING  07/18/1998          Review of Systems:   7 point review of symptoms was otherwise negative except as noted in HPI         Physical Exam:     Vitals:    01/13/21 1546 01/13/21 1548   BP: (!) 145/94 138/84   Pulse: 107    Resp: 15    Temp: 98.3  F (36.8  C)    TempSrc: Oral    SpO2: 98%    Weight: 103.9 kg (229 lb)    Height: 1.867 m (6' 1.5\")      Body mass index is 29.8 kg/m .  Vital signs normal except mildly elevated diastolic BP     Physical Exam  Constitutional:       General: She is not in acute distress.     Appearance: She is well-developed. She is not diaphoretic.   Cardiovascular:      Rate and Rhythm: Normal rate.      Heart sounds: No murmur.   Pulmonary:      Effort: Pulmonary effort is normal. No respiratory distress.      Breath sounds: Normal breath sounds. No wheezing.   Neurological:      General: No focal deficit present.      Mental Status: She is alert.   Psychiatric:         Mood and Affect: Mood normal.      Comments: Anxious when discussing blood pressure         Results:   Results are ordered and pending    Options for treatment and follow-up care were reviewed with the patient. Gordon Stack engaged in the decision making process and verbalized understanding of the options discussed and agreed with the final plan.    Jennifer Moore (MS3)    I was present with the medical student who participated in the service and in the documentation of this note. I have verified the history and personally performed the physical exam and medical decision making, and have verified the content of the note, which accurately reflects my assessment of the patient and the plan of care.   Julianne Hyatt MD  [facility]          "

## 2021-01-13 NOTE — PROGRESS NOTES
Preceptor Attestation:   I discussed the patient with the resident. I have verified the content of the note, which accurately reflects my assessment of the patient and the plan of care.   Supervising Physician:  Yasmin Higginbotham MD.

## 2021-01-20 NOTE — PATIENT INSTRUCTIONS
"Patient Education   Here is the plan from today's visit    1. Essential hypertension  - Basic Metabolic Panel ([facility]) to check kidney function on lisinopril  - Decrease lisinopril dose from 15 back to 10 mg (1 tablet)     2. HRT  - BD HYPODERMIC NEEDLE 18G X 1\" MISC; Use for weekly estradiol injections  Dispense: 25 each; Refill: 3  - B-D HYPODERMIC NEEDLE 23G X 1\" MISC; Use for estradiol weekly injections  Dispense: 25 each; Refill: 3      Follow up plan  November 2021 for HRT follow up, sooner if concerns    Lab Testing:  **If you had lab testing today and your results are reassuring or normal they will be mailed to you or sent through [employer] within 7 days.   **If the lab tests need quick action we will call you with the results.  The phone number we will call with results is # [phone] (home) none (work). If this is not the best number please call our clinic and change the number.  Medication Refills:  If you need any refills please call your pharmacy and they will contact us.   If you need to  your refill at a new pharmacy, please contact the new pharmacy directly. The new pharmacy will help you get your medications transferred faster.   Medical Concerns:  If you have urgent medical concerns please call [phone] at any time of the day.    Julianne Hyatt MD       "

## 2021-02-18 DIAGNOSIS — F64.0 GENDER DYSPHORIA IN ADULT: ICD-10-CM

## 2021-02-18 RX ORDER — ESTRADIOL VALERATE 20 MG/ML
4 INJECTION INTRAMUSCULAR WEEKLY
Qty: 5 ML | Refills: 0 | Status: SHIPPED | OUTPATIENT
Start: 2021-02-18 | End: 2021-05-16

## 2021-02-18 NOTE — TELEPHONE ENCOUNTER
Delestrogen 20mg/ml    LAST FILLED DATE: 12-  LAST FILLED STRENGTH: 20 MG/ml  LAST FILLED QTY: 5  LAST OFFICE VISIT: 01-    Gisela ERICKSON CPhT @    [facility]  [address] [place] [address]  Phone: [phone]  Fax: [phone]

## 2021-03-23 DIAGNOSIS — F64.0 GENDER DYSPHORIA IN ADULT: Primary | ICD-10-CM

## 2021-03-23 RX ORDER — SYRINGE, DISPOSABLE, 1 ML
SYRINGE, EMPTY DISPOSABLE MISCELLANEOUS
Qty: 25 EACH | Refills: 1 | Status: SHIPPED | OUTPATIENT
Start: 2021-03-23 | End: 2021-05-16

## 2021-04-19 ENCOUNTER — IMMUNIZATION (OUTPATIENT)
Dept: PEDIATRICS | Facility: CLINIC | Age: 41
End: 2021-04-19
Payer: COMMERCIAL

## 2021-04-19 PROCEDURE — 91300 PR COVID VAC PFIZER DIL RECON 30 MCG/0.3 ML IM: CPT

## 2021-04-19 PROCEDURE — 0001A PR COVID VAC PFIZER DIL RECON 30 MCG/0.3 ML IM: CPT

## 2021-05-10 ENCOUNTER — IMMUNIZATION (OUTPATIENT)
Dept: PEDIATRICS | Facility: CLINIC | Age: 41
End: 2021-05-10
Attending: INTERNAL MEDICINE
Payer: COMMERCIAL

## 2021-05-10 PROCEDURE — 91300 PR COVID VAC PFIZER DIL RECON 30 MCG/0.3 ML IM: CPT

## 2021-05-10 PROCEDURE — 0002A PR COVID VAC PFIZER DIL RECON 30 MCG/0.3 ML IM: CPT

## 2021-05-13 DIAGNOSIS — F64.0 GENDER DYSPHORIA IN ADULT: ICD-10-CM

## 2021-05-13 NOTE — TELEPHONE ENCOUNTER
Spironolactone 100mg last filled 2/10/2021 for #90    Delestrogen 20mg/mL last filled 2/22/2021 for #5mL    Needles/Syringes last filled Feb 2021 as well for #12    Thank you,    Diana Casillas, PharmD  [facility]  [phone]

## 2021-05-16 RX ORDER — SPIRONOLACTONE 100 MG/1
100 TABLET, FILM COATED ORAL DAILY
Qty: 90 TABLET | Refills: 1 | Status: SHIPPED | OUTPATIENT
Start: 2021-05-16 | End: 2021-11-15

## 2021-05-16 RX ORDER — ESTRADIOL VALERATE 20 MG/ML
4 INJECTION INTRAMUSCULAR WEEKLY
Qty: 5 ML | Refills: 0 | Status: SHIPPED | OUTPATIENT
Start: 2021-05-16 | End: 2021-07-08

## 2021-05-16 RX ORDER — NEEDLES, DISPOSABLE 25GX5/8"
NEEDLE, DISPOSABLE MISCELLANEOUS
Qty: 25 EACH | Refills: 3 | Status: SHIPPED | OUTPATIENT
Start: 2021-05-16 | End: 2022-02-04

## 2021-05-16 RX ORDER — NEEDLES, DISPOSABLE 25GX5/8"
NEEDLE, DISPOSABLE MISCELLANEOUS
Qty: 25 EACH | Refills: 3 | Status: SHIPPED | OUTPATIENT
Start: 2021-05-16 | End: 2022-06-10

## 2021-05-16 RX ORDER — SYRINGE, DISPOSABLE, 1 ML
SYRINGE, EMPTY DISPOSABLE MISCELLANEOUS
Qty: 25 EACH | Refills: 1 | Status: SHIPPED | OUTPATIENT
Start: 2021-05-16 | End: 2022-06-10

## 2021-07-08 DIAGNOSIS — F64.0 GENDER DYSPHORIA IN ADULT: ICD-10-CM

## 2021-07-08 RX ORDER — ESTRADIOL VALERATE 20 MG/ML
4 INJECTION INTRAMUSCULAR WEEKLY
Qty: 5 ML | Refills: 0 | Status: SHIPPED | OUTPATIENT
Start: 2021-07-08 | End: 2021-08-25

## 2021-07-08 NOTE — TELEPHONE ENCOUNTER
Estradiol Valerate 20mg/mL last filled 5/17/2021 for #5mL    Thank you,    Diana Casillas, PharmD  [facility]  [phone]

## 2021-08-24 DIAGNOSIS — F64.0 GENDER DYSPHORIA IN ADULT: ICD-10-CM

## 2021-08-24 NOTE — TELEPHONE ENCOUNTER
Estradiol 20mg/mL last filled 7/9/2021 for #5mL    Thank you,    Diana Casillas, PharmD  [facility]  [phone]

## 2021-08-25 RX ORDER — ESTRADIOL VALERATE 20 MG/ML
4 INJECTION INTRAMUSCULAR WEEKLY
Qty: 5 ML | Refills: 0 | Status: SHIPPED | OUTPATIENT
Start: 2021-08-25 | End: 2021-10-13

## 2021-08-25 NOTE — TELEPHONE ENCOUNTER
Estradiol Rx sent. Stable on this dose for 4 years. Due for annual follow up in November 2021.    Julianne Hyatt MD

## 2021-08-26 ENCOUNTER — OFFICE VISIT (OUTPATIENT)
Dept: URGENT CARE | Facility: URGENT CARE | Age: 41
End: 2021-08-26
Payer: COMMERCIAL

## 2021-08-26 VITALS
DIASTOLIC BLOOD PRESSURE: 85 MMHG | OXYGEN SATURATION: 98 % | HEART RATE: 78 BPM | RESPIRATION RATE: 20 BRPM | SYSTOLIC BLOOD PRESSURE: 144 MMHG | TEMPERATURE: 98 F

## 2021-08-26 DIAGNOSIS — R30.0 DYSURIA: Primary | ICD-10-CM

## 2021-08-26 LAB
ALBUMIN UR-MCNC: NEGATIVE MG/DL
APPEARANCE UR: CLEAR
BILIRUB UR QL STRIP: NEGATIVE
COLOR UR AUTO: YELLOW
GLUCOSE UR STRIP-MCNC: NEGATIVE MG/DL
HGB UR QL STRIP: NEGATIVE
KETONES UR STRIP-MCNC: NEGATIVE MG/DL
LEUKOCYTE ESTERASE UR QL STRIP: ABNORMAL
NITRATE UR QL: NEGATIVE
PH UR STRIP: 5.5 [PH] (ref 5–7)
RBC #/AREA URNS AUTO: ABNORMAL /HPF
SP GR UR STRIP: <=1.005 (ref 1–1.03)
SQUAMOUS #/AREA URNS AUTO: ABNORMAL /LPF
UROBILINOGEN UR STRIP-ACNC: 0.2 E.U./DL
WBC #/AREA URNS AUTO: ABNORMAL /HPF

## 2021-08-26 PROCEDURE — 99213 OFFICE O/P EST LOW 20 MIN: CPT | Performed by: FAMILY MEDICINE

## 2021-08-26 PROCEDURE — 81001 URINALYSIS AUTO W/SCOPE: CPT

## 2021-08-26 PROCEDURE — 87086 URINE CULTURE/COLONY COUNT: CPT | Performed by: FAMILY MEDICINE

## 2021-08-26 NOTE — PROGRESS NOTES
"SUBJECTIVE:   Gordon Stack is a 41 year old adult who  presents today for a possible UTI. Symptoms of dysuria and frequency have been going on for few days, states that only hurts at the opening of urethra and is not having typical pain with urination  There is no history of fever, chills, nausea or vomiting.  This patient does not have a history of urinary tract infections.  Patient commented that may have caused minor irritation, was not wearing underwear and got very sweating before symptoms occurred.  Denies any rash in private area.     Past Medical History:   Diagnosis Date     Allergic state      Current Outpatient Medications   Medication Sig Dispense Refill     B-D HYPODERMIC NEEDLE 23G X 1\" MISC Use for estradiol weekly injections 25 each 3     B-D SYRINGE LUER-EUSEBIO 1 ML MISC Use for weekly injections 25 each 1     BD HYPODERMIC NEEDLE 18G X 1\" MISC Use for weekly estradiol injections 25 each 3     chlorhexidine (PERIDEX) 0.12 % solution RINSE MOUTH WITH 2 ALEJANDRO BID.  SPIT AFTER RINSE       estradiol valerate (DELESTROGEN) 20 MG/ML injection Inject 0.2 mLs (4 mg) into the muscle once a week 5 mL 0     ibuprofen (ADVIL/MOTRIN) 600 MG tablet TK ONE T PO Q 6-8 HOURS PRN P       lisinopril (ZESTRIL) 10 MG tablet Take 1 tablet (10 mg) by mouth daily 90 tablet 1     spironolactone (ALDACTONE) 100 MG tablet Take 1 tablet (100 mg) by mouth daily 90 tablet 1     Social History     Tobacco Use     Smoking status: Current Some Day Smoker     Smokeless tobacco: Never Used   Substance Use Topics     Alcohol use: Yes     Comment: occ       ROS:   Review of systems negative except as stated above.    OBJECTIVE:  BP (!) 144/85   Pulse 78   Temp 98  F (36.7  C)   Resp 20   SpO2 98%   GENERAL APPEARANCE: healthy, alert and no distress  PSYCH: mentation appears normal and affect normal/bright    Results for orders placed or performed in visit on 08/26/21   UA macro with reflex to Microscopic and Culture - Clinc Collect   "   Status: Abnormal    Specimen: Urine, Clean Catch   Result Value Ref Range    Color Urine Yellow Colorless, Straw, Light Yellow, Yellow    Appearance Urine Clear Clear    Glucose Urine Negative Negative mg/dL    Bilirubin Urine Negative Negative    Ketones Urine Negative Negative mg/dL    Specific Gravity Urine <=1.005 1.003 - 1.035    Blood Urine Negative Negative    pH Urine 5.5 5.0 - 7.0    Protein Albumin Urine Negative Negative mg/dL    Urobilinogen Urine 0.2 0.2, 1.0 E.U./dL    Nitrite Urine Negative Negative    Leukocyte Esterase Urine Trace (A) Negative   Urine Microscopic     Status: Abnormal   Result Value Ref Range    RBC Urine 0-2 0-2 /HPF /HPF    WBC Urine 0-5 0-5 /HPF /HPF    Squamous Epithelials Urine Few (A) None Seen /LPF    Narrative    Urine Culture not indicated       ASSESSMENT/PLAN:   (R30.0) Dysuria  (primary encounter diagnosis)  Plan: UA macro with reflex to Microscopic and Culture        - Clinc Collect, Urine Microscopic, Urine         Culture, CANCELED: Wet prep - Clinic Collect            Reassurance given, reviewed that UA has slight changes and not concerning for infection.  Will obtain full urine culture and treat if true positive.  Drink plenty of fluids.  Prevention and treatment of UTI's discussed.Signs and symptoms of pyelonephritis mentioned.     Follow up with primary provider if no improvement of symptoms in 1 week    Shine Wing MD

## 2021-08-28 LAB — BACTERIA UR CULT: NO GROWTH

## 2021-09-05 ENCOUNTER — HEALTH MAINTENANCE LETTER (OUTPATIENT)
Age: 41
End: 2021-09-05

## 2021-09-22 DIAGNOSIS — I10 ESSENTIAL HYPERTENSION: ICD-10-CM

## 2021-09-22 NOTE — TELEPHONE ENCOUNTER
Last filled 6/25/21 for #90    Thank you,    Diana Casillas, PharmD  [facility]  [phone]

## 2021-09-24 RX ORDER — LISINOPRIL 10 MG/1
10 TABLET ORAL DAILY
Qty: 90 TABLET | Refills: 1 | Status: SHIPPED | OUTPATIENT
Start: 2021-09-24 | End: 2022-03-07

## 2021-09-24 NOTE — TELEPHONE ENCOUNTER
Lisinopril Rx sent. Patient due for annual visit in November 2021 with repeat BP check at that time. Note added encouraging patient to schedule.    Julianne Hyatt MD

## 2021-10-13 DIAGNOSIS — F64.0 GENDER DYSPHORIA IN ADULT: ICD-10-CM

## 2021-10-13 RX ORDER — ESTRADIOL VALERATE 20 MG/ML
4 INJECTION INTRAMUSCULAR WEEKLY
Qty: 5 ML | Refills: 0 | Status: SHIPPED | OUTPATIENT
Start: 2021-10-13 | End: 2021-12-10

## 2021-10-13 NOTE — TELEPHONE ENCOUNTER
Last filled 8/25/21 for #5mL    Thank you,    Diana Casillas, PharmD  [facility]  [phone]

## 2021-11-03 ENCOUNTER — ALLIED HEALTH/NURSE VISIT (OUTPATIENT)
Dept: FAMILY MEDICINE | Facility: CLINIC | Age: 41
End: 2021-11-03
Payer: COMMERCIAL

## 2021-11-03 VITALS
TEMPERATURE: 98.8 F | DIASTOLIC BLOOD PRESSURE: 82 MMHG | SYSTOLIC BLOOD PRESSURE: 157 MMHG | OXYGEN SATURATION: 95 % | RESPIRATION RATE: 16 BRPM | HEART RATE: 76 BPM

## 2021-11-03 DIAGNOSIS — Z23 NEED FOR PROPHYLACTIC VACCINATION AND INOCULATION AGAINST INFLUENZA: Primary | ICD-10-CM

## 2021-11-03 PROCEDURE — 90686 IIV4 VACC NO PRSV 0.5 ML IM: CPT

## 2021-11-03 PROCEDURE — 90471 IMMUNIZATION ADMIN: CPT

## 2021-11-12 DIAGNOSIS — F64.0 GENDER DYSPHORIA IN ADULT: ICD-10-CM

## 2021-11-15 RX ORDER — SPIRONOLACTONE 100 MG/1
100 TABLET, FILM COATED ORAL DAILY
Qty: 90 TABLET | Refills: 1 | Status: SHIPPED | OUTPATIENT
Start: 2021-11-15 | End: 2022-05-13

## 2021-11-16 NOTE — PROGRESS NOTES
"  Assessment & Plan     Benign essential hypertension  Hyperlipidemia LDL goal <100  Essential hypertension diagnosed 2017, now well controlled on lisinopril 10 mg daily (had unacceptable side effects with higher doses of lisinopril) and spironolactone (on for gender affirming hormone therapy). If elevated/uncontrolled in future, consider increasing [place] vs adding hydrochlorothiazide. Hyperlipidemia with total cholesterol 227,  in the past, will recheck today and consider recommending statin if ASCVD>7.5% and/or LDL >190. Has increased exercise since gyms have reopened and made some dietary changes.   - Lipid Profile; Future  - Comprehensive metabolic panel; Future    Gender dysphoria  Encounter for therapeutic drug monitoring  Stable on estradiol 4 mg for several years. Considered decreasing estradiol dose if continued concerns re: blood pressure, though this has improved. Shot day Friday (today Wednesday). Due for follow up in November 2022.   - Lipid Profile; Future  - Comprehensive metabolic panel; Future  - Hemoglobin; Future  - Testosterone total; Future  - Estradiol; Future    Seasonal allergic rhinitis due to pollen  - fluticasone (FLONASE) 50 MCG/ACT nasal spray; Spray 1 spray into both nostrils daily    Pancreatic cyst  CT A/P obtained 8/2016 for evaluation of abdominal pain showed an indeterminate 0.6 cm probable cystic lesion in the uncinate process of the pancreas. Lipase normal at that time. No episodes of pancreatitis, no family history of pancreatic cancer. A follow-up pancreatic MRI in one year was recommended for further evaluation- I do not see that this was completed. Discussed with patient, she states she spoke with \"a specialist\" and was told she does not need any further imaging.     Healthcare maintenance  - Hepatitis C Screen Reflex to HCV RNA Quant and Genotype; Future  - COVID19 booster offered and accepted today (Pfizer)    Tobacco Cessation:   reports that she has been smoking. " "She has never used smokeless tobacco.  Tobacco Cessation Action Plan: Information offered: Patient not interested at this time Smoking only a few cigarettes/month, very situational-dependent. Denies ever having cravings or buying cigarettes. Plans to quit \"by next year's visit\".     BMI:   Estimated body mass index is 31.36 kg/m  as calculated from the following:    Height as of 1/13/21: 1.867 m (6' 1.5\").    Weight as of this encounter: 109.3 kg (241 lb).   Weight management plan: Discussed healthy diet and exercise guidelines    Return in about 1 year (around 11/17/2022).    Julianne Hyatt MD  [facility] SANDRA Leyva is a 41 year old who presents for the following health issues     HPI     Hypertension Follow-up  1/13/2021 last visit- lisinopril 10 mg daily (since 11/2020), BP still 145/94 with lisinopril 15 mg but component of white coat and no change from prior visit when on 10 mg daily. Pt not interested in adding another medication (discussed increasing spironolactone or adding hydrochlorothiazide), went back to lisinopril 10 mg daily due to burden of pill cutting and no improvement in BP control. Planned to increase exercise- states she has been going to the gym.       Do you check your blood pressure regularly outside of the clinic? No - extremely anxiety producing so does not check    Are you following a low salt diet? No    Are your blood pressures ever more than 140 on the top number (systolic) OR more   than 90 on the bottom number (diastolic), for example 140/90? NA    Gender dysphoria follow up  Gender identity: female     Any special concerns today?  no current concerns    On hormones?  Yes, stable on IM estradiol 4 mg daily for several years   Due for labs?  Yes   - Last labs 12/2020 significant for estradiol 193, hemoglobin 13.2   Refills of meds needed?  Yes  Smoking: infrequent, doesn't buy them (\"$11 is where I draw the line\") (previously ~10 " "cig/month)     ROS  General    Fat redistribution: no    Weight change: no HEENT    Voice change: no     Cardiovascular (CV)    Chest Pains: no    Shortness of breath: no Chest    Decreased exercise tolerance:  no    Breast changes/development: no     Gastrointestinal (GI)    Abdominal pain: no- only when GERD flares (pizza, coffee, alcohol)     Change in appetite: no Skin    Acne or oily skin: no    Change in hair: no     Genitourinary ()    Abnormal vaginal bleeding: not applicable      Musculoskeletal    Leg pain or swelling: no     Psychiatric (Psych)    Depression: no    Anxiety/Panic: no    Mood:  \"good\"                Objective    /83   Pulse 112   Temp 98.4  F (36.9  C) (Oral)   Resp 16   Wt 109.3 kg (241 lb)   SpO2 98%   BMI 31.36 kg/m    Body mass index is 31.36 kg/m .  Physical Exam  Constitutional:       General: She is not in acute distress.     Appearance: She is well-developed. She is not diaphoretic.   Cardiovascular:      Rate and Rhythm: Normal rate and regular rhythm.   Pulmonary:      Effort: Pulmonary effort is normal. No respiratory distress.      Breath sounds: Normal breath sounds.   Neurological:      General: No focal deficit present.      Mental Status: She is alert.   Psychiatric:         Mood and Affect: Mood normal.        No results found for this or any previous visit (from the past 24 hour(s)).          "

## 2021-11-17 ENCOUNTER — OFFICE VISIT (OUTPATIENT)
Dept: FAMILY MEDICINE | Facility: CLINIC | Age: 41
End: 2021-11-17
Payer: COMMERCIAL

## 2021-11-17 VITALS
HEART RATE: 112 BPM | DIASTOLIC BLOOD PRESSURE: 83 MMHG | RESPIRATION RATE: 16 BRPM | BODY MASS INDEX: 31.36 KG/M2 | WEIGHT: 241 LBS | SYSTOLIC BLOOD PRESSURE: 134 MMHG | TEMPERATURE: 98.4 F | OXYGEN SATURATION: 98 %

## 2021-11-17 DIAGNOSIS — Z51.81 ENCOUNTER FOR THERAPEUTIC DRUG MONITORING: ICD-10-CM

## 2021-11-17 DIAGNOSIS — J30.1 SEASONAL ALLERGIC RHINITIS DUE TO POLLEN: ICD-10-CM

## 2021-11-17 DIAGNOSIS — F64.0 GENDER DYSPHORIA IN ADULT: ICD-10-CM

## 2021-11-17 DIAGNOSIS — K26.9 DUODENAL ULCER DUE TO HELICOBACTER PYLORI: ICD-10-CM

## 2021-11-17 DIAGNOSIS — B96.81 DUODENAL ULCER DUE TO HELICOBACTER PYLORI: ICD-10-CM

## 2021-11-17 DIAGNOSIS — E78.5 HYPERLIPIDEMIA LDL GOAL <100: Primary | ICD-10-CM

## 2021-11-17 DIAGNOSIS — Z13.9 SCREENING FOR CONDITION: ICD-10-CM

## 2021-11-17 DIAGNOSIS — K86.2 PANCREATIC CYST: ICD-10-CM

## 2021-11-17 DIAGNOSIS — I10 BENIGN ESSENTIAL HYPERTENSION: ICD-10-CM

## 2021-11-17 LAB
ALBUMIN SERPL-MCNC: 4 G/DL (ref 3.4–5)
ALP SERPL-CCNC: 59 U/L (ref 40–150)
ALT SERPL W P-5'-P-CCNC: 33 U/L (ref 0–70)
ANION GAP SERPL CALCULATED.3IONS-SCNC: 7 MMOL/L (ref 3–14)
AST SERPL W P-5'-P-CCNC: 14 U/L (ref 0–45)
BILIRUB SERPL-MCNC: 0.5 MG/DL (ref 0.2–1.3)
BUN SERPL-MCNC: 16 MG/DL (ref 7–30)
CALCIUM SERPL-MCNC: 9.3 MG/DL (ref 8.5–10.1)
CHLORIDE BLD-SCNC: 103 MMOL/L (ref 94–109)
CHOLEST SERPL-MCNC: 252 MG/DL
CO2 SERPL-SCNC: 27 MMOL/L (ref 20–32)
CREAT SERPL-MCNC: 0.77 MG/DL (ref 0.52–1.25)
ESTRADIOL SERPL-MCNC: 119 PG/ML
FASTING STATUS PATIENT QL REPORTED: ABNORMAL
GFR SERPL CREATININE-BSD FRML MDRD: >90 ML/MIN/1.73M2
GLUCOSE BLD-MCNC: 104 MG/DL (ref 70–99)
HCV AB SERPL QL IA: NONREACTIVE
HDLC SERPL-MCNC: 49 MG/DL
HGB BLD-MCNC: 12.8 G/DL (ref 11.7–17.7)
LDLC SERPL CALC-MCNC: 185 MG/DL
NONHDLC SERPL-MCNC: 203 MG/DL
POTASSIUM BLD-SCNC: 4.4 MMOL/L (ref 3.4–5.3)
PROT SERPL-MCNC: 7.9 G/DL (ref 6.8–8.8)
SODIUM SERPL-SCNC: 137 MMOL/L (ref 133–144)
TRIGL SERPL-MCNC: 91 MG/DL

## 2021-11-17 PROCEDURE — 82670 ASSAY OF TOTAL ESTRADIOL: CPT | Mod: KX | Performed by: STUDENT IN AN ORGANIZED HEALTH CARE EDUCATION/TRAINING PROGRAM

## 2021-11-17 PROCEDURE — 84403 ASSAY OF TOTAL TESTOSTERONE: CPT | Mod: KX | Performed by: STUDENT IN AN ORGANIZED HEALTH CARE EDUCATION/TRAINING PROGRAM

## 2021-11-17 PROCEDURE — 86803 HEPATITIS C AB TEST: CPT | Performed by: STUDENT IN AN ORGANIZED HEALTH CARE EDUCATION/TRAINING PROGRAM

## 2021-11-17 PROCEDURE — 36415 COLL VENOUS BLD VENIPUNCTURE: CPT | Performed by: STUDENT IN AN ORGANIZED HEALTH CARE EDUCATION/TRAINING PROGRAM

## 2021-11-17 PROCEDURE — 85018 HEMOGLOBIN: CPT | Performed by: STUDENT IN AN ORGANIZED HEALTH CARE EDUCATION/TRAINING PROGRAM

## 2021-11-17 PROCEDURE — 80053 COMPREHEN METABOLIC PANEL: CPT | Performed by: STUDENT IN AN ORGANIZED HEALTH CARE EDUCATION/TRAINING PROGRAM

## 2021-11-17 PROCEDURE — 80061 LIPID PANEL: CPT | Performed by: STUDENT IN AN ORGANIZED HEALTH CARE EDUCATION/TRAINING PROGRAM

## 2021-11-17 PROCEDURE — 99214 OFFICE O/P EST MOD 30 MIN: CPT | Mod: 25 | Performed by: STUDENT IN AN ORGANIZED HEALTH CARE EDUCATION/TRAINING PROGRAM

## 2021-11-17 PROCEDURE — 91300 COVID-19,PF,PFIZER (12+ YRS): CPT | Performed by: STUDENT IN AN ORGANIZED HEALTH CARE EDUCATION/TRAINING PROGRAM

## 2021-11-17 PROCEDURE — 0004A COVID-19,PF,PFIZER (12+ YRS): CPT | Performed by: STUDENT IN AN ORGANIZED HEALTH CARE EDUCATION/TRAINING PROGRAM

## 2021-11-17 RX ORDER — FLUTICASONE PROPIONATE 50 MCG
1 SPRAY, SUSPENSION (ML) NASAL DAILY
Qty: 15.8 ML | Refills: 0 | Status: SHIPPED | OUTPATIENT
Start: 2021-11-17 | End: 2022-02-04

## 2021-11-17 NOTE — PROGRESS NOTES
Preceptor Attestation:   Patient seen, evaluated and discussed with the resident. I have verified the content of the note, which accurately reflects my assessment of the patient and the plan of care.   Supervising Physician:  Edita Lopez, DO

## 2021-11-19 LAB — TESTOST SERPL-MCNC: 10 NG/DL (ref 8–950)

## 2021-11-19 NOTE — RESULT ENCOUNTER NOTE
Nikhart Note sent to patient.     The 10-year ASCVD risk score (Bonnie FUENTES Jr., et al., 2013) is: 8.1%    Values used to calculate the score:      Age: 41 years      Sex: Male      Is Non- : No      Diabetic: No      Tobacco smoker: Yes      Systolic Blood Pressure: 134 mmHg      Is BP treated: Yes      HDL Cholesterol: 49 mg/dL      Total Cholesterol: 252 mg/dL

## 2021-12-10 DIAGNOSIS — F64.0 GENDER DYSPHORIA IN ADULT: ICD-10-CM

## 2021-12-10 RX ORDER — ESTRADIOL VALERATE 20 MG/ML
4 INJECTION INTRAMUSCULAR WEEKLY
Qty: 5 ML | Refills: 1 | Status: SHIPPED | OUTPATIENT
Start: 2021-12-10 | End: 2022-03-28

## 2021-12-26 ENCOUNTER — HEALTH MAINTENANCE LETTER (OUTPATIENT)
Age: 41
End: 2021-12-26

## 2022-02-04 DIAGNOSIS — F64.0 GENDER DYSPHORIA IN ADULT: ICD-10-CM

## 2022-02-04 DIAGNOSIS — J30.1 SEASONAL ALLERGIC RHINITIS DUE TO POLLEN: ICD-10-CM

## 2022-02-04 RX ORDER — NEEDLES, DISPOSABLE 25GX5/8"
NEEDLE, DISPOSABLE MISCELLANEOUS
Qty: 25 EACH | Refills: 3 | Status: SHIPPED | OUTPATIENT
Start: 2022-02-04 | End: 2022-03-28

## 2022-02-04 RX ORDER — FLUTICASONE PROPIONATE 50 MCG
1 SPRAY, SUSPENSION (ML) NASAL DAILY
Qty: 15.8 ML | Refills: 1 | Status: SHIPPED | OUTPATIENT
Start: 2022-02-04

## 2022-02-04 NOTE — TELEPHONE ENCOUNTER
"Request for medication refill: hypodermic needle, flonase     Providers if patient needs an appointment and you are willing to give a one month supply please refill for one month and  send a letter/MyChart using \".SMILLIMITEDREFILL\" .smillimited and route chart to \"P SMI \" (Giving one month refill in non controlled medications is strongly recommended before denial)    If refill has been denied, meaning absolutely no refills without visit, please complete the smart phrase \".smirxrefuse\" and route it to the \"P SMI MED REFILLS\"  pool to inform the patient and the pharmacy.    Elisa Michaels        "

## 2022-03-04 DIAGNOSIS — I10 ESSENTIAL HYPERTENSION: ICD-10-CM

## 2022-03-07 DIAGNOSIS — I10 ESSENTIAL HYPERTENSION: ICD-10-CM

## 2022-03-07 RX ORDER — LISINOPRIL 10 MG/1
10 TABLET ORAL DAILY
Qty: 90 TABLET | Refills: 1 | Status: SHIPPED | OUTPATIENT
Start: 2022-03-07 | End: 2022-09-13

## 2022-03-28 DIAGNOSIS — F64.0 GENDER DYSPHORIA IN ADULT: ICD-10-CM

## 2022-03-28 RX ORDER — NEEDLES, DISPOSABLE 25GX5/8"
NEEDLE, DISPOSABLE MISCELLANEOUS
Qty: 25 EACH | Refills: 3 | Status: SHIPPED | OUTPATIENT
Start: 2022-03-28 | End: 2023-04-12

## 2022-03-28 RX ORDER — ESTRADIOL VALERATE 20 MG/ML
4 INJECTION INTRAMUSCULAR WEEKLY
Qty: 5 ML | Refills: 1 | Status: SHIPPED | OUTPATIENT
Start: 2022-03-28 | End: 2022-04-01

## 2022-04-01 ENCOUNTER — TELEPHONE (OUTPATIENT)
Dept: URGENT CARE | Facility: URGENT CARE | Age: 42
End: 2022-04-01
Payer: COMMERCIAL

## 2022-04-01 DIAGNOSIS — F64.0 GENDER DYSPHORIA IN ADULT: Primary | ICD-10-CM

## 2022-04-01 RX ORDER — ESTRADIOL VALERATE 40 MG/ML
4 INJECTION INTRAMUSCULAR WEEKLY
Qty: 5 ML | Refills: 1 | Status: SHIPPED | OUTPATIENT
Start: 2022-04-01 | End: 2022-11-11

## 2022-04-01 NOTE — TELEPHONE ENCOUNTER
Hi Dr. Hyatt,    Estradiol 20mg/ml is on backorder from our supplier until July.  Estradiol 40mg/ml is currently available.      Would you please send a prescription for the 40mg/ml to replace the 20mg/ml rx with appropriate dose adjusted for the concentration?     Thanks!    Kamron Thompson, Pharmacist  [facility]  [facility]  [address] [place] [address]  Phone: [phone]  Fax: [phone]

## 2022-04-01 NOTE — TELEPHONE ENCOUNTER
___This is originally the 20mg / ml however that strength is on back order until late July 2022. ___      [facility] Prior Authorization Team Request    Medication: estradiol valerate 40mg/ml injection  Dosing: Inject 0.1ml (4mg) into the muscle once a week  NDC (required for Medicaid members): [address]-14    Insurance hp commercial  BIN: [id number]  PCN: [id number]  Grp: n/a  ID: [id number]    CoverMyMeds Key (if applicable):     Additional documentation:       Nura Whitney @  [facility]:[place]   Phone Number: [phone]  Contact: [facility] [employer] [facility] ([id number]) please send all responses to this pool.  Pharmacy NPI (required for Medicaid members):[id number]

## 2022-04-01 NOTE — TELEPHONE ENCOUNTER
PA Initiation    Medication: Estradiol valerate 40mg/ml  Insurance Company: [employer] - Phone [phone] Fax [phone]  Pharmacy Filling the Rx: [facility] [place], [place] - [address]  Filling Pharmacy Phone: [phone]  Filling Pharmacy Fax:    Start Date: 4/1/2022    Central Prior Authorization Team   Phone: [phone]      Demographics have been sent to CMM  Will follow up to answer once received

## 2022-04-04 NOTE — TELEPHONE ENCOUNTER
Prior Authorization Approval    Authorization Effective Date: 3/3/2022  Authorization Expiration Date: 4/2/2023  Medication: Estradiol valerate 40mg/ml  Approved Dose/Quantity:   Reference #:     Insurance Company: [employer] - Phone [phone] Fax [phone]  Which Pharmacy is filling the prescription (Not needed for infusion/clinic administered): [facility] [place], [place] - [address]  Pharmacy Notified: Yes  Patient Notified: Yes

## 2022-05-13 DIAGNOSIS — F64.0 GENDER DYSPHORIA IN ADULT: ICD-10-CM

## 2022-05-13 RX ORDER — SPIRONOLACTONE 100 MG/1
100 TABLET, FILM COATED ORAL DAILY
Qty: 90 TABLET | Refills: 1 | Status: SHIPPED | OUTPATIENT
Start: 2022-05-13 | End: 2022-11-11

## 2022-05-31 ENCOUNTER — VIRTUAL VISIT (OUTPATIENT)
Dept: INTERNAL MEDICINE | Facility: CLINIC | Age: 42
End: 2022-05-31
Payer: COMMERCIAL

## 2022-05-31 DIAGNOSIS — R21 RASH AND NONSPECIFIC SKIN ERUPTION: Primary | ICD-10-CM

## 2022-05-31 PROCEDURE — 99213 OFFICE O/P EST LOW 20 MIN: CPT | Mod: GT | Performed by: INTERNAL MEDICINE

## 2022-05-31 NOTE — PATIENT INSTRUCTIONS
Plan:  1. Local hydrocortisone cream over the counter 2-3 times a day as needed for itching  2. If redness around can be signs of infection and you need reevaluation

## 2022-05-31 NOTE — PROGRESS NOTES
"Gordon is a 41 year old who is being evaluated via a billable video visit.      How would you like to obtain your AVS? Mail a copy  If the video visit is dropped, the invitation should be resent by: Text to cell phone: [phone]  Will anyone else be joining your video visit? No           This is a VIDEO ( using [employer])  encounter with the patient.       Location of the provider : office   Location of the patient : home      08:00 --- 08:10                 Dr Quispe's note      Patient's instructions / PLAN:                                                        Plan:  1. Local hydrocortisone cream over the counter 2-3 times a day as needed for itching  2. If redness around can be signs of infection and you need reevaluation       ASSESSMENT & PLAN:                                                      (R21) Rash and nonspecific skin eruption  (primary encounter diagnosis)  Comment: possible sec hot humid weather yesterday   Plan: as above        Chief complaint:                                                      Skin rash     SUBJECTIVE:                                                    History of present illness:    She woke up w several pink spots on both lateral thorax. Mildly itching.       Review of Systems:                                                      ROS: negative for fever, chills, cough, wheezes, chest pain, shortness of breath, vomiting, abdominal pain, leg swelling       OBJECTIVE:           An actual physical exam can't be done during phone visit   A limited exam can sometimes be performed by video visit   NAD  Picture not very good quality, but small red spots on both lateral thorax.       PMHx: reviewed  Past Medical History:   Diagnosis Date     Allergic state       PSHx: reviewed  History reviewed. No pertinent surgical history.     Meds: reviewed  Current Outpatient Medications   Medication Sig Dispense Refill     B-D HYPODERMIC NEEDLE 23G X 1\" MISC Use for estradiol weekly injections " "25 each 3     B-D SYRINGE LUER-EUSEBIO 1 ML MISC Use for weekly injections 25 each 1     BD HYPODERMIC NEEDLE 18G X 1\" MISC Use for weekly estradiol injections 25 each 3     estradiol valerate (DELESTROGEN) 40 MG/ML injection Inject 0.1 mLs (4 mg) into the muscle once a week 5 mL 1     fluticasone (FLONASE) 50 MCG/ACT nasal spray SPRAY 1 SPRAY INTO BOTH NOSTRILS DAILY 15.8 mL 1     lisinopril (ZESTRIL) 10 MG tablet Take 1 tablet (10 mg) by mouth daily Please schedule your annual check up for November 2021 90 tablet 1     spironolactone (ALDACTONE) 100 MG tablet Take 1 tablet (100 mg) by mouth daily 90 tablet 1       Soc Hx: reviewed  Fam Hx: reviewed          Slime Quispe MD  Internal Medicine     "

## 2022-06-08 DIAGNOSIS — F64.0 GENDER DYSPHORIA IN ADULT: ICD-10-CM

## 2022-06-10 RX ORDER — SYRINGE, DISPOSABLE, 1 ML
SYRINGE, EMPTY DISPOSABLE MISCELLANEOUS
Qty: 25 EACH | Refills: 1 | Status: SHIPPED | OUTPATIENT
Start: 2022-06-10 | End: 2022-06-14

## 2022-06-10 RX ORDER — NEEDLES, DISPOSABLE 25GX5/8"
NEEDLE, DISPOSABLE MISCELLANEOUS
Qty: 25 EACH | Refills: 3 | Status: SHIPPED | OUTPATIENT
Start: 2022-06-10 | End: 2022-06-14

## 2022-06-13 DIAGNOSIS — F64.0 GENDER DYSPHORIA IN ADULT: ICD-10-CM

## 2022-06-14 RX ORDER — SYRINGE, DISPOSABLE, 1 ML
SYRINGE, EMPTY DISPOSABLE MISCELLANEOUS
Qty: 25 EACH | Refills: 1 | Status: SHIPPED | OUTPATIENT
Start: 2022-06-14 | End: 2023-04-12

## 2022-06-14 RX ORDER — NEEDLES, DISPOSABLE 25GX5/8"
NEEDLE, DISPOSABLE MISCELLANEOUS
Qty: 25 EACH | Refills: 3 | Status: SHIPPED | OUTPATIENT
Start: 2022-06-14 | End: 2022-08-07

## 2022-06-20 DIAGNOSIS — F64.0 GENDER DYSPHORIA IN ADULT: ICD-10-CM

## 2022-06-21 RX ORDER — NEEDLES, DISPOSABLE 25GX5/8"
NEEDLE, DISPOSABLE MISCELLANEOUS
Qty: 25 EACH | Refills: 3 | OUTPATIENT
Start: 2022-06-21

## 2022-06-21 RX ORDER — SYRINGE, DISPOSABLE, 1 ML
SYRINGE, EMPTY DISPOSABLE MISCELLANEOUS
Qty: 25 EACH | Refills: 1 | OUTPATIENT
Start: 2022-06-21

## 2022-08-04 DIAGNOSIS — F64.0 GENDER DYSPHORIA IN ADULT: Primary | ICD-10-CM

## 2022-08-04 RX ORDER — ESTRADIOL VALERATE 20 MG/ML
20 INJECTION INTRAMUSCULAR
COMMUNITY
End: 2022-08-05

## 2022-08-05 NOTE — TELEPHONE ENCOUNTER
[facility] phone call message- patient requesting to speak with PCP or provider:    PCP: Kaya Maciel    Additional Comments: Patient completely out of med and 23G needle, wiring to triage and have precepting doctor sign off on, if appropriate. Notify patient upon fill.     Is a call back needed? Yes    Patient informed that it may take up to 2 business days to hear back from PCP:Yes    OK to leave a message on voice mail? Yes    Primary language: English      needed? No    Call taken on August 5, 2022 at 12:19 PM by Simon Shah

## 2022-08-07 RX ORDER — ESTRADIOL VALERATE 20 MG/ML
20 INJECTION INTRAMUSCULAR
Qty: 1 ML | Refills: 0 | Status: SHIPPED | OUTPATIENT
Start: 2022-08-07 | End: 2022-09-20

## 2022-10-23 ENCOUNTER — HEALTH MAINTENANCE LETTER (OUTPATIENT)
Age: 42
End: 2022-10-23

## 2022-11-11 ENCOUNTER — OFFICE VISIT (OUTPATIENT)
Dept: FAMILY MEDICINE | Facility: CLINIC | Age: 42
End: 2022-11-11
Payer: COMMERCIAL

## 2022-11-11 VITALS
RESPIRATION RATE: 18 BRPM | WEIGHT: 244 LBS | TEMPERATURE: 97.9 F | HEART RATE: 103 BPM | SYSTOLIC BLOOD PRESSURE: 137 MMHG | HEIGHT: 73 IN | DIASTOLIC BLOOD PRESSURE: 87 MMHG | OXYGEN SATURATION: 97 % | BODY MASS INDEX: 32.34 KG/M2

## 2022-11-11 DIAGNOSIS — F64.0 GENDER DYSPHORIA IN ADULT: Primary | ICD-10-CM

## 2022-11-11 LAB
ALBUMIN SERPL BCG-MCNC: 4.6 G/DL (ref 3.5–5.2)
ALP SERPL-CCNC: 59 U/L (ref 35–129)
ALT SERPL W P-5'-P-CCNC: 19 U/L (ref 10–50)
ANION GAP SERPL CALCULATED.3IONS-SCNC: 16 MMOL/L (ref 7–15)
AST SERPL W P-5'-P-CCNC: 24 U/L (ref 10–50)
BILIRUB SERPL-MCNC: 0.3 MG/DL
BUN SERPL-MCNC: 11.2 MG/DL (ref 6–20)
CALCIUM SERPL-MCNC: 9.7 MG/DL (ref 8.6–10)
CHLORIDE SERPL-SCNC: 100 MMOL/L (ref 98–107)
CHOLEST SERPL-MCNC: 243 MG/DL
CREAT SERPL-MCNC: 0.78 MG/DL (ref 0.51–1.17)
DEPRECATED HCO3 PLAS-SCNC: 21 MMOL/L (ref 22–29)
ERYTHROCYTE [DISTWIDTH] IN BLOOD BY AUTOMATED COUNT: 12.5 % (ref 10–15)
ESTRADIOL SERPL-MCNC: 138 PG/ML
GFR SERPL CREATININE-BSD FRML MDRD: >90 ML/MIN/1.73M2
GLUCOSE SERPL-MCNC: 90 MG/DL (ref 70–99)
HCT VFR BLD AUTO: 40.6 % (ref 35–53)
HDLC SERPL-MCNC: 51 MG/DL
HGB BLD-MCNC: 13.2 G/DL (ref 11.7–17.7)
LDLC SERPL CALC-MCNC: 171 MG/DL
MCH RBC QN AUTO: 29.1 PG (ref 26.5–33)
MCHC RBC AUTO-ENTMCNC: 32.5 G/DL (ref 31.5–36.5)
MCV RBC AUTO: 89 FL (ref 78–100)
NONHDLC SERPL-MCNC: 192 MG/DL
PLATELET # BLD AUTO: 303 10E3/UL (ref 150–450)
POTASSIUM SERPL-SCNC: 4.1 MMOL/L (ref 3.4–5.3)
PROT SERPL-MCNC: 7.5 G/DL (ref 6.4–8.3)
RBC # BLD AUTO: 4.54 10E6/UL (ref 3.8–5.9)
SODIUM SERPL-SCNC: 137 MMOL/L (ref 136–145)
TRIGL SERPL-MCNC: 107 MG/DL
WBC # BLD AUTO: 12 10E3/UL (ref 4–11)

## 2022-11-11 PROCEDURE — 99214 OFFICE O/P EST MOD 30 MIN: CPT | Mod: GC | Performed by: STUDENT IN AN ORGANIZED HEALTH CARE EDUCATION/TRAINING PROGRAM

## 2022-11-11 PROCEDURE — 85027 COMPLETE CBC AUTOMATED: CPT | Performed by: STUDENT IN AN ORGANIZED HEALTH CARE EDUCATION/TRAINING PROGRAM

## 2022-11-11 PROCEDURE — 82670 ASSAY OF TOTAL ESTRADIOL: CPT | Performed by: STUDENT IN AN ORGANIZED HEALTH CARE EDUCATION/TRAINING PROGRAM

## 2022-11-11 PROCEDURE — 36415 COLL VENOUS BLD VENIPUNCTURE: CPT | Performed by: STUDENT IN AN ORGANIZED HEALTH CARE EDUCATION/TRAINING PROGRAM

## 2022-11-11 PROCEDURE — 80061 LIPID PANEL: CPT | Performed by: STUDENT IN AN ORGANIZED HEALTH CARE EDUCATION/TRAINING PROGRAM

## 2022-11-11 PROCEDURE — 80053 COMPREHEN METABOLIC PANEL: CPT | Performed by: STUDENT IN AN ORGANIZED HEALTH CARE EDUCATION/TRAINING PROGRAM

## 2022-11-11 RX ORDER — ESTRADIOL VALERATE 20 MG/ML
20 INJECTION INTRAMUSCULAR
Qty: 1 ML | Refills: 3 | Status: SHIPPED | OUTPATIENT
Start: 2022-11-11 | End: 2023-04-12

## 2022-11-11 RX ORDER — SPIRONOLACTONE 100 MG/1
100 TABLET, FILM COATED ORAL DAILY
Qty: 90 TABLET | Refills: 3 | Status: SHIPPED | OUTPATIENT
Start: 2022-11-11

## 2022-11-11 NOTE — PROGRESS NOTES
"          FAZAL Leyva is a 42 year old individual that uses pronouns   (none)  that presents today for follow up of:  feminizing hormone therapy. Gender identity: female    Any special concerns today?  no current concerns    On hormones?  YES   Due for labs?  Yes      +++ Refills of meds needed?  Yes  ---    No past surgical history on file.    Patient Active Problem List   Diagnosis     Gender dysphoria in adult     Pancreatic cyst     Benign essential hypertension     Treated H pylori with ulcer     Hyperlipidemia LDL goal <100     Globus sensation     Nasal congestion     Tobacco abuse       Current Outpatient Medications   Medication Sig Dispense Refill     B-D HYPODERMIC NEEDLE 23G X 1\" MISC Use for estradiol weekly injections 25 each 3     B-D SYRINGE LUER-EUSEBIO 1 ML MISC Use for weekly injections 25 each 1     BD HYPODERMIC NEEDLE 18G X 1\" MISC Use for weekly estradiol injections 25 each 3     estradiol valerate (DELESTROGEN) 20 MG/ML injection INJECT 0.2ML INTRAMUSCULARLY ONCE A WEEK. DISCARD VIAL 28 DAYS AFTER FIRST USE 5 mL 0     estradiol valerate (DELESTROGEN) 20 MG/ML injection Inject 1 mL (20 mg) into the muscle every 28 days 1 mL 0     estradiol valerate (DELESTROGEN) 40 MG/ML injection Inject 0.1 mLs (4 mg) into the muscle once a week 5 mL 1     fluticasone (FLONASE) 50 MCG/ACT nasal spray SPRAY 1 SPRAY INTO BOTH NOSTRILS DAILY 15.8 mL 1     lisinopril (ZESTRIL) 10 MG tablet Take 1 tablet (10 mg) by mouth daily Please schedule your annual check up for November 2021 90 tablet 1     spironolactone (ALDACTONE) 100 MG tablet Take 1 tablet (100 mg) by mouth daily 90 tablet 1       History   Smoking Status     Some Days   Smokeless Tobacco     Never        No Known Allergies    Problem, Medication and Allergy Lists were reviewed and updated if needed..         Review of Systems:      General    Fat redistribution: YES    Weight change: YES HEENT    Voice change: No     Cardiovascular (CV)    Chest Pains: " "No    Shortness of breath: No Chest    Decreased exercise tolerance:  No    Breast changes/development: YES     Gastrointestinal (GI)    Abdominal pain: No    Change in appetite: No Skin    Acne or oily skin: No    Change in hair: YES     Genitourinary ()    Abnormal vaginal bleeding: No     Decreased spontaneous erections: YES    Change in libido: No    New sexual partners: No Musculoskeletal    Leg pain or swelling: No     Psychiatric (Psych)    Depression: No    Anxiety/Panic: No    Mood:  \"good\"                    Physical Exam:     Vitals:    11/11/22 1315   BP: 137/87   Pulse: 103   Resp: 18   Temp: 97.9  F (36.6  C)   SpO2: 97%   Weight: 110.7 kg (244 lb)   Height: 1.854 m (6' 1\")     BMI= Body mass index is 32.19 kg/m .   Wt Readings from Last 10 Encounters:   11/11/22 110.7 kg (244 lb)   11/17/21 109.3 kg (241 lb)   01/13/21 103.9 kg (229 lb)   11/20/20 105.2 kg (232 lb)   10/20/20 105.8 kg (233 lb 3.2 oz)   09/29/20 104.8 kg (231 lb)   12/04/19 107.7 kg (237 lb 6.4 oz)   11/19/19 106.5 kg (234 lb 12.8 oz)   11/20/18 98.9 kg (218 lb)   09/26/17 92.9 kg (204 lb 12.8 oz)     GENERAL:: healthy, alert and no distress  EYES: Eyes grossly normal to inspection, fundi benign and PERRL  RESP: lungs clear to auscultation - no rales, no rhonchi, no wheezes  CV: regular rates and rhythm, normal S1 S2, no S3 or S4 and no murmur, no click or rub -  MS: extremities normal- no gross deformities noted, no edema  SKIN: no suspicious lesions, no rashes  Psych: Alert and oriented times 3; coherent speech, normal rate and volume, able to articulate logical thoughts, able to abstract reason, no tangential thoughts, no hallucinations or delusions. Affect is mood congurent.           Labs:   Results from last visit:  Office Visit on 11/17/2021   Component Date Value Ref Range Status     Cholesterol 11/17/2021 252 (H)  <200 mg/dL Final     Triglycerides 11/17/2021 91  <150 mg/dL Final     Direct Measure HDL 11/17/2021 49  >=40 " mg/dL Final     LDL Cholesterol Calculated 11/17/2021 185 (H)  <=100 mg/dL Final     Non HDL Cholesterol 11/17/2021 203 (H)  <130 mg/dL Final     Patient Fasting > 8hrs? 11/17/2021 Unknown   Final     Sodium 11/17/2021 137  133 - 144 mmol/L Final     Potassium 11/17/2021 4.4  3.4 - 5.3 mmol/L Final     Chloride 11/17/2021 103  94 - 109 mmol/L Final    0-20 years:       Female:  mmol/L  Male:    mmol/L       20 years and older:   Female:  mmol/L   Male:    mmol/L       Carbon Dioxide (CO2) 11/17/2021 27  20 - 32 mmol/L Final     Anion Gap 11/17/2021 7  3 - 14 mmol/L Final     Urea Nitrogen 11/17/2021 16  7 - 30 mg/dL Final    Female  0 to 15 days           3-23  15 days to 1 year      3-17  1 to 10 years          9-22  10 to 19 years         7-19  19 years and older     7-30    Male  0 to 15 days           3-23  15 days to 1 year      3-17  1 to 10 years          9-22  10 to 19 years         7-21  19 years and older     7-30     Creatinine 11/17/2021 0.77  0.52 - 1.25 mg/dL Final    20 y and older Female 0.52-1.04 mg/dL  20 y and older Male 0.66-1.25 mg/dL    Varies with the amount of muscle mass present.    GICH  Female: 0.6-1.2 mg/dL  Male: 0.7-1.3 mg/dL       Calcium 11/17/2021 9.3  8.5 - 10.1 mg/dL Final     Glucose 11/17/2021 104 (H)  70 - 99 mg/dL Final     Alkaline Phosphatase 11/17/2021 59  40 - 150 U/L Final    Female:    0-3 years  110-320 U/L  4-9 years  150-420 U/L  10-11 years  130-560 U/L  12-13 years  105-420 U/L  14-15 years   U/L  16 years and older   U/L      Male:  0-3 years  110-320 U/L  4-9 years  150-420 U/L  10-15 years  130-530 U/L  16-19 years   U/L  20 years and older   U/L       AST 11/17/2021 14  0 - 45 U/L Final     ALT 11/17/2021 33  0 - 70 U/L Final    Female    All ages 0-50 U/L    Male    0 - 19 years       0-50 U/L  20 years and older 0-70 U/L            Protein Total 11/17/2021 7.9  6.8 - 8.8 g/dL Final     Albumin 11/17/2021 4.0  3.4  - 5.0 g/dL Final     Bilirubin Total 11/17/2021 0.5  0.2 - 1.3 mg/dL Final     GFR Estimate 11/17/2021 >90  >60 mL/min/1.73m2 Final    GFR not calculated when sex unspecified or nonbinary.  As of July 11, 2021, eGFR is calculated by the CKD-EPI creatinine equation, without race adjustment. eGFR can be influenced by muscle mass, exercise, and diet. The reported eGFR is an estimation only and is only applicable if the renal function is stable.     Hemoglobin 11/17/2021 12.8  11.7 - 17.7 g/dL Final    Sex Specific Reference Ranges:    Female  11.7-15.7  g/dL  Male    13.3-17.7   g/dL       Testosterone Total 11/17/2021 10  8 - 950 ng/dL Final     Estradiol 11/17/2021 119  pg/mL Final    Estradiol reference ranges for males:  Not determined - 39.8    Ultrasensitive estradial (ES2) recommended.     Hepatitis C Antibody 11/17/2021 Nonreactive  Nonreactive Final       Assessment and Plan     Gender dysphoria in adult  - estradiol valerate 0.2ml qweekly    Follow up:  Follow up in 1 year.  Results by mychart  Questions were elicited and answered.     Kaya Maciel MD

## 2022-11-11 NOTE — PROGRESS NOTES
Preceptor Attestation:    I discussed the patient with the resident and evaluated the patient in person. I have verified the content of the note, which accurately reflects my assessment of the patient and the plan of care.   Supervising Physician:  Kyle Turner MD.

## 2022-11-21 ENCOUNTER — OFFICE VISIT (OUTPATIENT)
Dept: FAMILY MEDICINE | Facility: CLINIC | Age: 42
End: 2022-11-21
Payer: COMMERCIAL

## 2022-11-21 VITALS
OXYGEN SATURATION: 98 % | SYSTOLIC BLOOD PRESSURE: 158 MMHG | WEIGHT: 247.6 LBS | HEIGHT: 74 IN | HEART RATE: 95 BPM | DIASTOLIC BLOOD PRESSURE: 79 MMHG | TEMPERATURE: 97.9 F | BODY MASS INDEX: 31.78 KG/M2

## 2022-11-21 DIAGNOSIS — R22.1 NECK MASS: Primary | ICD-10-CM

## 2022-11-21 LAB — TSH SERPL DL<=0.005 MIU/L-ACNC: 1.27 UIU/ML (ref 0.3–4.2)

## 2022-11-21 PROCEDURE — 84480 ASSAY TRIIODOTHYRONINE (T3): CPT

## 2022-11-21 PROCEDURE — 90471 IMMUNIZATION ADMIN: CPT

## 2022-11-21 PROCEDURE — 99214 OFFICE O/P EST MOD 30 MIN: CPT | Mod: 25

## 2022-11-21 PROCEDURE — 84439 ASSAY OF FREE THYROXINE: CPT

## 2022-11-21 PROCEDURE — 84443 ASSAY THYROID STIM HORMONE: CPT

## 2022-11-21 PROCEDURE — 36415 COLL VENOUS BLD VENIPUNCTURE: CPT

## 2022-11-21 PROCEDURE — 90686 IIV4 VACC NO PRSV 0.5 ML IM: CPT

## 2022-11-21 NOTE — PROGRESS NOTES
Preceptor Attestation:   Patient seen, evaluated and discussed with the resident. I have verified the content of the note, which accurately reflects my assessment of the patient and the plan of care.   Supervising Physician:  Ganga Cee MD

## 2022-11-21 NOTE — PATIENT INSTRUCTIONS
Patient Education   Here is the plan from today's visit    1. Neck mass (thyroid nodule vs lymph node)  I will send you a Mobile[employer] message in the next day or so with the results of your tests today.  If you feel your heart racing even at rest, check your blood pressure and notice that it is much higher than usual, excessive sweating, visual changes, headaches, or feel dizzy for prolonged periods of time please give us a call back, a Mobile[employer] message, or schedule an appointment.  Most likely this will resolve in the next few days, but if it is not please schedule another follow-up appointment for further evaluation.  - TSH with free T4 reflex; Future  - TSH with free T4 reflex  - T3 total; Future  - T4 free; Future    Please call or return to clinic if your symptoms don't go away.    Follow up plan  Return in about 1 week (around 11/28/2022), or if symptoms worsen or fail to improve.    Thank you for coming to [facility] today.  Lab Testing:  **If you had lab testing today and your results are reassuring or normal they will be mailed to you or sent through [employer] within 7 days.   **If the lab tests need quick action we will call you with the results.  **If you are having labs done on a different day, please call [phone] to schedule at [facility] or [phone] for other [facility]patient Lab locations. Labs do not offer walk-in appointments.  The phone number we will call with results is # [phone] (home) [phone] (work). If this is not the best number please call our clinic and change the number.  Medication Refills:  If you need any refills please call your pharmacy and they will contact us.   If you need to  your refill at a new pharmacy, please contact the new pharmacy directly. The new pharmacy will help you get your medications transferred faster.   Scheduling:  If you have any concerns about today's visit or wish to schedule another appointment please call our office during  normal business hours [phone] (8-5:00 M-F)  If a referral was made to an [facility] specialty provider and you do not get a call from central scheduling, please refer to directions on your visit summary or call our office during normal business hours for assistance.   If a Mammogram was ordered for you at the Breast Center call [phone] to schedule or change your appointment.  If you had an XRay/CT/Ultrasound/MRI ordered the number is [phone] to schedule or change your radiology appointment.   [facility] has limited ultrasound appointments available on Wednesdays, if you would like your ultrasound at [facility], please call [phone] to schedule.   Medical Concerns:  If you have urgent medical concerns please call [phone] at any time of the day.    CHAD FARFAN, DO

## 2022-11-21 NOTE — PROGRESS NOTES
Assessment & Plan     Neck mass  Given the 3 to 4 cm mass present on the right anterior neck but nearly complete lack of significant review of systems, it is difficult to ascertain if this mass is a swollen thyroid nodule or a reactive lymph node.  At this time I believe it is reasonable to assess for thyrotoxicosis/infective thyroiditis with some simple TSH/T3/T4 and have the patient follow-up within the next week to assess if this mass has improved.  If the mass has not improved in the next week or so, I believe it is reasonable to order a full thyroid ultrasound and check for anti-TPO and antithyroglobulin.  If patient has worsening/quickening heart rate, would consider adding a short course of beta blockers for symptom control.   - TSH with free T4 reflex  - TSH with free T4 reflex  - T3 total  - T4 free         Return in about 1 week (around 11/28/2022), or if symptoms worsen or fail to improve.    CHAD FARFAN DO  [facility] SANDRA Leyva is a 42 year old, presenting for the following health issues:  Ear Problem (Pt reports having excessive nasal discharge and ear pain since last Tuesday. Pt also reports noticing swollen lymph node on the neck last Friday.)      HPI     Patient states that she has had an Intermittent ear-ache and post-nasal drip for a while but is most concerned about an anterior right neck pass which has been present on the right since Tuesday of last week.  Reports that postnasal discharge has been kind of thin but constant for the last few days.  Mentions that her work has been undergoing some construction and redoing floors, kicking up a lot of dust and dirt.  Otherwise has been overall feeling really good, health wise. No fevers, weight loss or weight changes, fatigue, chills, night sweats, no voice changes. No drainage or pain in the ear.  No heart palpitations, excessive sweating, headaches, dizziness, visual changes.  Patient did add that she has  "previously had a very significant lymph node reaction in her left axilla after receiving a COVID vaccine-the size of a golf ball that resolved in about 6 to 7 days.    Elevated BP - Pt had a BP reading of 158/79, but stated that she forgot to take her blood pressure medication this morning.    Review of Systems         Objective    BP (!) 158/79   Pulse 95   Temp 97.9  F (36.6  C) (Oral)   Ht 1.88 m (6' 2\")   Wt 112.3 kg (247 lb 9.6 oz)   SpO2 98%   BMI 31.79 kg/m    Body mass index is 31.79 kg/m .  Physical Exam  Vitals reviewed.   Constitutional:       General: She is not in acute distress.     Appearance: Normal appearance.   HENT:      Head: Normocephalic and atraumatic.      Right Ear: External ear normal.      Left Ear: External ear normal.      Nose: Nose normal. No rhinorrhea.      Mouth/Throat:      Mouth: Mucous membranes are moist.      Pharynx: Oropharynx is clear. No oropharyngeal exudate or posterior oropharyngeal erythema.   Eyes:      General:         Right eye: No discharge.         Left eye: No discharge.      Extraocular Movements: Extraocular movements intact.      Conjunctiva/sclera: Conjunctivae normal.      Pupils: Pupils are equal, round, and reactive to light.   Neck:     Cardiovascular:      Rate and Rhythm: Normal rate and regular rhythm.      Pulses: Normal pulses.      Heart sounds: Normal heart sounds. No murmur heard.    No gallop.   Pulmonary:      Effort: Pulmonary effort is normal. No respiratory distress.      Breath sounds: Normal breath sounds. No wheezing, rhonchi or rales.   Abdominal:      General: Abdomen is flat. Bowel sounds are normal. There is no distension.      Palpations: Abdomen is soft.      Tenderness: There is no abdominal tenderness. There is no guarding.   Musculoskeletal:         General: No deformity. Normal range of motion.      Cervical back: Normal range of motion.   Lymphadenopathy:      Cervical: Cervical adenopathy (right anterior mass - adenopathy " vs thyroid?) present.   Skin:     General: Skin is warm and dry.      Findings: No bruising, lesion or rash.   Neurological:      General: No focal deficit present.      Mental Status: She is alert and oriented to person, place, and time.   Psychiatric:         Mood and Affect: Mood normal.         Behavior: Behavior normal.         Thought Content: Thought content normal.         Judgment: Judgment normal.

## 2022-11-22 LAB
T3 SERPL-MCNC: 134 NG/DL (ref 85–202)
T4 FREE SERPL-MCNC: 1.37 NG/DL (ref 0.9–1.7)

## 2022-12-08 ENCOUNTER — OFFICE VISIT (OUTPATIENT)
Dept: FAMILY MEDICINE | Facility: CLINIC | Age: 42
End: 2022-12-08
Payer: COMMERCIAL

## 2022-12-08 VITALS
OXYGEN SATURATION: 99 % | TEMPERATURE: 98.7 F | RESPIRATION RATE: 19 BRPM | BODY MASS INDEX: 29.83 KG/M2 | WEIGHT: 245 LBS | HEIGHT: 76 IN

## 2022-12-08 DIAGNOSIS — R22.1 NECK MASS: Primary | ICD-10-CM

## 2022-12-08 PROCEDURE — 99213 OFFICE O/P EST LOW 20 MIN: CPT | Mod: GC

## 2022-12-08 NOTE — PROGRESS NOTES
"  Assessment & Plan     Neck mass  Given that this mass has not dramatically reduced in size and that it is still present a few weeks after initial evaluation, I would like to proceed with US of the neck. Will have the patient follow-up with our clinic following the results of the ultrasound. Will consider referral to ENT pending US results.  - US Head Neck Soft Tissue    Return for Follow up ultrasound, with me.    CHAD FARFAN DO [facility] SANDRA Leyva is a 42 year old, presenting for the following health issues:  RECHECK      HPI     Bump on neck still present from last visit but maybe less swollen. Doesn't hurt but is maybe a bit sore from touching/maniuplating it. Endorses continued concern regarding the fact that we don't know what it is yet.     Still has been waking up with drainage/phlegm that stays all day. No congestion, no cough, no fever, no rhinorrhea. Phlegm has been annoying but not overly concerning to the patient.    No fatigue, mood has been okay, no big weight changes.     Review of Systems   Constitutional, HEENT, cardiovascular, pulmonary, gi and gu systems are negative, except as otherwise noted.      Objective    Temp 98.7  F (37.1  C)   Resp 19   Ht 1.93 m (6' 4\")   Wt 111.1 kg (245 lb)   SpO2 99%   BMI 29.82 kg/m    Body mass index is 29.82 kg/m .  Physical Exam  Vitals reviewed.   Constitutional:       General: She is not in acute distress.     Appearance: Normal appearance.   HENT:      Head: Normocephalic and atraumatic.      Right Ear: External ear normal.      Left Ear: External ear normal.      Nose: Nose normal. No rhinorrhea.      Mouth/Throat:      Mouth: Mucous membranes are moist.      Pharynx: Oropharynx is clear. No oropharyngeal exudate or posterior oropharyngeal erythema.   Eyes:      General:         Right eye: No discharge.         Left eye: No discharge.      Extraocular Movements: Extraocular movements intact.      " Conjunctiva/sclera: Conjunctivae normal.      Pupils: Pupils are equal, round, and reactive to light.   Neck:     Cardiovascular:      Rate and Rhythm: Normal rate and regular rhythm.      Pulses: Normal pulses.      Heart sounds: Normal heart sounds. No murmur heard.    No gallop.   Pulmonary:      Effort: Pulmonary effort is normal. No respiratory distress.      Breath sounds: Normal breath sounds. No wheezing, rhonchi or rales.   Abdominal:      General: Abdomen is flat. Bowel sounds are normal. There is no distension.      Palpations: Abdomen is soft.      Tenderness: There is no abdominal tenderness. There is no guarding.   Musculoskeletal:         General: No deformity. Normal range of motion.      Cervical back: Normal range of motion.   Lymphadenopathy:      Cervical: Cervical adenopathy (right anterior mass) present.   Skin:     General: Skin is warm and dry.      Findings: No bruising, lesion or rash.   Neurological:      General: No focal deficit present.      Mental Status: She is alert and oriented to person, place, and time.   Psychiatric:         Mood and Affect: Mood normal.         Behavior: Behavior normal.         Thought Content: Thought content normal.         Judgment: Judgment normal.

## 2022-12-08 NOTE — PROGRESS NOTES
Preceptor Attestation:    I discussed the patient with the resident and evaluated the patient in person. I have verified the content of the note, which accurately reflects my assessment of the patient and the plan of care. Examined lesion on neck.    Supervising Physician:  Steven Mcmahan MD, MD.

## 2022-12-08 NOTE — PATIENT INSTRUCTIONS
Patient Education   Here is the plan from today's visit    1. Neck mass  I placed a referral for you to get this ultrasound completed, but if you don't hear from them by the end of the day tomorrow (12/9/22) please call [phone] to get the appointment scheduled. I would like you to make a follow-up appointment with me for after you get this ultrasound completed and we can discuss the results and next steps.  - US Head Neck Soft Tissue; Future    Please call or return to clinic if your symptoms don't go away.    Follow up plan  Return for Follow up ultrasound, with me.    Thank you for coming to [facility] today.  Lab Testing:  **If you had lab testing today and your results are reassuring or normal they will be mailed to you or sent through [employer] within 7 days.   **If the lab tests need quick action we will call you with the results.  **If you are having labs done on a different day, please call [phone] to schedule at [facility] or [phone] for other [facility] Outpatient Lab locations. Labs do not offer walk-in appointments.  The phone number we will call with results is # [phone] (home) [phone] (work). If this is not the best number please call our clinic and change the number.  Medication Refills:  If you need any refills please call your pharmacy and they will contact us.   If you need to  your refill at a new pharmacy, please contact the new pharmacy directly. The new pharmacy will help you get your medications transferred faster.   Scheduling:  If you have any concerns about today's visit or wish to schedule another appointment please call our office during normal business hours [phone] (8-5:00 M-F). If you can no longer make a scheduled visit, please cancel via [employer] or call us to cancel.   If a referral was made to an [facility] specialty provider and you do not get a call from central scheduling, please refer to directions on your visit summary or  call our office during normal business hours for assistance.   If a Mammogram was ordered for you at the Breast Center call [phone] to schedule or change your appointment.  If you had an XRay/CT/Ultrasound/MRI ordered the number is [phone] to schedule or change your radiology appointment.   [facility] has limited ultrasound appointments available on Wednesdays, if you would like your ultrasound at [facility], please call [phone] to schedule.   Medical Concerns:  If you have urgent medical concerns please call [phone] at any time of the day.    CHAD FARFAN, DO

## 2022-12-13 ENCOUNTER — HOSPITAL ENCOUNTER (OUTPATIENT)
Dept: ULTRASOUND IMAGING | Facility: CLINIC | Age: 42
Discharge: HOME | End: 2022-12-13
Payer: COMMERCIAL

## 2022-12-13 DIAGNOSIS — R22.1 NECK MASS: ICD-10-CM

## 2022-12-13 PROCEDURE — 76536 US EXAM OF HEAD AND NECK: CPT

## 2022-12-14 DIAGNOSIS — R22.1 NECK MASS: Primary | ICD-10-CM

## 2022-12-21 DIAGNOSIS — F64.0 GENDER DYSPHORIA IN ADULT: Primary | ICD-10-CM

## 2022-12-21 RX ORDER — NEEDLES, DISPOSABLE 25GX5/8"
NEEDLE, DISPOSABLE MISCELLANEOUS
Qty: 25 EACH | Refills: 3 | Status: SHIPPED | OUTPATIENT
Start: 2022-12-21 | End: 2023-04-12

## 2022-12-21 RX ORDER — ESTRADIOL VALERATE 20 MG/ML
INJECTION INTRAMUSCULAR
Qty: 5 ML | Refills: 0 | Status: SHIPPED | OUTPATIENT
Start: 2022-12-21 | End: 2023-04-12

## 2022-12-21 NOTE — TELEPHONE ENCOUNTER
"Last seen 12/8/2022    Request for medication refill:  estradiol valerate (DELESTROGEN) 20 MG/ML injection  Providers if patient needs an appointment and you are willing to give a one month supply please refill for one month and  send a letter/MyChart using \".SMILLIMITEDREFILL\" .smillimited and route chart to \"P SMI \" (Giving one month refill in non controlled medications is strongly recommended before denial)    If refill has been denied, meaning absolutely no refills without visit, please complete the smart phrase \".smirxrefuse\" and route it to the \"P SMI MED REFILLS\"  pool to inform the patient and the pharmacy.    Rosey Bowen RN        "

## 2022-12-21 NOTE — TELEPHONE ENCOUNTER
"Last seen 11/11/2022    Request for medication refill:  BD HYPODERMIC NEEDLE 18G X 1\" MISC  Providers if patient needs an appointment and you are willing to give a one month supply please refill for one month and  send a letter/MyChart using \".SMILLIMITEDREFILL\" .smillimited and route chart to \"P SMI \" (Giving one month refill in non controlled medications is strongly recommended before denial)    If refill has been denied, meaning absolutely no refills without visit, please complete the smart phrase \".smirxrefuse\" and route it to the \"P SMI MED REFILLS\"  pool to inform the patient and the pharmacy.    Rosey Bowen RN        "

## 2022-12-22 ENCOUNTER — HOSPITAL ENCOUNTER (OUTPATIENT)
Dept: GENERAL RADIOLOGY | Facility: CLINIC | Age: 42
Discharge: HOME | End: 2022-12-22
Payer: COMMERCIAL

## 2022-12-22 ENCOUNTER — HOSPITAL ENCOUNTER (OUTPATIENT)
Dept: CT IMAGING | Facility: CLINIC | Age: 42
Discharge: HOME | End: 2022-12-22
Payer: COMMERCIAL

## 2022-12-22 ENCOUNTER — HOSPITAL ENCOUNTER (OUTPATIENT)
Dept: ULTRASOUND IMAGING | Facility: CLINIC | Age: 42
Discharge: HOME | End: 2022-12-22
Payer: COMMERCIAL

## 2022-12-22 DIAGNOSIS — R22.1 NECK MASS: ICD-10-CM

## 2022-12-22 PROCEDURE — 71046 X-RAY EXAM CHEST 2 VIEWS: CPT

## 2022-12-22 PROCEDURE — 88173 CYTOPATH EVAL FNA REPORT: CPT | Mod: 26 | Performed by: PATHOLOGY

## 2022-12-22 PROCEDURE — 250N000011 HC RX IP 250 OP 636: Performed by: RADIOLOGY

## 2022-12-22 PROCEDURE — 250N000009 HC RX 250: Performed by: RADIOLOGY

## 2022-12-22 PROCEDURE — 70491 CT SOFT TISSUE NECK W/DYE: CPT

## 2022-12-22 PROCEDURE — 10006 FNA BX W/US GDN EA ADDL: CPT

## 2022-12-22 PROCEDURE — 88173 CYTOPATH EVAL FNA REPORT: CPT | Mod: TC

## 2022-12-22 PROCEDURE — 10005 FNA BX W/US GDN 1ST LES: CPT

## 2022-12-22 RX ORDER — IOPAMIDOL 755 MG/ML
500 INJECTION, SOLUTION INTRAVASCULAR ONCE
Status: COMPLETED | OUTPATIENT
Start: 2022-12-22 | End: 2022-12-22

## 2022-12-22 RX ADMIN — SODIUM CHLORIDE 65 ML: 9 INJECTION, SOLUTION INTRAVENOUS at 08:41

## 2022-12-22 RX ADMIN — LIDOCAINE HYDROCHLORIDE 10 ML: 10 INJECTION, SOLUTION EPIDURAL; INFILTRATION; INTRACAUDAL; PERINEURAL at 09:04

## 2022-12-22 RX ADMIN — IOPAMIDOL 90 ML: 755 INJECTION, SOLUTION INTRAVENOUS at 08:40

## 2022-12-22 NOTE — PROGRESS NOTES
Patient tolerated right thyroid nodule biopsy well by Dr. Aguiar.  Bandage clean, dry and intact at time of discharge.  Patient states understanding of discharge instructions and left department in satisfactory condition.

## 2022-12-28 ENCOUNTER — OFFICE VISIT (OUTPATIENT)
Dept: FAMILY MEDICINE | Facility: CLINIC | Age: 42
End: 2022-12-28
Payer: COMMERCIAL

## 2022-12-28 VITALS
RESPIRATION RATE: 16 BRPM | DIASTOLIC BLOOD PRESSURE: 81 MMHG | HEART RATE: 102 BPM | WEIGHT: 242.6 LBS | SYSTOLIC BLOOD PRESSURE: 135 MMHG | OXYGEN SATURATION: 96 % | BODY MASS INDEX: 29.53 KG/M2 | TEMPERATURE: 98.2 F

## 2022-12-28 DIAGNOSIS — C73 PAPILLARY THYROID CARCINOMA (H): Primary | ICD-10-CM

## 2022-12-28 DIAGNOSIS — Z23 HIGH PRIORITY FOR 2019-NCOV VACCINE: ICD-10-CM

## 2022-12-28 PROCEDURE — 99214 OFFICE O/P EST MOD 30 MIN: CPT | Mod: 25

## 2022-12-28 PROCEDURE — 91312 COVID-19 VACCINE BIVALENT BOOSTER 12+ (PFIZER): CPT

## 2022-12-28 PROCEDURE — 0124A COVID-19 VACCINE BIVALENT BOOSTER 12+ (PFIZER): CPT

## 2022-12-28 NOTE — TELEPHONE ENCOUNTER
FUTURE VISIT INFORMATION      FUTURE VISIT INFORMATION:    Date: 1/13/23    Time: 1:20 PM    Location: [facility]  REFERRAL INFORMATION:    Referring provider:  Candelaria Silva MD    Referring providers clinic:  [facility]    Reason for visit/diagnosis  Per pt/Papillary thyroid carcinoma/ med rec in [facility]/ Ref Candelaria Silva MD in [facility]    RECORDS REQUESTED FROM:       Clinic name Comments Records Status Imaging Status   [facility] 12/28/22 OV with Joby Aldana DO/ Candelaria Silva MD [facility]    Imaging US thyriod 12/13/22  CT soft neck 12/22/22  US biopsy FNA 12/22/22 [facility]   [address]   1st floor, Room [address]   [facility] [address]  Phone [phone] 12/22/22 Thyroid, Right FNA [id number]    *Tracking [id number] [facility] ENT 8/20/15 OV with Solomon Morales MD Care Everywhere                  1/9/23 6:36am sent request for path -Valerie  1/12/23 12:56pm called [facility] for status on path request sent on 1/9/23. Per La, look like the path is at [facility] and is not yet in process. She will call [facility] for path sent out today -Valerie

## 2022-12-28 NOTE — PATIENT INSTRUCTIONS
Patient Education   Here is the plan from today's visit    1. Papillary thyroid carcinoma (H)  - Adult ENT  Referral; Future    If you have any questions at all please feel free to reach out to me via [employer], I will be happy to answer any questions you might have.  This point Next steps will be to have you follow-up with ENT (ear nose throat) to discuss the next steps in management.    Please call or return to clinic if your symptoms don't go away.    Follow up plan  Return for appt with me once you have your ENT appointment scheduled.    Thank you for coming to [facility] today.  Lab Testing:  **If you had lab testing today and your results are reassuring or normal they will be mailed to you or sent through [employer] within 7 days.   **If the lab tests need quick action we will call you with the results.  **If you are having labs done on a different day, please call [phone] to schedule at [facility] or [phone] for other [facility] Outpatient Lab locations. Labs do not offer walk-in appointments.  The phone number we will call with results is # [phone] (home) [phone] (work). If this is not the best number please call our clinic and change the number.  Medication Refills:  If you need any refills please call your pharmacy and they will contact us.   If you need to  your refill at a new pharmacy, please contact the new pharmacy directly. The new pharmacy will help you get your medications transferred faster.   Scheduling:  If you have any concerns about today's visit or wish to schedule another appointment please call our office during normal business hours [phone] (8-5:00 M-F). If you can no longer make a scheduled visit, please cancel via [employer]ially or call us to cancel.   If a referral was made to an [facility] specialty provider and you do not get a call from central scheduling, please refer to directions on your visit summary or call our office during normal  business hours for assistance.   If a Mammogram was ordered for you at the Breast Center call [phone] to schedule or change your appointment.  If you had an XRay/CT/Ultrasound/MRI ordered the number is [phone] to schedule or change your radiology appointment.   [facility] has limited ultrasound appointments available on Wednesdays, if you would like your ultrasound at [facility], please call [phone] to schedule.   Medical Concerns:  If you have urgent medical concerns please call [phone] at any time of the day.    CHAD FARFAN, DO

## 2022-12-28 NOTE — PROGRESS NOTES
Assessment & Plan     Papillary thyroid carcinoma (H)  Gordon has had a mass on her neck for the past couple months and was worked up with ultrasound, CT soft tissue neck, CXR, FNA. FNA pathology came back positive for papillary thyroid carcinoma about a day and a half ago.  I placed a priority ENT referral this afternoon and will have her follow-up with them regarding next steps.  Provided her with some information regarding the prognosis of this diagnosis and the potential next steps.  Offered additional informational resources but patient declined.  Overall conversation went well and she is ready to proceed to the next treatment.  We will reach out to the ENT provider once she is scheduled to determine if referral to oncology from primary care is indicated.  - Adult ENT  Referral    Return for appt with me once you have your ENT appointment scheduled.    CHAD FARFAN DO  [facility] SANDRA Leyva is a 42 year old, presenting for the following health issues:  RECHECK (Pt is here for tyroid cancer. ) and Imm/Inj (COVID-19 VACCINE)    HPI     Thyroid cancer -already saw results on MyChart when they were released Monday evening.  Overall is feeling okay given the bad news.  States that she is certainly feeling stressed, has some questions regarding prognosis and next steps.    Is okay with getting her COVID booster today.    Review of Systems   Constitutional, HEENT, cardiovascular, pulmonary, gi and gu systems are negative, except as otherwise noted.      Objective    /81   Pulse 102   Temp 98.2  F (36.8  C) (Oral)   Resp 16   Wt 110 kg (242 lb 9.6 oz)   SpO2 96%   BMI 29.53 kg/m    Body mass index is 29.53 kg/m .  Physical Exam  Vitals reviewed.   Constitutional:       General: She is not in acute distress.     Appearance: Normal appearance.   HENT:      Head: Normocephalic and atraumatic.      Right Ear: External ear normal.      Left Ear: External ear  normal.      Nose: Nose normal. No rhinorrhea.      Mouth/Throat:      Mouth: Mucous membranes are moist.      Pharynx: Oropharynx is clear. No oropharyngeal exudate or posterior oropharyngeal erythema.   Eyes:      General:         Right eye: No discharge.         Left eye: No discharge.      Extraocular Movements: Extraocular movements intact.      Conjunctiva/sclera: Conjunctivae normal.      Pupils: Pupils are equal, round, and reactive to light.   Neck:     Cardiovascular:      Rate and Rhythm: Normal rate and regular rhythm.      Pulses: Normal pulses.      Heart sounds: Normal heart sounds. No murmur heard.    No gallop.   Pulmonary:      Effort: Pulmonary effort is normal. No respiratory distress.      Breath sounds: Normal breath sounds. No wheezing, rhonchi or rales.   Abdominal:      General: Abdomen is flat. Bowel sounds are normal. There is no distension.      Palpations: Abdomen is soft.      Tenderness: There is no abdominal tenderness. There is no guarding.   Musculoskeletal:         General: No deformity. Normal range of motion.      Cervical back: Normal range of motion.   Lymphadenopathy:      Cervical: Cervical adenopathy (right anterior mass) present.   Skin:     General: Skin is warm and dry.      Findings: No bruising, lesion or rash.   Neurological:      General: No focal deficit present.      Mental Status: She is alert and oriented to person, place, and time.   Psychiatric:         Mood and Affect: Mood normal.         Behavior: Behavior normal.         Thought Content: Thought content normal.         Judgment: Judgment normal.

## 2023-01-12 NOTE — PROGRESS NOTES
"Dear Dr Aldana:    I had the pleasure of meeting Gordon Stack in consultation today at the [facility] at your request.     History of Present Illness:   Gordon Stack is a 42 year old referred for evaluation of papillary thyroid cancer.    11/21/2022: Visit with PCP with week history of right anterior neck mass    12/8/2022: Follow-up with PCP with persistent neck mass, recommended for U/S     12/13/2022: U/S thyroid showed 5.2 x 4.6 x 4 cm right inferior thyroid nodule TIRADS 4, 0.7 x 0.8 x 0.5 cm left superior thyroid nodule TIRADS 4, 1 x 0.9 x 0.8 cm left superior thyroid nodule TIRADS 3.    12/22/2022: CT neck showing 4.8 x 4.1 x 5.4 cm right thyroid nodule, no lymphadenopathy     12/22/2022: U/S guided FNA right thyroid nodule. Pathology consistent with papillary thyroid cancer.     12/28/2022: follow-up with PCP, referred to ENT      Denies any problems with eating or drinking. No problems with breathing. Denies constrictive symptoms, can lay flat at night.     Not a montalvo.     Does not think received HPV vaccine.      Past medical history: hypertension, hyperlipidemia, gender dysphoria    Past surgical history: Denies    Social history: Former smoker, 1/2-1 ppd, age 17 to age 41. No chewing tobacco use. Drinks couple drinks per week. No vaping or other drug use. . Lives alone.     Family history: Negative for thyroid cancer.     MEDICATIONS:     Current Outpatient Medications   Medication Sig Dispense Refill     B-D HYPODERMIC NEEDLE 23G X 1\" MISC Use for estradiol weekly injections 25 each 3     B-D SYRINGE LUER-EUSEBIO 1 ML MISC Use for weekly injections 25 each 1     BD DISP NEEDLES 18G X 1-1/2\" MISC USE FOR WEEKLY ESTRADIOL INJECTIONS. 25 each 3     BD HYPODERMIC NEEDLE 18G X 1\" MISC Use for weekly estradiol injections 25 each 3     estradiol valerate (DELESTROGEN) 20 MG/ML injection INJECT 1 ML (20MG) INTO THE MUSLE EVERY 28 DAYS 5 mL 0     estradiol " valerate (DELESTROGEN) 20 MG/ML injection Inject 1 mL (20 mg) into the muscle every 28 days 1 mL 3     estradiol valerate (DELESTROGEN) 20 MG/ML injection INJECT 0.2ML INTRAMUSCULARLY ONCE A WEEK. DISCARD VIAL 28 DAYS AFTER FIRST USE 5 mL 0     fluticasone (FLONASE) 50 MCG/ACT nasal spray SPRAY 1 SPRAY INTO BOTH NOSTRILS DAILY 15.8 mL 1     lisinopril (ZESTRIL) 10 MG tablet Take 1 tablet (10 mg) by mouth daily Please schedule your annual check up for November 2021 90 tablet 1     spironolactone (ALDACTONE) 100 MG tablet Take 1 tablet (100 mg) by mouth daily 90 tablet 3       ALLERGIES:  No Known Allergies    HABITS/SOCIAL HISTORY:    Former smoker, 1/2-1 ppd, age 17 to age 41. No chewing tobacco use. Drinks couple drinks per week. No vaping or other drug use.   .   Lives alone.       Social History     Socioeconomic History     Marital status: Single     Spouse name: Not on file     Number of children: Not on file     Years of education: Not on file     Highest education level: Not on file   Occupational History     Not on file   Tobacco Use     Smoking status: Some Days     Smokeless tobacco: Never   Vaping Use     Vaping Use: Never used   Substance and Sexual Activity     Alcohol use: Yes     Comment: occ     Drug use: No     Sexual activity: Not Currently   Other Topics Concern     Not on file   Social History Narrative     Not on file     Social Determinants of Health     Financial Resource Strain: Not on file   Food Insecurity: Not on file   Transportation Needs: Not on file   Physical Activity: Not on file   Stress: Not on file   Social Connections: Not on file   Intimate Partner Violence: Not on file   Housing Stability: Not on file       PAST MEDICAL HISTORY:   Past Medical History:   Diagnosis Date     Allergic state         PAST SURGICAL HISTORY: No past surgical history on file.    FAMILY HISTORY:    Family History   Problem Relation Age of Onset     Asthma Brother        REVIEW OF SYSTEMS:   "12 point ROS was negative other than the symptoms noted above in the HPI.  Patient Supplied Answers to Review of Systems  [facility] ROS 1/13/2023   Allergy/Immunology: Allergies or hay fever         PHYSICAL EXAMINATION:   /79 (BP Location: Right arm, Patient Position: Sitting, Cuff Size: Adult Large)   Pulse 99   Ht 1.854 m (6' 1\")   Wt 109.3 kg (241 lb)   SpO2 98%   BMI 31.80 kg/m     Appearance:   normal; NAD, age-appropriate appearance, well-developed, normal habitus   Communication:   normal; communicates verbally, normal voice quality   Head/Face:   inspection -  Normal; no scars or visible lesions   Skin:  normal, no rash   Ears:  auricle (AD) -  normal  EAC (AD) -  normal  TM (AD) -  Normal, no effusion  auricle (AS) -  normal  EAC (AS) -  normal  TM (AS) -  Normal, no effusion  Normal clinical speech reception   Nose:  Ext. inspection -  Normal  Internal Inspection -  Normal mucosa, septum, and turbinates   Nasopharynx:  normal mucosa, no masses   Oral Cavity:  lips -  Normal mucosa, oral competence, and stoma size   Age-appropriate dentition, healthy gingival mucosa   Hard palate, buccal, floor of mouth mucosa normal   Tongue - normal movement, no lesions   Oropharynx:  mucosa -  Normal, no lesions  soft palate -  Normal, no lesions, no asymmetry, normal elevation  tonsils -  Normal, no exudates, no abnormal lesions, symmetric  Base of tongue - normal  Vallecula - clear   Hypopharynx:  Normal pyriform sinus and pharyngeal wall mucosa   No pooled secretions    Larynx:  Epiglottis, AE folds, false vocal cords, true vocal cords, arytenoids normal in appearance, bilaterally mobile cords    Neck: No visible mass or asymmetry   thyroid -about 5 cm palpable right thyroid nodule, nontender  Normal range of motion   Lymphatic:  no abnormal nodes   Cardiovascular:  warm, pink, well-perfused extremities without swelling, tenderness, or edema   Respiratory:  Normal respiratory effort, no stridor "   Neuro/Psych.:  mood/affect -  normal  mental status -  normal     PROCEDURES:   Flexible fiberoptic laryngoscopy: Scope exam was indicated due to thyroid cancer. Verbal consent was obtained. The nasal cavity was prepped with an aerosolized solution of topical anesthetic and vasoconstrictive agent. The scope was passed through the anterior nasal cavity and advanced. Inspection of the nasopharynx revealed no gross abnormality. The base of tongue and vallecula are normal. The epiglottis, AE folds, false cords, true cords, arytenoids are normal. Inspection of the larynx revealed bilaterally mobile vocal cords. Pyriform sinuses are symmetric. The airway is patent. Procedure tolerated well with no immediate complications noted.    RESULTS REVIEWED:   I reviewed note from PCP x 3, U/S thyroid report, CT neck report, path report    I independently reviewed the CT neck images    IMPRESSION AND PLAN:   Gordon Stack is a 42 year old with papillary thyroid carcinoma.    Given the bilateral nodules in the thyroid, including a left TIRADS 4 nodule I would recommend total thyroidectomy.    We discussed the total thyroidectomy in detail including the risks of the procedure.  I explained the planned incision in the neck.  We discussed the risks to the recurrent laryngeal nerves.  I explained to her that both nerves are risk due to the plan of the total thyroidectomy.  We reviewed the fact that injury to one nerve can result in dysphagia, dysphonia but bilateral injury would result in airway emergency requiring a tracheostomy.  We discussed the low risk of bilateral injury.  We reviewed the fact that the parathyroid glands are at risk with the procedure.  I explained to her that she would be placed on calcium replacement as well as potentially vitamin D following the surgery with monitoring of her labs.  I discussed that some patients experience some dysphagia following the procedure related to manipulation of the strap muscles  and the area proximity to the esophagus.  I would anticipate these would be temporary.  She would need to be on long-term thyroid replacement hormone following the surgery.     We also reviewed general surgical risks including bleeding, infection, scarring.       I explained to her that she may require postoperative radioactive iodine pending the final pathology.  This would be determined by our endocrinology colleagues.    I would like to get a dedicated neck U/S prior to surgery just to ensure no need for neck dissection, though CT neck was negative.     She will have a preop physical done by her primary care physician.  We will work on finding a surgical date that works in her schedule.    We did also discuss the HPV vaccine, meets FDA requirements by age, encouraged to discuss with PCP.    Thank you very much for the opportunity to participate in the care of your patient.      Prema Krishnamurthy MD  Otolaryngology- Head & Neck Surgery      This note was dictated with voice recognition software and then edited. Please excuse any unintentional errors.         CC:  Candelaria Silva MD  [address] [address]      Joby Aldana DO  [address] [place] [id number]

## 2023-01-13 ENCOUNTER — OFFICE VISIT (OUTPATIENT)
Dept: OTOLARYNGOLOGY | Facility: CLINIC | Age: 43
End: 2023-01-13
Attending: FAMILY MEDICINE
Payer: COMMERCIAL

## 2023-01-13 ENCOUNTER — PRE VISIT (OUTPATIENT)
Dept: OTOLARYNGOLOGY | Facility: CLINIC | Age: 43
End: 2023-01-13

## 2023-01-13 VITALS
DIASTOLIC BLOOD PRESSURE: 79 MMHG | SYSTOLIC BLOOD PRESSURE: 130 MMHG | OXYGEN SATURATION: 98 % | HEART RATE: 99 BPM | HEIGHT: 73 IN | WEIGHT: 241 LBS | BODY MASS INDEX: 31.94 KG/M2

## 2023-01-13 DIAGNOSIS — C73 PAPILLARY THYROID CARCINOMA (H): Primary | ICD-10-CM

## 2023-01-13 PROCEDURE — 31575 DIAGNOSTIC LARYNGOSCOPY: CPT | Performed by: OTOLARYNGOLOGY

## 2023-01-13 PROCEDURE — 99205 OFFICE O/P NEW HI 60 MIN: CPT | Mod: 25 | Performed by: OTOLARYNGOLOGY

## 2023-01-13 ASSESSMENT — PAIN SCALES - GENERAL: PAINLEVEL: NO PAIN (0)

## 2023-01-13 NOTE — TELEPHONE ENCOUNTER
Action     Action Taken January 13, 2023 11:35 AM  [id number]     Pathology received from [facility] and sent to 5th floor path lab to be reviewed with filled out pathology consult form.    Path: 12/22/22 Thyroid, Right FNA [id number]

## 2023-01-13 NOTE — LETTER
"1/13/2023       RE: Gordon Stack  [address]  [facility] [address]     Dear Colleague,    Thank you for referring your patient, Gordon Stack, to the [facility] [place] at [facility]. Please see a copy of my visit note below.    Dear Dr Aldana:    I had the pleasure of meeting Gordon Stack in consultation today at the [facility] at your request.     History of Present Illness:   Gordon Stack is a 42 year old referred for evaluation of papillary thyroid cancer.    11/21/2022: Visit with PCP with week history of right anterior neck mass    12/8/2022: Follow-up with PCP with persistent neck mass, recommended for U/S     12/13/2022: U/S thyroid showed 5.2 x 4.6 x 4 cm right inferior thyroid nodule TIRADS 4, 0.7 x 0.8 x 0.5 cm left superior thyroid nodule TIRADS 4, 1 x 0.9 x 0.8 cm left superior thyroid nodule TIRADS 3.    12/22/2022: CT neck showing 4.8 x 4.1 x 5.4 cm right thyroid nodule, no lymphadenopathy     12/22/2022: U/S guided FNA right thyroid nodule. Pathology consistent with papillary thyroid cancer.     12/28/2022: follow-up with PCP, referred to ENT      Denies any problems with eating or drinking. No problems with breathing. Denies constrictive symptoms, can lay flat at night.     Not a montalvo.     Does not think received HPV vaccine.      Past medical history: hypertension, hyperlipidemia, gender dysphoria    Past surgical history: Denies    Social history: Former smoker, 1/2-1 ppd, age 17 to age 41. No chewing tobacco use. Drinks couple drinks per week. No vaping or other drug use. . Lives alone.     Family history: Negative for thyroid cancer.     MEDICATIONS:     Current Outpatient Medications   Medication Sig Dispense Refill     B-D HYPODERMIC NEEDLE 23G X 1\" MISC Use for estradiol weekly injections 25 each 3     B-D SYRINGE LUER-EUSEBIO 1 ML MISC " "Use for weekly injections 25 each 1     BD DISP NEEDLES 18G X 1-1/2\" MISC USE FOR WEEKLY ESTRADIOL INJECTIONS. 25 each 3     BD HYPODERMIC NEEDLE 18G X 1\" MISC Use for weekly estradiol injections 25 each 3     estradiol valerate (DELESTROGEN) 20 MG/ML injection INJECT 1 ML (20MG) INTO THE MUSLE EVERY 28 DAYS 5 mL 0     estradiol valerate (DELESTROGEN) 20 MG/ML injection Inject 1 mL (20 mg) into the muscle every 28 days 1 mL 3     estradiol valerate (DELESTROGEN) 20 MG/ML injection INJECT 0.2ML INTRAMUSCULARLY ONCE A WEEK. DISCARD VIAL 28 DAYS AFTER FIRST USE 5 mL 0     fluticasone (FLONASE) 50 MCG/ACT nasal spray SPRAY 1 SPRAY INTO BOTH NOSTRILS DAILY 15.8 mL 1     lisinopril (ZESTRIL) 10 MG tablet Take 1 tablet (10 mg) by mouth daily Please schedule your annual check up for November 2021 90 tablet 1     spironolactone (ALDACTONE) 100 MG tablet Take 1 tablet (100 mg) by mouth daily 90 tablet 3       ALLERGIES:  No Known Allergies    HABITS/SOCIAL HISTORY:    Former smoker, 1/2-1 ppd, age 17 to age 41. No chewing tobacco use. Drinks couple drinks per week. No vaping or other drug use.   .   Lives alone.       Social History     Socioeconomic History     Marital status: Single     Spouse name: Not on file     Number of children: Not on file     Years of education: Not on file     Highest education level: Not on file   Occupational History     Not on file   Tobacco Use     Smoking status: Some Days     Smokeless tobacco: Never   Vaping Use     Vaping Use: Never used   Substance and Sexual Activity     Alcohol use: Yes     Comment: occ     Drug use: No     Sexual activity: Not Currently   Other Topics Concern     Not on file   Social History Narrative     Not on file     Social Determinants of Health     Financial Resource Strain: Not on file   Food Insecurity: Not on file   Transportation Needs: Not on file   Physical Activity: Not on file   Stress: Not on file   Social Connections: Not on file   Intimate " "Partner Violence: Not on file   Housing Stability: Not on file       PAST MEDICAL HISTORY:   Past Medical History:   Diagnosis Date     Allergic state         PAST SURGICAL HISTORY: No past surgical history on file.    FAMILY HISTORY:    Family History   Problem Relation Age of Onset     Asthma Brother        REVIEW OF SYSTEMS:  12 point ROS was negative other than the symptoms noted above in the HPI.  Patient Supplied Answers to Review of Systems   ENT ROS 1/13/2023   Allergy/Immunology: Allergies or hay fever         PHYSICAL EXAMINATION:   /79 (BP Location: Right arm, Patient Position: Sitting, Cuff Size: Adult Large)   Pulse 99   Ht 1.854 m (6' 1\")   Wt 109.3 kg (241 lb)   SpO2 98%   BMI 31.80 kg/m     Appearance:   normal; NAD, age-appropriate appearance, well-developed, normal habitus   Communication:   normal; communicates verbally, normal voice quality   Head/Face:   inspection -  Normal; no scars or visible lesions   Skin:  normal, no rash   Ears:  auricle (AD) -  normal  EAC (AD) -  normal  TM (AD) -  Normal, no effusion  auricle (AS) -  normal  EAC (AS) -  normal  TM (AS) -  Normal, no effusion  Normal clinical speech reception   Nose:  Ext. inspection -  Normal  Internal Inspection -  Normal mucosa, septum, and turbinates   Nasopharynx:  normal mucosa, no masses   Oral Cavity:  lips -  Normal mucosa, oral competence, and stoma size   Age-appropriate dentition, healthy gingival mucosa   Hard palate, buccal, floor of mouth mucosa normal   Tongue - normal movement, no lesions   Oropharynx:  mucosa -  Normal, no lesions  soft palate -  Normal, no lesions, no asymmetry, normal elevation  tonsils -  Normal, no exudates, no abnormal lesions, symmetric  Base of tongue - normal  Vallecula - clear   Hypopharynx:  Normal pyriform sinus and pharyngeal wall mucosa   No pooled secretions    Larynx:  Epiglottis, AE folds, false vocal cords, true vocal cords, arytenoids normal in appearance, bilaterally " mobile cords    Neck: No visible mass or asymmetry   thyroid -about 5 cm palpable right thyroid nodule, nontender  Normal range of motion   Lymphatic:  no abnormal nodes   Cardiovascular:  warm, pink, well-perfused extremities without swelling, tenderness, or edema   Respiratory:  Normal respiratory effort, no stridor   Neuro/Psych.:  mood/affect -  normal  mental status -  normal     PROCEDURES:   Flexible fiberoptic laryngoscopy: Scope exam was indicated due to thyroid cancer. Verbal consent was obtained. The nasal cavity was prepped with an aerosolized solution of topical anesthetic and vasoconstrictive agent. The scope was passed through the anterior nasal cavity and advanced. Inspection of the nasopharynx revealed no gross abnormality. The base of tongue and vallecula are normal. The epiglottis, AE folds, false cords, true cords, arytenoids are normal. Inspection of the larynx revealed bilaterally mobile vocal cords. Pyriform sinuses are symmetric. The airway is patent. Procedure tolerated well with no immediate complications noted.    RESULTS REVIEWED:   I reviewed note from PCP x 3, U/S thyroid report, CT neck report, path report    I independently reviewed the CT neck images    IMPRESSION AND PLAN:   Gordon Stack is a 42 year old with papillary thyroid carcinoma.    Given the bilateral nodules in the thyroid, including a left TIRADS 4 nodule I would recommend total thyroidectomy.    We discussed the total thyroidectomy in detail including the risks of the procedure.  I explained the planned incision in the neck.  We discussed the risks to the recurrent laryngeal nerves.  I explained to her that both nerves are risk due to the plan of the total thyroidectomy.  We reviewed the fact that injury to one nerve can result in dysphagia, dysphonia but bilateral injury would result in airway emergency requiring a tracheostomy.  We discussed the low risk of bilateral injury.  We reviewed the fact that the parathyroid  glands are at risk with the procedure.  I explained to her that she would be placed on calcium replacement as well as potentially vitamin D following the surgery with monitoring of her labs.  I discussed that some patients experience some dysphagia following the procedure related to manipulation of the strap muscles and the area proximity to the esophagus.  I would anticipate these would be temporary.  She would need to be on long-term thyroid replacement hormone following the surgery.     We also reviewed general surgical risks including bleeding, infection, scarring.       I explained to her that she may require postoperative radioactive iodine pending the final pathology.  This would be determined by our endocrinology colleagues.    I would like to get a dedicated neck U/S prior to surgery just to ensure no need for neck dissection, though CT neck was negative.     She will have a preop physical done by her primary care physician.  We will work on finding a surgical date that works in her schedule.    We did also discuss the HPV vaccine, meets FDA requirements by age, encouraged to discuss with PCP.    Thank you very much for the opportunity to participate in the care of your patient.      Prema Krishnamurthy MD  Otolaryngology- Head & Neck Surgery      This note was dictated with voice recognition software and then edited. Please excuse any unintentional errors.       CC:  Candelaria Silva MD  [address] [address]      Joby Aldana, DO  [address] [place] [id number]

## 2023-01-13 NOTE — NURSING NOTE
"Chief Complaint   Patient presents with     Consult     Papillary thyroid cancer       Blood pressure 130/79, pulse 99, height 1.854 m (6' 1\"), weight 109.3 kg (241 lb), SpO2 98 %.    Darshana Isidro, EMT    "

## 2023-01-13 NOTE — PATIENT INSTRUCTIONS
"You were seen in the clinic today by Dr. Krishnamurthy. If you have any questions or concerns after your appointment, please call the clinic at [phone]. Press \"1\" for scheduling, press \"3\" for nurse advice.    2.   The following has been recommended for you based upon your appointment today:   -neck ultrasound      Elzbieta KENDALL, RN  [facility]  Department of Otolaryngology  [phone]      Surgery Teaching Surgery Teaching      1.You must have a physical exam (called  history and physical ) within 30 days of surgery. You can do this at the PAC clinic or your family clinic. Bring the Pre-Op Surgery Form (found in the surgery packet that you received in the mail) with you to your primary doctor if you chose to have them complete this. If your doctor is in the [facility], they do not need this form.     2. Ask your doctor what medicines are safe before surgery.          * If you are on any blood-thinners, we will need to make a plan with your INR clinic or prescribing doctor of when you need to stop these medications before surgery    3. NO MOTRIN, IBUPROFEN, ASPIRIN, ALEVE, GARLIC SUPPLEMENTS or FISH OIL x 7 days prior to surgery ( to prevent excess bleeding and bruising at time of surgery).    4. For same-day surgery, you must arrange for an adult to take you home from the Center. An adult must stay with you for the first 24 hours after surgery. You cannot drive for 24 hours, or longer if you are still taking narcotic medications.      5. Stop drinking alcohol at least 24 hours before surgery.      6. Stop or at least cut down on smoking 24 hours before surgery.     7. Take a bath or shower the night before and the morning of surgery (refer to surgery packet for extra information). You should use the soap that we mailed to you or gave in clinic (2 small bottles). Use the entire bottle of soap for each shower, washing from the neck down, then rinsing off. Sleep in clean clothing and use clean " bedding sheets. If your doctor does not give you special soap, buy Hibiclens or Maribel-Stat at the drug store or ask the pharmacist to suggest a brand. Do not put on lotion, powder, perfume, deodorant or make-up after bathing.     8. You can eat a normal meal the night before surgery. Do not eat any solid foods or drink any milk products for 8 hours before surgery. A pre-op nurse will call you the week before surgery to confirm your eating cut-off time, but please listen to this 8-hour rule if you miss their call.     9. You may drink clear liquids until 2 hours before surgery. Clear liquids include water, Gatorade, apple juice, or other liquids you can read through.    10.  If you need FMLA paperwork filled out for your surgery, please send this to us before surgery if you are able (in-person, fax, or mail). DO NOT give this to the pre-op nurses on the day of surgery or it will get lost.    11. Generally, we recommend 1 week off of work for healing after surgery. If you have a labor-intensive job with heavy lifting, you may need a work-modification and we are able to give you a work letter for this so that you can continue to work.

## 2023-01-16 LAB
PATH REPORT.ADDENDUM SPEC: ABNORMAL
PATH REPORT.COMMENTS IMP SPEC: ABNORMAL
PATH REPORT.COMMENTS IMP SPEC: YES
PATH REPORT.FINAL DX SPEC: ABNORMAL
PATH REPORT.GROSS SPEC: ABNORMAL
PATH REPORT.MICROSCOPIC SPEC OTHER STN: ABNORMAL
PATH REPORT.RELEVANT HX SPEC: ABNORMAL

## 2023-01-18 ENCOUNTER — ANCILLARY PROCEDURE (OUTPATIENT)
Dept: ULTRASOUND IMAGING | Facility: CLINIC | Age: 43
End: 2023-01-18
Attending: OTOLARYNGOLOGY
Payer: COMMERCIAL

## 2023-01-18 DIAGNOSIS — C73 PAPILLARY THYROID CARCINOMA (H): ICD-10-CM

## 2023-01-18 PROCEDURE — 76536 US EXAM OF HEAD AND NECK: CPT | Mod: GC | Performed by: STUDENT IN AN ORGANIZED HEALTH CARE EDUCATION/TRAINING PROGRAM

## 2023-01-23 ENCOUNTER — TELEPHONE (OUTPATIENT)
Dept: OTOLARYNGOLOGY | Facility: CLINIC | Age: 43
End: 2023-01-23
Payer: COMMERCIAL

## 2023-01-23 DIAGNOSIS — C73 PAPILLARY THYROID CARCINOMA (H): ICD-10-CM

## 2023-01-23 DIAGNOSIS — C73 PAPILLARY THYROID CARCINOMA (H): Primary | ICD-10-CM

## 2023-01-23 NOTE — CONSULTS
Outpatient Neuroradiology Biopsy Referral    Patient is a 43 y/o female with a PMH of globus sensation, pancreatic cyst, HDL, right anterior neck mass 11/21/22, new diagnosis of papillary thyroid cancer 12/22/22, HTN, tobacco use, gender dysphoria.  Neuroradiology has been asked to biopsy a right level 3 lymph node.    US 1/18/23 IMPRESSION: Soft tissue neck ultrasound with lymph node measurements  as described above. Possible echogenic foci in the enlarged right  level 3 lymph node. Consider FNA for further evaluation.    CT 12/22/22 IMPRESSION:    1. Right thyroid nodule measuring over 5 cm previously characterized  by ultrasound. Recommend tissue sampling.  2. No evidence of cervical lymphadenopathy    Case and imaging US 1/23/23 and CT 12/22/22 was reviewed with Dr. Hernandez from Neuroradiology who has concerns of the utility of this biopsy for planned forward treatment of surgery.  NR would like to know which lymph node referring team would like biopsied and if bilateral lymph nodes should be biopsied if it will alter forward treatment plan.       Dr. Yessy LIPSCOMB notes that the planned surgery does not include a lateral neck dissection on this patient unless the node is biopsy positive. The radiologist did not indicate concerns about nodes on both sides, but if they feel there are concerning nodes on both sides then we should biopsy them since we would not do bilateral neck dissections unless there is known disease.     Case and Images US 1/23/23 and CT 12/22/22 was reviewed with Dr. Crouch from NR who recommends an FNA of the Right level 3 lymph node only.     Procedure order and FNA entered.     Referring team to enter additional samples if needed.       Primary team Dr.Khaja LIPSCOMB made aware of Neuroradiology recommendations via epic messaging.    ROLAND Eid CNP  Interventional Radiology   IR on-call pager: [phone]

## 2023-01-23 NOTE — TELEPHONE ENCOUNTER
Called patient to review results from ultrasound.     IMPRESSION: Soft tissue neck ultrasound with lymph node measurements  as described above. Possible echogenic foci in the enlarged right  level 3 lymph node. Consider FNA for further evaluation..    Plan for biopsy for right lymph node. Patient agreeable with plan of care.

## 2023-01-24 ENCOUNTER — TELEPHONE (OUTPATIENT)
Dept: OTOLARYNGOLOGY | Facility: CLINIC | Age: 43
End: 2023-01-24
Payer: COMMERCIAL

## 2023-01-24 NOTE — TELEPHONE ENCOUNTER
Called patient to schedule surgery with Dr. Krishnamurthy    Date of Surgery: 2/21    Location of surgery: [place]    Pre-Op H&P: PCP    Post-Op Appt Date: 1-2 weeks    Surgery Packet Mailed: yes      Additional comments: Spoke with patient, they are aware of above dates, will review packet and reach out with any questions          Anna C. Schoenecker on 1/24/2023 at 2:37 PM

## 2023-01-25 ENCOUNTER — TELEPHONE (OUTPATIENT)
Dept: OTOLARYNGOLOGY | Facility: CLINIC | Age: 43
End: 2023-01-25
Payer: COMMERCIAL

## 2023-01-25 NOTE — TELEPHONE ENCOUNTER
[facility] Call Center    Phone Message    May a detailed message be left on voicemail: yes     Reason for Call: Order(s): Other:  Orders placed for IR Fine Needle Aspiration w Ultrasound Papillary thyroid carcinoma (H) [C73] by Dr. Krishnamurthy, patient calling to see if someone will be reaching out to schedule for this or if there is a number the patient should be calling to request an appt.  Writer called imaging who stated they needed additional information.  Please advise.  Thank you!      Action Taken: Message routed to:  [facility] ([facility] ENT    Travel Screening: Not Applicable

## 2023-01-25 NOTE — TELEPHONE ENCOUNTER
Author called patient to inform her that IR should be reaching out to her to schedule her biopsy.  Author provided patient with IR scheduling phone number in the event that she does not hear from IR and would like to call and schedule the appointment herself.  Patient had no further questions at the time of the call and was agreeable to the plan.  Di Song LPN

## 2023-01-26 ENCOUNTER — MYC MEDICAL ADVICE (OUTPATIENT)
Dept: INTERVENTIONAL RADIOLOGY/VASCULAR | Facility: CLINIC | Age: 43
End: 2023-01-26
Payer: COMMERCIAL

## 2023-01-27 DIAGNOSIS — F64.0 GENDER DYSPHORIA IN ADULT: Primary | ICD-10-CM

## 2023-01-30 ENCOUNTER — TELEPHONE (OUTPATIENT)
Dept: FAMILY MEDICINE | Facility: CLINIC | Age: 43
End: 2023-01-30
Payer: COMMERCIAL

## 2023-01-30 RX ORDER — ESTRADIOL VALERATE 20 MG/ML
INJECTION INTRAMUSCULAR
Qty: 5 ML | Refills: 0 | OUTPATIENT
Start: 2023-01-30

## 2023-01-30 RX ORDER — ESTRADIOL VALERATE 20 MG/ML
INJECTION INTRAMUSCULAR
Qty: 5 ML | Refills: 1 | Status: SHIPPED | OUTPATIENT
Start: 2023-01-30 | End: 2023-05-03

## 2023-01-30 NOTE — TELEPHONE ENCOUNTER
"Request for medication refill:  estradiol valerate (DELESTROGEN) 20 MG/ML injection  Providers if patient needs an appointment and you are willing to give a one month supply please refill for one month and  send a letter/MyChart using \".SMILLIMITEDREFILL\" .smillimited and route chart to \"P SMI \" (Giving one month refill in non controlled medications is strongly recommended before denial)    If refill has been denied, meaning absolutely no refills without visit, please complete the smart phrase \".smirxrefuse\" and route it to the \"P SMI MED REFILLS\"  pool to inform the patient and the pharmacy.    Rosey Bowen RN        "

## 2023-02-13 ENCOUNTER — APPOINTMENT (OUTPATIENT)
Dept: MEDSURG UNIT | Facility: CLINIC | Age: 43
End: 2023-02-13
Attending: OTOLARYNGOLOGY
Payer: COMMERCIAL

## 2023-02-13 ENCOUNTER — APPOINTMENT (OUTPATIENT)
Dept: INTERVENTIONAL RADIOLOGY/VASCULAR | Facility: CLINIC | Age: 43
End: 2023-02-13
Attending: OTOLARYNGOLOGY
Payer: COMMERCIAL

## 2023-02-13 ENCOUNTER — HOSPITAL ENCOUNTER (OUTPATIENT)
Facility: CLINIC | Age: 43
Discharge: HOME | End: 2023-02-13
Attending: OTOLARYNGOLOGY | Admitting: STUDENT IN AN ORGANIZED HEALTH CARE EDUCATION/TRAINING PROGRAM
Payer: COMMERCIAL

## 2023-02-13 VITALS
DIASTOLIC BLOOD PRESSURE: 80 MMHG | HEIGHT: 73 IN | HEART RATE: 84 BPM | OXYGEN SATURATION: 98 % | TEMPERATURE: 98.1 F | WEIGHT: 244.49 LBS | SYSTOLIC BLOOD PRESSURE: 135 MMHG | RESPIRATION RATE: 16 BRPM | BODY MASS INDEX: 32.4 KG/M2

## 2023-02-13 DIAGNOSIS — C73 PAPILLARY THYROID CARCINOMA (H): ICD-10-CM

## 2023-02-13 LAB
ERYTHROCYTE [DISTWIDTH] IN BLOOD BY AUTOMATED COUNT: 12.9 % (ref 10–15)
HCT VFR BLD AUTO: 40 % (ref 35–53)
HGB BLD-MCNC: 12.8 G/DL (ref 11.7–17.7)
INR PPP: 1.02 (ref 0.85–1.15)
MCH RBC QN AUTO: 29.2 PG (ref 26.5–33)
MCHC RBC AUTO-ENTMCNC: 32 G/DL (ref 31.5–36.5)
MCV RBC AUTO: 91 FL (ref 78–100)
PLATELET # BLD AUTO: 300 10E3/UL (ref 150–450)
RBC # BLD AUTO: 4.39 10E6/UL (ref 3.8–5.9)
WBC # BLD AUTO: 9.4 10E3/UL (ref 4–11)

## 2023-02-13 PROCEDURE — 85014 HEMATOCRIT: CPT | Performed by: NURSE PRACTITIONER

## 2023-02-13 PROCEDURE — 250N000009 HC RX 250: Performed by: STUDENT IN AN ORGANIZED HEALTH CARE EDUCATION/TRAINING PROGRAM

## 2023-02-13 PROCEDURE — 88305 TISSUE EXAM BY PATHOLOGIST: CPT | Mod: TC | Performed by: OTOLARYNGOLOGY

## 2023-02-13 PROCEDURE — 10005 FNA BX W/US GDN 1ST LES: CPT | Mod: GC | Performed by: STUDENT IN AN ORGANIZED HEALTH CARE EDUCATION/TRAINING PROGRAM

## 2023-02-13 PROCEDURE — 999N000132 HC STATISTIC PP CARE STAGE 1

## 2023-02-13 PROCEDURE — 36415 COLL VENOUS BLD VENIPUNCTURE: CPT | Performed by: NURSE PRACTITIONER

## 2023-02-13 PROCEDURE — 88341 IMHCHEM/IMCYTCHM EA ADD ANTB: CPT | Mod: 26 | Performed by: PATHOLOGY

## 2023-02-13 PROCEDURE — 85610 PROTHROMBIN TIME: CPT | Performed by: NURSE PRACTITIONER

## 2023-02-13 PROCEDURE — 88172 CYTP DX EVAL FNA 1ST EA SITE: CPT | Mod: 26 | Performed by: PATHOLOGY

## 2023-02-13 PROCEDURE — 258N000003 HC RX IP 258 OP 636: Performed by: NURSE PRACTITIONER

## 2023-02-13 PROCEDURE — 84432 ASSAY OF THYROGLOBULIN: CPT | Performed by: STUDENT IN AN ORGANIZED HEALTH CARE EDUCATION/TRAINING PROGRAM

## 2023-02-13 PROCEDURE — 10005 FNA BX W/US GDN 1ST LES: CPT

## 2023-02-13 PROCEDURE — 88305 TISSUE EXAM BY PATHOLOGIST: CPT | Mod: 26 | Performed by: PATHOLOGY

## 2023-02-13 PROCEDURE — 999N000142 HC STATISTIC PROCEDURE PREP ONLY

## 2023-02-13 PROCEDURE — 88173 CYTOPATH EVAL FNA REPORT: CPT | Mod: 26 | Performed by: PATHOLOGY

## 2023-02-13 PROCEDURE — 88342 IMHCHEM/IMCYTCHM 1ST ANTB: CPT | Mod: 26 | Performed by: PATHOLOGY

## 2023-02-13 RX ORDER — LIDOCAINE HYDROCHLORIDE 10 MG/ML
1-30 INJECTION, SOLUTION EPIDURAL; INFILTRATION; INTRACAUDAL; PERINEURAL
Status: COMPLETED | OUTPATIENT
Start: 2023-02-13 | End: 2023-02-13

## 2023-02-13 RX ORDER — SODIUM CHLORIDE 9 MG/ML
INJECTION, SOLUTION INTRAVENOUS CONTINUOUS
Status: DISCONTINUED | OUTPATIENT
Start: 2023-02-13 | End: 2023-02-13 | Stop reason: HOSPADM

## 2023-02-13 RX ORDER — LIDOCAINE 40 MG/G
CREAM TOPICAL
Status: DISCONTINUED | OUTPATIENT
Start: 2023-02-13 | End: 2023-02-13 | Stop reason: HOSPADM

## 2023-02-13 RX ADMIN — LIDOCAINE HYDROCHLORIDE 2 ML: 10 INJECTION, SOLUTION EPIDURAL; INFILTRATION; INTRACAUDAL; PERINEURAL at 11:14

## 2023-02-13 RX ADMIN — SODIUM CHLORIDE: 9 INJECTION, SOLUTION INTRAVENOUS at 08:33

## 2023-02-13 ASSESSMENT — ACTIVITIES OF DAILY LIVING (ADL)
ADLS_ACUITY_SCORE: 35
ADLS_ACUITY_SCORE: 35

## 2023-02-13 NOTE — IR NOTE
Patient Name: Gordon Stack  Medical Record Number: [id number]  Today's Date: 2/13/2023    Procedure: ultrasound guided fine needle aspirate of right level three cervical lymph node   Proceduralist: Josh HEADLEY, Alyssa HEADLEY  Pathology present: yes - 4 passes     Procedure Start: 1102  Procedure end: 1118  Sedation medications administered: local lidocaine only    Report given to: Karen CONTRERAS RN  : N/A    Other Notes: Pt arrived to IR room 07 from . Consent reviewed. Pt denies any questions or concerns regarding procedure. Pt positioned supine and monitored per protocol. Pt tolerated procedure without any noted complications. Right neck site cleansed and dressed per protocol. Pt transferred back to .

## 2023-02-13 NOTE — PROGRESS NOTES
Arrived from home for a fine needle aspiration of the thyroid mass.  VSS.  Denies pain.  PIV placed and labs sent.  H&P current.  Needs consent and labs resulted.

## 2023-02-13 NOTE — PROGRESS NOTES
Tolerated bedrest without issues. Tolerated food, fluids, ambulation.  Right neck site CDI.  Reviewed discharge instructions with Gordon.  Discharged to home.

## 2023-02-13 NOTE — DISCHARGE INSTRUCTIONS
[facility]    Interventional Radiology  Patient Instructions Following Fine Needle Aspiration of Thyroid    AFTER YOU GO HOME    DO relax and take it easy for 48 hours, no strenuous activity for 24 hours  DO drink plenty of fluids  DO resume your regular diet, unless otherwise instructed by your Primary Physician  Keep the dressing dry and in place for 24 hours.  DO NOT take a bath or shower for at least 12 hours following your procedure  Remove dressing after shower the next day. Replace with Band aid for 2 days.  Never leave a wet dressing in place.  There should be minimum drainage from the biopsy site    CALL THE PHYSICIAN IF:  You start bleeding from the procedure site.  If you do start to bleed from that site, lie down flat and hold pressure on the site for a minimum of 10 minutes.  Your physician will tell you if you need to return to the hospital  You develop nausea or vomiting  You have excessive swelling, redness, or tenderness at the site  You have drainage that looks like it is infected.  You experience severe pain  You develop hives or a rash or unexplained itching  You develop shortness of breath  You develop a temperature of 101 degrees F or greater  [facility] INTERVENTIONAL RADIOLOGY DEPARTMENT  Procedure Physician: Charles Keys and Josh                                   Date of procedure: February 13, 2023  Telephone Numbers: [phone]      Monday-Friday 7:30 am to 4:00 pm  [phone] After 4:00 pm Monday-Friday, Weekends & Holidays.   Ask for the Interventional Radiologist on call.  Someone is on call 24 hrs/day  [facility] toll free number: [phone] Monday-Friday 8:00 am to 4:30 pm  [facility] Emergency Dept: [phone]

## 2023-02-14 ENCOUNTER — OFFICE VISIT (OUTPATIENT)
Dept: FAMILY MEDICINE | Facility: CLINIC | Age: 43
End: 2023-02-14
Payer: COMMERCIAL

## 2023-02-14 VITALS
OXYGEN SATURATION: 97 % | HEART RATE: 97 BPM | DIASTOLIC BLOOD PRESSURE: 81 MMHG | WEIGHT: 244.6 LBS | RESPIRATION RATE: 18 BRPM | HEIGHT: 73 IN | BODY MASS INDEX: 32.42 KG/M2 | SYSTOLIC BLOOD PRESSURE: 157 MMHG

## 2023-02-14 DIAGNOSIS — Z01.818 PREOP GENERAL PHYSICAL EXAM: Primary | ICD-10-CM

## 2023-02-14 DIAGNOSIS — F64.0 GENDER DYSPHORIA IN ADULT: ICD-10-CM

## 2023-02-14 PROCEDURE — 99213 OFFICE O/P EST LOW 20 MIN: CPT | Mod: GC | Performed by: STUDENT IN AN ORGANIZED HEALTH CARE EDUCATION/TRAINING PROGRAM

## 2023-02-14 PROCEDURE — 36415 COLL VENOUS BLD VENIPUNCTURE: CPT | Performed by: STUDENT IN AN ORGANIZED HEALTH CARE EDUCATION/TRAINING PROGRAM

## 2023-02-14 PROCEDURE — 84403 ASSAY OF TOTAL TESTOSTERONE: CPT | Mod: KX | Performed by: STUDENT IN AN ORGANIZED HEALTH CARE EDUCATION/TRAINING PROGRAM

## 2023-02-14 PROCEDURE — 80048 BASIC METABOLIC PNL TOTAL CA: CPT | Performed by: STUDENT IN AN ORGANIZED HEALTH CARE EDUCATION/TRAINING PROGRAM

## 2023-02-14 NOTE — PROGRESS NOTES
[address] [address]  Phone: [phone]  Fax: [phone]  Primary Provider: Joby Aldana  Pre-op Performing Provider: MONICA WOO      PREOPERATIVE EVALUATION:  Today's date: 2/14/2023    Gordon Stack is a 42 year old adult who presents for a preoperative evaluation.    Surgical Information:  Surgery/Procedure: Thyroidectomy   Surgery Location: [facility]  Surgeon: Prema Krishnamurthy MD  Surgery Date: 2/21/2023  Time of Surgery: 0800  Where patient plans to recover: At home with family  Fax number for surgical facility: Note does not need to be faxed, will be available electronically in Epic.    Type of Anesthesia Anticipated: General    Assessment & Plan     The proposed surgical procedure is considered INTERMEDIATE risk.    Preop general physical exam  Thyroidectomy scheduled for 2/21/2023.  CBC obtained yesterday, with hemoglobin of 12.8.    - Basic metabolic panel; Future  - Basic metabolic panel      Possible Sleep Apnea:    STOP-Bang Total Score 2/14/2023   Total Score 2   Risk Stratification 0 - 2: Low Risk for GORGE        Stop-bang score of 2, low risk            RECOMMENDATION:  APPROVAL GIVEN to proceed with proposed procedure, without further diagnostic evaluation.        Subjective     HPI related to upcoming procedure: Diagnosed with papillary thyroid carcinoma in December 2023.  Total thyroidectomy scheduled for 2/21/2023.    Preop Questions 2/13/2023   1. Have you ever had a heart attack or stroke? No   2. Have you ever had surgery on your heart or blood vessels, such as a stent placement, a coronary artery bypass, or surgery on an artery in your head, neck, heart, or legs? No   3. Do you have chest pain with activity? No   4. Do you have a history of  heart failure? No   5. Do you currently have a cold, bronchitis or symptoms of other infection? No   6. Do you have a cough, shortness of breath,  or wheezing? No   7. Do you or anyone in your family have previous history of blood clots? No   8. Do you or does anyone in your family have a serious bleeding problem such as prolonged bleeding following surgeries or cuts? No   9. Have you ever had problems with anemia or been told to take iron pills? No   10. Have you had any abnormal blood loss such as black, tarry or bloody stools, or abnormal vaginal bleeding? No   10. Have you had any abnormal blood loss such as black, tarry or bloody stools? No   11. Have you ever had a blood transfusion? No   12. Are you willing to have a blood transfusion if it is medically needed before, during, or after your surgery? Yes   13. Have you or any of your relatives ever had problems with anesthesia? No   14. Do you have sleep apnea, excessive snoring or daytime drowsiness? UNKNOWN - stop-bang 2, low risk   15. Do you have any artifical heart valves or other implanted medical devices like a pacemaker, defibrillator, or continuous glucose monitor? No   16. Do you have artificial joints? No   17. Are you allergic to latex? No   18. Is there any chance that you may be pregnant? No       Health Care Directive:  Patient does not have a Health Care Directive or Living Will: Discussed advance care planning with patient; however, patient declined at this time.    Preoperative Review of :   reviewed - no record of controlled substances prescribed.      Status of Chronic Conditions:  See problem list for active medical problems.  Problems all longstanding and stable, except as noted/documented.  See ROS for pertinent symptoms related to these conditions.      Review of Systems  CONSTITUTIONAL: NEGATIVE for fever, chills, change in weight  INTEGUMENTARY/SKIN: NEGATIVE for worrisome rashes, moles or lesions  EYES: NEGATIVE for vision changes or irritation  ENT/MOUTH: NEGATIVE for ear, mouth and throat problems  RESP: NEGATIVE for significant cough or SOB  CV: NEGATIVE for chest pain,  palpitations or peripheral edema  GI: NEGATIVE for nausea, abdominal pain, heartburn, or change in bowel habits  : NEGATIVE for frequency, dysuria, or hematuria  MUSCULOSKELETAL: NEGATIVE for significant arthralgias or myalgia  NEURO: NEGATIVE for weakness, dizziness or paresthesias  ENDOCRINE: NEGATIVE for temperature intolerance, skin/hair changes  HEME: NEGATIVE for bleeding problems  PSYCHIATRIC: NEGATIVE for changes in mood or affect    Patient Active Problem List    Diagnosis Date Noted     Papillary thyroid carcinoma (H) 12/28/2022     Priority: Medium     Hyperlipidemia LDL goal <100 10/20/2020     Priority: Medium     Benign essential hypertension 10/03/2017     Priority: Medium     HTN diagnosed 2017 with consistently elevated BP readings in clinic, delayed initiation of antihypertensive due to suspicion of white coat hypertension. Was on lisinopril 20 mg in the past but was symptomatic at this dose (lightheadedn/dizzy) and patient stopped taking it. Re-started lisinopril at 5 mg in 9/2020 with intermittent dose increases up to 15 mg daily starting in 11/2020. 1/2021 BP re-check-- readings still elevated (up to 145/94) but stable compared to last visit. Pt not interested in adding another medication (discussed increasing spironolactone or adding hydrochlorothiazide), would rather proceed with more conservative measures and continue to monitor which seems reasonable at this time. Given no change in BP from last visit and for ease of dosing, plan to return to lisinopril 10 mg daily (so pt doesn't have to cut pill in half).       Treated H pylori with ulcer 10/03/2017     Priority: Medium     Did not have test of cure       Gender dysphoria in adult 02/02/2017     Priority: Medium     Started estradiol in 12/2016, and spironolactone 10/2016.        Pancreatic cyst 02/02/2017     Priority: Medium     8/2016 with cyst on pancreas with some abdominal pain.  0.6cm. Recommended MRI in 8/2016 and referral to GI.   "       Globus sensation 07/29/2015     Priority: Medium     Nasal congestion 07/29/2015     Priority: Medium     Tobacco abuse 07/29/2015     Priority: Medium     Formatting of this note might be different from the original.  patch-disliked  Gum-teeth felt funny        Past Medical History:   Diagnosis Date     Allergic state      Gender dysphoria      Hypertension      Malignant neoplasm of thyroid gland (H)      Mixed hyperlipidemia      Past Surgical History:   Procedure Laterality Date     IR FINE NEEDLE ASPIRATION W ULTRASOUND  02/13/2023     THYROIDECTOMY N/A 2/21/2023    Procedure: THYROIDECTOMY, TOTAL with right neck dissection; parathyroid reimplantation x2;  Surgeon: Prema Krishnamurthy MD;  Location: [facility] OR     THYROIDECTOMY, TOTAL with right neck dissection; parathyroid reimplantation x2  02/21/2023     Current Outpatient Medications   Medication Sig Dispense Refill     B-D HYPODERMIC NEEDLE 23G X 1\" MISC Use for estradiol weekly injections 25 each 3     B-D SYRINGE LUER-EUSEBIO 1 ML MISC Use for weekly injections 25 each 1     BD DISP NEEDLES 18G X 1-1/2\" MISC USE FOR WEEKLY ESTRADIOL INJECTIONS. 25 each 3     BD HYPODERMIC NEEDLE 18G X 1\" MISC Use for weekly estradiol injections 25 each 3     estradiol valerate (DELESTROGEN) 20 MG/ML injection INJECT 1 ML (20MG) INTO THE MUSLE EVERY 28 DAYS 5 mL 1     estradiol valerate (DELESTROGEN) 20 MG/ML injection INJECT 1 ML (20MG) INTO THE MUSLE EVERY 28 DAYS 5 mL 0     estradiol valerate (DELESTROGEN) 20 MG/ML injection Inject 1 mL (20 mg) into the muscle every 28 days 1 mL 3     estradiol valerate (DELESTROGEN) 20 MG/ML injection INJECT 0.2ML INTRAMUSCULARLY ONCE A WEEK. DISCARD VIAL 28 DAYS AFTER FIRST USE 5 mL 0     fluticasone (FLONASE) 50 MCG/ACT nasal spray SPRAY 1 SPRAY INTO BOTH NOSTRILS DAILY 15.8 mL 1     lisinopril (ZESTRIL) 10 MG tablet Take 1 tablet (10 mg) by mouth daily Please schedule your annual check up for November 2021 90 tablet 1     " "spironolactone (ALDACTONE) 100 MG tablet Take 1 tablet (100 mg) by mouth daily 90 tablet 3     calcitRIOL (ROCALTROL) 0.25 MCG capsule Take 1 capsule (0.25 mcg) by mouth 2 times daily 60 capsule 1     calcium carbonate (TUMS) 500 MG chewable tablet Take 5 tablets (2,500 mg) by mouth 3 times daily 300 tablet 0     levothyroxine (SYNTHROID/LEVOTHROID) 125 MCG tablet Take 1 tablet (125 mcg) by mouth every morning (before breakfast) 60 tablet 3     mineral oil-hydrophilic petrolatum (AQUAPHOR) external ointment Apply topically every 8 hours Apply to incision. 50 g 1     oxyCODONE (ROXICODONE) 5 MG tablet Take 1 tablet (5 mg) by mouth every 4 hours as needed for moderate pain (4-6) 20 tablet 0     sennosides (SENOKOT) 8.6 MG tablet Take 1 tablet by mouth daily as needed for constipation 20 tablet 0       No Known Allergies     Social History     Tobacco Use     Smoking status: Some Days     Smokeless tobacco: Never   Substance Use Topics     Alcohol use: Yes     Comment: occ     Family History   Problem Relation Age of Onset     Asthma Brother      History   Drug Use No         Objective     BP (!) 157/81   Pulse 97   Resp 18   Ht 1.854 m (6' 1\")   Wt 110.9 kg (244 lb 9.6 oz)   SpO2 97%   BMI 32.27 kg/m      Physical Exam    GENERAL APPEARANCE: healthy, alert and no distress     EYES: EOMI,  PERRL     HENT: ear canals and TM's normal and nose and mouth without ulcers or lesions     NECK: no adenopathy, no asymmetry, masses, or scars and thyroid normal to palpation     RESP: lungs clear to auscultation - no rales, rhonchi or wheezes     CV: regular rates and rhythm, normal S1 S2, no S3 or S4 and no murmur, click or rub     ABDOMEN:  soft, nontender, no HSM or masses and bowel sounds normal     MS: extremities normal- no gross deformities noted, no evidence of inflammation in joints, FROM in all extremities.     SKIN: no suspicious lesions or rashes     NEURO: Normal strength and tone, sensory exam grossly normal, " mentation intact and speech normal     PSYCH: mentation appears normal. and affect normal/bright     LYMPHATICS: No cervical adenopathy    Recent Labs   Lab Test 02/13/23  0825 11/11/22  1359 11/17/21  0848   HGB 12.8 13.2 12.8    303  --    INR 1.02  --   --    NA  --  137 137   POTASSIUM  --  4.1 4.4   CR  --  0.78 0.77        Diagnostics:  Labs pending at this time.  Results will be reviewed when available.   No EKG required, no history of coronary heart disease, significant arrhythmia, peripheral arterial disease or other structural heart disease.    Revised Cardiac Risk Index (RCRI):  The patient has the following serious cardiovascular risks for perioperative complications:   - No serious cardiac risks = 0 points      RCRI Interpretation: 0 points: Class I (very low risk - 3.9% complication rate)           Signed Electronically by: Kaya De La Rosa MD  Copy of this evaluation report is provided to requesting physician.

## 2023-02-15 LAB
ANION GAP SERPL CALCULATED.3IONS-SCNC: 14 MMOL/L (ref 7–15)
BUN SERPL-MCNC: 9 MG/DL (ref 6–20)
CALCIUM SERPL-MCNC: 10 MG/DL (ref 8.6–10)
CHLORIDE SERPL-SCNC: 104 MMOL/L (ref 98–107)
CREAT SERPL-MCNC: 0.8 MG/DL (ref 0.51–1.17)
DEPRECATED HCO3 PLAS-SCNC: 21 MMOL/L (ref 22–29)
GFR SERPL CREATININE-BSD FRML MDRD: >90 ML/MIN/1.73M2
GLUCOSE SERPL-MCNC: 77 MG/DL (ref 70–99)
POTASSIUM SERPL-SCNC: 4 MMOL/L (ref 3.4–5.3)
SODIUM SERPL-SCNC: 139 MMOL/L (ref 136–145)

## 2023-02-16 LAB
SCANNED LAB RESULT: ABNORMAL
TESTOST SERPL-MCNC: 14 NG/DL (ref 8–950)

## 2023-02-17 ENCOUNTER — PATIENT OUTREACH (OUTPATIENT)
Dept: OTOLARYNGOLOGY | Facility: CLINIC | Age: 43
End: 2023-02-17
Payer: COMMERCIAL

## 2023-02-17 ENCOUNTER — PREP FOR PROCEDURE (OUTPATIENT)
Dept: OTOLARYNGOLOGY | Facility: CLINIC | Age: 43
End: 2023-02-17
Payer: COMMERCIAL

## 2023-02-17 LAB
PATH REPORT.COMMENTS IMP SPEC: ABNORMAL
PATH REPORT.COMMENTS IMP SPEC: YES
PATH REPORT.FINAL DX SPEC: ABNORMAL
PATH REPORT.GROSS SPEC: ABNORMAL
PATH REPORT.MICROSCOPIC SPEC OTHER STN: ABNORMAL
PATH REPORT.RELEVANT HX SPEC: ABNORMAL

## 2023-02-17 NOTE — PROGRESS NOTES
LVM for patient to review results from biopsy.    Final Diagnosis   Specimen A                 Interpretation:                  - Metastatic carcinoma compatible with papillary thyroid carcinoma (see comment)                 Adequacy:                 Satisfactory for evaluation       Plan to move forward with surgery and added neck dissection. Provided direct call back number for patient to return call.

## 2023-02-20 ENCOUNTER — ANESTHESIA EVENT (OUTPATIENT)
Dept: SURGERY | Facility: CLINIC | Age: 43
End: 2023-02-20
Payer: COMMERCIAL

## 2023-02-21 ENCOUNTER — HOSPITAL ENCOUNTER (OUTPATIENT)
Facility: CLINIC | Age: 43
Discharge: HOME | End: 2023-02-22
Attending: OTOLARYNGOLOGY | Admitting: OTOLARYNGOLOGY
Payer: COMMERCIAL

## 2023-02-21 ENCOUNTER — ANESTHESIA (OUTPATIENT)
Dept: SURGERY | Facility: CLINIC | Age: 43
End: 2023-02-21
Payer: COMMERCIAL

## 2023-02-21 DIAGNOSIS — Z98.890 POSTOPERATIVE STATE: ICD-10-CM

## 2023-02-21 DIAGNOSIS — G89.18 ACUTE POST-OPERATIVE PAIN: ICD-10-CM

## 2023-02-21 DIAGNOSIS — C73 PAPILLARY THYROID CARCINOMA (H): Primary | ICD-10-CM

## 2023-02-21 LAB
ABO/RH(D): NORMAL
ANTIBODY SCREEN: NEGATIVE
CA-I BLD-MCNC: 4.4 MG/DL (ref 4.4–5.2)
CA-I BLD-MCNC: 4.4 MG/DL (ref 4.4–5.2)
GLUCOSE BLDC GLUCOMTR-MCNC: 116 MG/DL (ref 70–99)
PTH-INTACT SERPL-MCNC: 4 PG/ML (ref 15–65)
SARS-COV-2 RNA RESP QL NAA+PROBE: NEGATIVE
SPECIMEN EXPIRATION DATE: NORMAL

## 2023-02-21 PROCEDURE — 250N000009 HC RX 250: Performed by: OTOLARYNGOLOGY

## 2023-02-21 PROCEDURE — 38724 REMOVAL OF LYMPH NODES NECK: CPT | Mod: RT | Performed by: OTOLARYNGOLOGY

## 2023-02-21 PROCEDURE — 272N000001 HC OR GENERAL SUPPLY STERILE: Performed by: OTOLARYNGOLOGY

## 2023-02-21 PROCEDURE — 370N000017 HC ANESTHESIA TECHNICAL FEE, PER MIN: Performed by: OTOLARYNGOLOGY

## 2023-02-21 PROCEDURE — 36415 COLL VENOUS BLD VENIPUNCTURE: CPT | Performed by: STUDENT IN AN ORGANIZED HEALTH CARE EDUCATION/TRAINING PROGRAM

## 2023-02-21 PROCEDURE — 82962 GLUCOSE BLOOD TEST: CPT

## 2023-02-21 PROCEDURE — 250N000011 HC RX IP 250 OP 636: Performed by: ANESTHESIOLOGY

## 2023-02-21 PROCEDURE — 60252 REMOVAL OF THYROID: CPT | Mod: 51 | Performed by: OTOLARYNGOLOGY

## 2023-02-21 PROCEDURE — 88331 PATH CONSLTJ SURG 1 BLK 1SPC: CPT | Mod: 26 | Performed by: STUDENT IN AN ORGANIZED HEALTH CARE EDUCATION/TRAINING PROGRAM

## 2023-02-21 PROCEDURE — 258N000003 HC RX IP 258 OP 636: Performed by: OTOLARYNGOLOGY

## 2023-02-21 PROCEDURE — 83970 ASSAY OF PARATHORMONE: CPT | Performed by: STUDENT IN AN ORGANIZED HEALTH CARE EDUCATION/TRAINING PROGRAM

## 2023-02-21 PROCEDURE — 86901 BLOOD TYPING SEROLOGIC RH(D): CPT | Performed by: OTOLARYNGOLOGY

## 2023-02-21 PROCEDURE — 258N000003 HC RX IP 258 OP 636: Performed by: STUDENT IN AN ORGANIZED HEALTH CARE EDUCATION/TRAINING PROGRAM

## 2023-02-21 PROCEDURE — 272N000002 HC OR SUPPLY OTHER OPNP: Performed by: OTOLARYNGOLOGY

## 2023-02-21 PROCEDURE — 88342 IMHCHEM/IMCYTCHM 1ST ANTB: CPT | Mod: 26 | Performed by: STUDENT IN AN ORGANIZED HEALTH CARE EDUCATION/TRAINING PROGRAM

## 2023-02-21 PROCEDURE — 250N000009 HC RX 250: Performed by: ANESTHESIOLOGY

## 2023-02-21 PROCEDURE — 88341 IMHCHEM/IMCYTCHM EA ADD ANTB: CPT | Mod: 26 | Performed by: STUDENT IN AN ORGANIZED HEALTH CARE EDUCATION/TRAINING PROGRAM

## 2023-02-21 PROCEDURE — 250N000011 HC RX IP 250 OP 636: Performed by: OTOLARYNGOLOGY

## 2023-02-21 PROCEDURE — 250N000009 HC RX 250: Performed by: STUDENT IN AN ORGANIZED HEALTH CARE EDUCATION/TRAINING PROGRAM

## 2023-02-21 PROCEDURE — 82330 ASSAY OF CALCIUM: CPT | Performed by: STUDENT IN AN ORGANIZED HEALTH CARE EDUCATION/TRAINING PROGRAM

## 2023-02-21 PROCEDURE — 88307 TISSUE EXAM BY PATHOLOGIST: CPT | Mod: 26 | Performed by: STUDENT IN AN ORGANIZED HEALTH CARE EDUCATION/TRAINING PROGRAM

## 2023-02-21 PROCEDURE — 999N000141 HC STATISTIC PRE-PROCEDURE NURSING ASSESSMENT: Performed by: OTOLARYNGOLOGY

## 2023-02-21 PROCEDURE — 88331 PATH CONSLTJ SURG 1 BLK 1SPC: CPT | Mod: TC | Performed by: OTOLARYNGOLOGY

## 2023-02-21 PROCEDURE — 88305 TISSUE EXAM BY PATHOLOGIST: CPT | Mod: TC | Performed by: OTOLARYNGOLOGY

## 2023-02-21 PROCEDURE — 88305 TISSUE EXAM BY PATHOLOGIST: CPT | Mod: 26 | Performed by: STUDENT IN AN ORGANIZED HEALTH CARE EDUCATION/TRAINING PROGRAM

## 2023-02-21 PROCEDURE — 36415 COLL VENOUS BLD VENIPUNCTURE: CPT | Performed by: OTOLARYNGOLOGY

## 2023-02-21 PROCEDURE — 250N000013 HC RX MED GY IP 250 OP 250 PS 637: Performed by: STUDENT IN AN ORGANIZED HEALTH CARE EDUCATION/TRAINING PROGRAM

## 2023-02-21 PROCEDURE — 258N000003 HC RX IP 258 OP 636: Performed by: ANESTHESIOLOGY

## 2023-02-21 PROCEDURE — 360N000077 HC SURGERY LEVEL 4, PER MIN: Performed by: OTOLARYNGOLOGY

## 2023-02-21 PROCEDURE — 710N000009 HC RECOVERY PHASE 1, LEVEL 1, PER MIN: Performed by: OTOLARYNGOLOGY

## 2023-02-21 PROCEDURE — U0005 INFEC AGEN DETEC AMPLI PROBE: HCPCS | Performed by: STUDENT IN AN ORGANIZED HEALTH CARE EDUCATION/TRAINING PROGRAM

## 2023-02-21 PROCEDURE — 250N000025 HC SEVOFLURANE, PER MIN: Performed by: OTOLARYNGOLOGY

## 2023-02-21 RX ORDER — LEVOTHYROXINE SODIUM 125 UG/1
125 TABLET ORAL
Status: DISCONTINUED | OUTPATIENT
Start: 2023-02-22 | End: 2023-02-22 | Stop reason: HOSPADM

## 2023-02-21 RX ORDER — DEXMEDETOMIDINE HYDROCHLORIDE 4 UG/ML
INJECTION, SOLUTION INTRAVENOUS PRN
Status: DISCONTINUED | OUTPATIENT
Start: 2023-02-21 | End: 2023-02-21

## 2023-02-21 RX ORDER — SODIUM CHLORIDE, SODIUM LACTATE, POTASSIUM CHLORIDE, CALCIUM CHLORIDE 600; 310; 30; 20 MG/100ML; MG/100ML; MG/100ML; MG/100ML
INJECTION, SOLUTION INTRAVENOUS CONTINUOUS
Status: DISCONTINUED | OUTPATIENT
Start: 2023-02-21 | End: 2023-02-21 | Stop reason: HOSPADM

## 2023-02-21 RX ORDER — SODIUM CHLORIDE, SODIUM LACTATE, POTASSIUM CHLORIDE, CALCIUM CHLORIDE 600; 310; 30; 20 MG/100ML; MG/100ML; MG/100ML; MG/100ML
INJECTION, SOLUTION INTRAVENOUS CONTINUOUS PRN
Status: DISCONTINUED | OUTPATIENT
Start: 2023-02-21 | End: 2023-02-21

## 2023-02-21 RX ORDER — SPIRONOLACTONE 100 MG/1
100 TABLET, FILM COATED ORAL DAILY
Status: DISCONTINUED | OUTPATIENT
Start: 2023-02-22 | End: 2023-02-22 | Stop reason: HOSPADM

## 2023-02-21 RX ORDER — CALCITRIOL 0.25 UG/1
0.25 CAPSULE, LIQUID FILLED ORAL 2 TIMES DAILY
Status: DISCONTINUED | OUTPATIENT
Start: 2023-02-21 | End: 2023-02-22 | Stop reason: HOSPADM

## 2023-02-21 RX ORDER — OXYCODONE HYDROCHLORIDE 5 MG/1
5 TABLET ORAL EVERY 4 HOURS PRN
Status: DISCONTINUED | OUTPATIENT
Start: 2023-02-21 | End: 2023-02-21 | Stop reason: HOSPADM

## 2023-02-21 RX ORDER — FENTANYL CITRATE 50 UG/ML
50 INJECTION, SOLUTION INTRAMUSCULAR; INTRAVENOUS EVERY 5 MIN PRN
Status: DISCONTINUED | OUTPATIENT
Start: 2023-02-21 | End: 2023-02-21 | Stop reason: HOSPADM

## 2023-02-21 RX ORDER — MINERAL OIL/HYDROPHIL PETROLAT
OINTMENT (GRAM) TOPICAL EVERY 8 HOURS
Status: DISCONTINUED | OUTPATIENT
Start: 2023-02-22 | End: 2023-02-22 | Stop reason: HOSPADM

## 2023-02-21 RX ORDER — NALOXONE HYDROCHLORIDE 0.4 MG/ML
0.2 INJECTION, SOLUTION INTRAMUSCULAR; INTRAVENOUS; SUBCUTANEOUS
Status: DISCONTINUED | OUTPATIENT
Start: 2023-02-21 | End: 2023-02-22 | Stop reason: HOSPADM

## 2023-02-21 RX ORDER — OXYCODONE HYDROCHLORIDE 5 MG/1
5 TABLET ORAL EVERY 4 HOURS PRN
Status: DISCONTINUED | OUTPATIENT
Start: 2023-02-21 | End: 2023-02-22 | Stop reason: HOSPADM

## 2023-02-21 RX ORDER — LIDOCAINE 40 MG/G
CREAM TOPICAL
Status: DISCONTINUED | OUTPATIENT
Start: 2023-02-21 | End: 2023-02-21 | Stop reason: HOSPADM

## 2023-02-21 RX ORDER — CEFAZOLIN SODIUM/WATER 2 G/20 ML
2 SYRINGE (ML) INTRAVENOUS SEE ADMIN INSTRUCTIONS
Status: DISCONTINUED | OUTPATIENT
Start: 2023-02-21 | End: 2023-02-21 | Stop reason: HOSPADM

## 2023-02-21 RX ORDER — OXYCODONE HYDROCHLORIDE 10 MG/1
10 TABLET ORAL EVERY 4 HOURS PRN
Status: DISCONTINUED | OUTPATIENT
Start: 2023-02-21 | End: 2023-02-21 | Stop reason: HOSPADM

## 2023-02-21 RX ORDER — ONDANSETRON 4 MG/1
4 TABLET, ORALLY DISINTEGRATING ORAL EVERY 30 MIN PRN
Status: DISCONTINUED | OUTPATIENT
Start: 2023-02-21 | End: 2023-02-21 | Stop reason: HOSPADM

## 2023-02-21 RX ORDER — KETAMINE HYDROCHLORIDE 10 MG/ML
INJECTION INTRAMUSCULAR; INTRAVENOUS PRN
Status: DISCONTINUED | OUTPATIENT
Start: 2023-02-21 | End: 2023-02-21

## 2023-02-21 RX ORDER — ONDANSETRON 2 MG/ML
4 INJECTION INTRAMUSCULAR; INTRAVENOUS EVERY 30 MIN PRN
Status: DISCONTINUED | OUTPATIENT
Start: 2023-02-21 | End: 2023-02-21 | Stop reason: HOSPADM

## 2023-02-21 RX ORDER — PROPOFOL 10 MG/ML
INJECTION, EMULSION INTRAVENOUS PRN
Status: DISCONTINUED | OUTPATIENT
Start: 2023-02-21 | End: 2023-02-21

## 2023-02-21 RX ORDER — FENTANYL CITRATE 50 UG/ML
INJECTION, SOLUTION INTRAMUSCULAR; INTRAVENOUS PRN
Status: DISCONTINUED | OUTPATIENT
Start: 2023-02-21 | End: 2023-02-21

## 2023-02-21 RX ORDER — FENTANYL CITRATE 50 UG/ML
25 INJECTION, SOLUTION INTRAMUSCULAR; INTRAVENOUS EVERY 5 MIN PRN
Status: DISCONTINUED | OUTPATIENT
Start: 2023-02-21 | End: 2023-02-21 | Stop reason: HOSPADM

## 2023-02-21 RX ORDER — NALOXONE HYDROCHLORIDE 0.4 MG/ML
0.4 INJECTION, SOLUTION INTRAMUSCULAR; INTRAVENOUS; SUBCUTANEOUS
Status: DISCONTINUED | OUTPATIENT
Start: 2023-02-21 | End: 2023-02-22 | Stop reason: HOSPADM

## 2023-02-21 RX ORDER — GINSENG 100 MG
CAPSULE ORAL EVERY 8 HOURS
Status: DISCONTINUED | OUTPATIENT
Start: 2023-02-21 | End: 2023-02-22 | Stop reason: HOSPADM

## 2023-02-21 RX ORDER — SODIUM CHLORIDE, SODIUM LACTATE, POTASSIUM CHLORIDE, CALCIUM CHLORIDE 600; 310; 30; 20 MG/100ML; MG/100ML; MG/100ML; MG/100ML
INJECTION, SOLUTION INTRAVENOUS CONTINUOUS
Status: DISCONTINUED | OUTPATIENT
Start: 2023-02-21 | End: 2023-02-22

## 2023-02-21 RX ORDER — LISINOPRIL 10 MG/1
10 TABLET ORAL DAILY
Status: DISCONTINUED | OUTPATIENT
Start: 2023-02-22 | End: 2023-02-22 | Stop reason: HOSPADM

## 2023-02-21 RX ORDER — HYDROMORPHONE HCL IN WATER/PF 6 MG/30 ML
0.2 PATIENT CONTROLLED ANALGESIA SYRINGE INTRAVENOUS
Status: DISCONTINUED | OUTPATIENT
Start: 2023-02-21 | End: 2023-02-22

## 2023-02-21 RX ORDER — HYDROMORPHONE HCL IN WATER/PF 6 MG/30 ML
0.2 PATIENT CONTROLLED ANALGESIA SYRINGE INTRAVENOUS EVERY 5 MIN PRN
Status: DISCONTINUED | OUTPATIENT
Start: 2023-02-21 | End: 2023-02-21 | Stop reason: HOSPADM

## 2023-02-21 RX ORDER — DEXAMETHASONE SODIUM PHOSPHATE 4 MG/ML
10 INJECTION, SOLUTION INTRA-ARTICULAR; INTRALESIONAL; INTRAMUSCULAR; INTRAVENOUS; SOFT TISSUE ONCE
Status: DISCONTINUED | OUTPATIENT
Start: 2023-02-21 | End: 2023-02-21 | Stop reason: HOSPADM

## 2023-02-21 RX ORDER — CEFAZOLIN SODIUM/WATER 2 G/20 ML
2 SYRINGE (ML) INTRAVENOUS
Status: COMPLETED | OUTPATIENT
Start: 2023-02-21 | End: 2023-02-21

## 2023-02-21 RX ORDER — DEXAMETHASONE SODIUM PHOSPHATE 4 MG/ML
INJECTION, SOLUTION INTRA-ARTICULAR; INTRALESIONAL; INTRAMUSCULAR; INTRAVENOUS; SOFT TISSUE PRN
Status: DISCONTINUED | OUTPATIENT
Start: 2023-02-21 | End: 2023-02-21

## 2023-02-21 RX ORDER — ACETAMINOPHEN 325 MG/1
650 TABLET ORAL EVERY 4 HOURS
Status: DISCONTINUED | OUTPATIENT
Start: 2023-02-21 | End: 2023-02-22 | Stop reason: HOSPADM

## 2023-02-21 RX ORDER — LIDOCAINE 40 MG/G
CREAM TOPICAL
Status: DISCONTINUED | OUTPATIENT
Start: 2023-02-21 | End: 2023-02-22 | Stop reason: HOSPADM

## 2023-02-21 RX ORDER — PHENYLEPHRINE HCL IN 0.9% NACL 50MG/250ML
.5-1.25 PLASTIC BAG, INJECTION (ML) INTRAVENOUS CONTINUOUS
Status: DISCONTINUED | OUTPATIENT
Start: 2023-02-21 | End: 2023-02-21 | Stop reason: HOSPADM

## 2023-02-21 RX ORDER — ONDANSETRON 2 MG/ML
INJECTION INTRAMUSCULAR; INTRAVENOUS PRN
Status: DISCONTINUED | OUTPATIENT
Start: 2023-02-21 | End: 2023-02-21

## 2023-02-21 RX ORDER — METOPROLOL TARTRATE 1 MG/ML
1-2 INJECTION, SOLUTION INTRAVENOUS EVERY 5 MIN PRN
Status: DISCONTINUED | OUTPATIENT
Start: 2023-02-21 | End: 2023-02-21 | Stop reason: HOSPADM

## 2023-02-21 RX ORDER — LIDOCAINE HYDROCHLORIDE AND EPINEPHRINE 10; 10 MG/ML; UG/ML
INJECTION, SOLUTION INFILTRATION; PERINEURAL PRN
Status: DISCONTINUED | OUTPATIENT
Start: 2023-02-21 | End: 2023-02-21 | Stop reason: HOSPADM

## 2023-02-21 RX ORDER — POLYETHYLENE GLYCOL 3350 17 G/17G
17 POWDER, FOR SOLUTION ORAL DAILY
Status: DISCONTINUED | OUTPATIENT
Start: 2023-02-22 | End: 2023-02-22 | Stop reason: HOSPADM

## 2023-02-21 RX ORDER — SENNOSIDES 8.6 MG
8.6 TABLET ORAL DAILY
Status: DISCONTINUED | OUTPATIENT
Start: 2023-02-22 | End: 2023-02-22 | Stop reason: HOSPADM

## 2023-02-21 RX ORDER — LIDOCAINE HYDROCHLORIDE 20 MG/ML
INJECTION, SOLUTION INFILTRATION; PERINEURAL PRN
Status: DISCONTINUED | OUTPATIENT
Start: 2023-02-21 | End: 2023-02-21

## 2023-02-21 RX ADMIN — Medication 10 MG: at 09:13

## 2023-02-21 RX ADMIN — DEXMEDETOMIDINE 4 MCG: 100 INJECTION, SOLUTION, CONCENTRATE INTRAVENOUS at 12:33

## 2023-02-21 RX ADMIN — Medication 10 MG: at 11:12

## 2023-02-21 RX ADMIN — FENTANYL CITRATE 50 MCG: 50 INJECTION, SOLUTION INTRAMUSCULAR; INTRAVENOUS at 08:02

## 2023-02-21 RX ADMIN — DEXMEDETOMIDINE 4 MCG: 100 INJECTION, SOLUTION, CONCENTRATE INTRAVENOUS at 11:58

## 2023-02-21 RX ADMIN — CALCIUM CARBONATE (ANTACID) CHEW TAB 500 MG 2500 MG: 500 CHEW TAB at 20:04

## 2023-02-21 RX ADMIN — PHENYLEPHRINE HYDROCHLORIDE 100 MCG: 10 INJECTION INTRAVENOUS at 12:50

## 2023-02-21 RX ADMIN — DEXMEDETOMIDINE 4 MCG: 100 INJECTION, SOLUTION, CONCENTRATE INTRAVENOUS at 12:08

## 2023-02-21 RX ADMIN — PROPOFOL 50 MG: 10 INJECTION, EMULSION INTRAVENOUS at 08:07

## 2023-02-21 RX ADMIN — REMIFENTANIL HYDROCHLORIDE 0.1 MCG/KG/MIN: 1 INJECTION, POWDER, LYOPHILIZED, FOR SOLUTION INTRAVENOUS at 08:29

## 2023-02-21 RX ADMIN — PHENYLEPHRINE HYDROCHLORIDE 200 MCG: 10 INJECTION INTRAVENOUS at 08:28

## 2023-02-21 RX ADMIN — DEXMEDETOMIDINE 4 MCG: 100 INJECTION, SOLUTION, CONCENTRATE INTRAVENOUS at 10:46

## 2023-02-21 RX ADMIN — DEXMEDETOMIDINE 4 MCG: 100 INJECTION, SOLUTION, CONCENTRATE INTRAVENOUS at 12:30

## 2023-02-21 RX ADMIN — Medication 2 G: at 12:12

## 2023-02-21 RX ADMIN — BACITRACIN: 500 OINTMENT TOPICAL at 17:17

## 2023-02-21 RX ADMIN — HYDROMORPHONE HYDROCHLORIDE 0.5 MG: 1 INJECTION, SOLUTION INTRAMUSCULAR; INTRAVENOUS; SUBCUTANEOUS at 12:45

## 2023-02-21 RX ADMIN — PROPOFOL 50 MG: 10 INJECTION, EMULSION INTRAVENOUS at 12:06

## 2023-02-21 RX ADMIN — DEXMEDETOMIDINE 4 MCG: 100 INJECTION, SOLUTION, CONCENTRATE INTRAVENOUS at 10:54

## 2023-02-21 RX ADMIN — FENTANYL CITRATE 50 MCG: 50 INJECTION, SOLUTION INTRAMUSCULAR; INTRAVENOUS at 08:09

## 2023-02-21 RX ADMIN — PHENYLEPHRINE HYDROCHLORIDE 200 MCG: 10 INJECTION INTRAVENOUS at 08:45

## 2023-02-21 RX ADMIN — SUCCINYLCHOLINE CHLORIDE 100 MG: 20 INJECTION, SOLUTION INTRAMUSCULAR; INTRAVENOUS; PARENTERAL at 08:02

## 2023-02-21 RX ADMIN — Medication 2 G: at 08:14

## 2023-02-21 RX ADMIN — DEXMEDETOMIDINE 8 MCG: 100 INJECTION, SOLUTION, CONCENTRATE INTRAVENOUS at 12:49

## 2023-02-21 RX ADMIN — ACETAMINOPHEN 650 MG: 325 TABLET ORAL at 17:16

## 2023-02-21 RX ADMIN — Medication 0.5 MCG/KG/MIN: at 08:43

## 2023-02-21 RX ADMIN — SODIUM CHLORIDE, POTASSIUM CHLORIDE, SODIUM LACTATE AND CALCIUM CHLORIDE: 600; 310; 30; 20 INJECTION, SOLUTION INTRAVENOUS at 10:38

## 2023-02-21 RX ADMIN — PROPOFOL 50 MG: 10 INJECTION, EMULSION INTRAVENOUS at 12:01

## 2023-02-21 RX ADMIN — SODIUM CHLORIDE, SODIUM LACTATE, POTASSIUM CHLORIDE, CALCIUM CHLORIDE: 600; 310; 30; 20 INJECTION, SOLUTION INTRAVENOUS at 11:05

## 2023-02-21 RX ADMIN — DEXAMETHASONE SODIUM PHOSPHATE 10 MG: 4 INJECTION, SOLUTION INTRA-ARTICULAR; INTRALESIONAL; INTRAMUSCULAR; INTRAVENOUS; SOFT TISSUE at 08:15

## 2023-02-21 RX ADMIN — Medication 10 MG: at 12:08

## 2023-02-21 RX ADMIN — PROPOFOL 150 MG: 10 INJECTION, EMULSION INTRAVENOUS at 08:02

## 2023-02-21 RX ADMIN — SODIUM CHLORIDE, SODIUM LACTATE, POTASSIUM CHLORIDE, CALCIUM CHLORIDE: 600; 310; 30; 20 INJECTION, SOLUTION INTRAVENOUS at 08:27

## 2023-02-21 RX ADMIN — PHENYLEPHRINE HYDROCHLORIDE 200 MCG: 10 INJECTION INTRAVENOUS at 08:39

## 2023-02-21 RX ADMIN — Medication 30 MG: at 08:13

## 2023-02-21 RX ADMIN — SODIUM CHLORIDE, POTASSIUM CHLORIDE, SODIUM LACTATE AND CALCIUM CHLORIDE: 600; 310; 30; 20 INJECTION, SOLUTION INTRAVENOUS at 07:53

## 2023-02-21 RX ADMIN — ACETAMINOPHEN 650 MG: 325 TABLET ORAL at 20:56

## 2023-02-21 RX ADMIN — Medication 10 MG: at 10:16

## 2023-02-21 RX ADMIN — PHENYLEPHRINE HYDROCHLORIDE 100 MCG: 10 INJECTION INTRAVENOUS at 13:01

## 2023-02-21 RX ADMIN — SODIUM CHLORIDE, POTASSIUM CHLORIDE, SODIUM LACTATE AND CALCIUM CHLORIDE: 600; 310; 30; 20 INJECTION, SOLUTION INTRAVENOUS at 17:16

## 2023-02-21 RX ADMIN — PHENYLEPHRINE HYDROCHLORIDE 200 MCG: 10 INJECTION INTRAVENOUS at 09:28

## 2023-02-21 RX ADMIN — MIDAZOLAM 2 MG: 1 INJECTION INTRAMUSCULAR; INTRAVENOUS at 07:50

## 2023-02-21 RX ADMIN — CALCITRIOL CAPSULES 0.25 MCG 0.25 MCG: 0.25 CAPSULE ORAL at 20:04

## 2023-02-21 RX ADMIN — PROPOFOL 50 MG: 10 INJECTION, EMULSION INTRAVENOUS at 08:43

## 2023-02-21 RX ADMIN — ONDANSETRON 4 MG: 2 INJECTION INTRAMUSCULAR; INTRAVENOUS at 12:45

## 2023-02-21 RX ADMIN — PROPOFOL 50 MG: 10 INJECTION, EMULSION INTRAVENOUS at 11:42

## 2023-02-21 RX ADMIN — PROPOFOL 50 MG: 10 INJECTION, EMULSION INTRAVENOUS at 12:14

## 2023-02-21 RX ADMIN — LIDOCAINE HYDROCHLORIDE 100 MG: 20 INJECTION, SOLUTION INFILTRATION; PERINEURAL at 08:02

## 2023-02-21 ASSESSMENT — ACTIVITIES OF DAILY LIVING (ADL)
ADLS_ACUITY_SCORE: 35

## 2023-02-21 ASSESSMENT — ENCOUNTER SYMPTOMS: SEIZURES: 0

## 2023-02-21 NOTE — ANESTHESIA CARE TRANSFER NOTE
Patient: Gordon Stack    Procedure: Procedure(s):  THYROIDECTOMY, TOTAL with right neck dissection; parathyroid reimplantation x2       Diagnosis: Papillary thyroid carcinoma (H) [C73]  Diagnosis Additional Information: No value filed.    Anesthesia Type:   General     Note:    Oropharynx: oropharynx clear of all foreign objects and spontaneously breathing  Level of Consciousness: drowsy  Oxygen Supplementation: face mask  Level of Supplemental Oxygen (L/min / FiO2): 6  Independent Airway: airway patency satisfactory and stable  Dentition: dentition unchanged  Vital Signs Stable: post-procedure vital signs reviewed and stable  Report to RN Given: handoff report given  Patient transferred to: PACU    Handoff Report: Identifed the Patient, Identified the Reponsible Provider, Reviewed the pertinent medical history, Discussed the surgical course, Reviewed Intra-OP anesthesia mangement and issues during anesthesia, Set expectations for post-procedure period and Allowed opportunity for questions and acknowledgement of understanding      Vitals:  Vitals Value Taken Time   /69 02/21/23 1323   Temp 38.1    Pulse 106 02/21/23 1326   Resp 19 02/21/23 1326   SpO2 97 % 02/21/23 1326   Vitals shown include unvalidated device data.    Electronically Signed By: ROLAND BECK CRNA  February 21, 2023  1:27 PM   30 day sent

## 2023-02-21 NOTE — BRIEF OP NOTE
[facility]    Brief Operative Note    Pre-operative diagnosis: Papillary thyroid carcinoma (H) [C73]  Post-operative diagnosis Same as pre-operative diagnosis    Procedure: Procedure(s):  THYROIDECTOMY, TOTAL with right neck dissection; parathyroid reimplantation x2  Surgeon: Surgeon(s) and Role:     * Prema Krishnamurthy MD - Primary     * Maximiliano Rodriguez MD - Resident - Assisting     * Anthony Felix MD - Resident - Observing  Anesthesia: General   Estimated Blood Loss: 30    Drains: FRANCISCO JAVIER x2   Specimens:   ID Type Source Tests Collected by Time Destination   1 : Delphian node Tissue Other SURGICAL PATHOLOGY EXAM Prema Krishnamurthy MD 2/21/2023  8:53 AM    2 : Rule out thyroid mets vs parathyroid gland Tissue Other SURGICAL PATHOLOGY EXAM Prema Krishnamurthy MD 2/21/2023 10:02 AM    3 : Total thyroidectomy; stitch on Left Lobe Tissue Thyroid SURGICAL PATHOLOGY EXAM Prema Krishnamurthy MD 2/21/2023 10:40 AM    4 : Left inferior parathyroid biopsy Tissue Other SURGICAL PATHOLOGY EXAM Prema Krishnamurthy MD 2/21/2023 10:43 AM    5 : Right inferior parathyroid biopsy Tissue Other SURGICAL PATHOLOGY EXAM Prema Krishnamurthy MD 2/21/2023 10:43 AM    6 : Right Level 2A Tissue Other SURGICAL PATHOLOGY EXAM Prema Krishnamurthy MD 2/21/2023 12:11 PM    7 : Right Level 3 Tissue Other SURGICAL PATHOLOGY EXAM Prema Krishnamurthy MD 2/21/2023 12:11 PM    8 : Right Level 4 Tissue Other SURGICAL PATHOLOGY EXAM Prema Krishnamurthy MD 2/21/2023 12:11 PM    9 : Right Level 6 Tissue Other SURGICAL PATHOLOGY EXAM Prema Krishnamurthy MD 2/21/2023 12:21 PM    10 : Rule out parathyroid biopsy Tissue Other SURGICAL PATHOLOGY EXAM Prema Krishnamurthy MD 2/21/2023 12:35 PM      Findings:  Large right thyroid nodule. Left parathyroids re-implanted. Right parathryoids preserved in place.   Complications: None.  Implants: * No implants in log *      Neuro: tylenol, oxycodone, dilaudid    HEENT: Incision & FRANCISCO JAVIER drain cares   Respiratory: CHEKO, supplemental O2 PRN   CV/heme: CHEKO, PTA lisinopril   FEN/GI: regular diet, bowel regimen   : CHEKO   Endo: PTA spironolactone   - post-op PTH and iCal, q8h iCal   - TID tums, BID calcitriol, daily Synthroid 125 mcg   ID: CHEKO   PPX: SCDS   Therapies:  OT for shoulder exercises   Dispo: anticipate home tmrw

## 2023-02-21 NOTE — OP NOTE
Date of Procedure: 2/21/2023    Attending Physician: Prema Krishnamurthy MD    Resident Physicians: Kellie Felix MD    Procedure Performed:  Total thyroidectomy  Right neck dissection levels II-IV, VI  Left parathyroid reimplantation x 2    Preoperative Diagnosis:   1. Papillary thyroid cancer  2. Secondary malignancy of lymph nodes    Postoperative Diagnosis: same    Anesthesia: General    Blood loss: 30 ml     Specimens:   ID Type Source Tests Collected by Time Destination   1 : Delphian node Tissue Other SURGICAL PATHOLOGY EXAM Prema Krishnamurthy MD 2/21/2023  8:53 AM     2 : Rule out thyroid mets vs parathyroid gland Tissue Other SURGICAL PATHOLOGY EXAM Prema Krishnamurthy MD 2/21/2023 10:02 AM     3 : Total thyroidectomy; stitch on Left Lobe Tissue Thyroid SURGICAL PATHOLOGY EXAM Prema Krishnamurthy MD 2/21/2023 10:40 AM     4 : Left inferior parathyroid biopsy Tissue Other SURGICAL PATHOLOGY EXAM Prema Krishnamurthy MD 2/21/2023 10:43 AM     5 : Right inferior parathyroid biopsy Tissue Other SURGICAL PATHOLOGY EXAM Prema Krishnamurthy MD 2/21/2023 10:43 AM     6 : Right Level 2A Tissue Other SURGICAL PATHOLOGY EXAM Prema Krishnamurthy MD 2/21/2023 12:11 PM     7 : Right Level 3 Tissue Other SURGICAL PATHOLOGY EXAM Prema Krishnamurthy MD 2/21/2023 12:11 PM     8 : Right Level 4 Tissue Other SURGICAL PATHOLOGY EXAM Prema Krishnamurthy MD 2/21/2023 12:11 PM     9 : Right Level 6 Tissue Other SURGICAL PATHOLOGY EXAM Prema Krishnamruthy MD 2/21/2023 12:21 PM     10 : Rule out parathyroid biopsy Tissue Other SURGICAL PATHOLOGY EXAM Prema Krishnamurthy MD 2/21/2023 12:35 PM          Implants:  FRANCISCO JAVIER drain x 2    Complications: None    Findings:  Large right thyroid lobe  Right superior and inferior parathyroid glands identified and preserved  Right level VI metastatic node - confirmed on frozen section  Left superior and inferior parathyroid glands devascularized - reimplanted into left SCM and marked with surgical clips and  prolene suture  Bilateral recurrent laryngeal nerves preserved with stimulation at end of case at 0.5 mA with movement on the monitor and on palpation of the PCA muscles  Multiple small nodes in right level II and III  Preservation of greater auricular nerve, spinal accessory nerve, hypoglossal nerve, phrenic nerve, vagus nerve      Indications:  Gordon Stack is a 42 year old with a papillary thyroid cancer. She saw her PCP in November 2022 with a neck mass, persisted and had an U/S in December 2022. U/S thyroid showed 5.2 x 4.6 x 4 cm right inferior thyroid nodule TIRADS 4, 0.7 x 0.8 x 0.5 cm left superior thyroid nodule TIRADS 4, 1 x 0.9 x 0.8 cm left superior thyroid nodule TIRADS 3. She had a CT neck which showed 4.8 x 4.1 x 5.4 cm right thyroid nodule, no lymphadenopathy. She had an U/S guided FNA of the right thyroid nodule consistent with a papillary thyroid cancer. She had a dedicated neck U/S in preparation for surgery which showed a concerning right level III node. U/S guided FNA was performed and was consistent with metastatic thyroid cancer. She is indicated for total thyroidectomy and right neck dissection.      Description of Procedure:  After informed consent was obtained, the patient was brought back to the main operating room and placed in a supine position.  A shoulder roll was placed. General anesthesia was induced and the patient was orotracheally intubated with a NIMS endotracheal tube.  Positioning was verified with the glidescope. The bed was turned 180 degrees from anesthesia. A vargas was placed.  The planned incision was marked at the level of the cricoid and then extended laterally into the right neck along a natural skin crease approximately 2 fingerbreadths below the mandible. The planned incision was injected with 1% lidocaine with 1:100,000 epinephrine.  The patient was then prepped and draped in usual fashion.  A timeout was performed.     A 15 blade was used to make an incision  through the skin. The incision was started at the thyroid and extending back toward the posterior border of the SCM up toward the mastoid. The monopolar cautery was used to divide the platysma and subplatysmal flaps were raised superiorly to the mandible and inferiorly to the clavicle laterally. During the raising of the flaps the greater auricular nerve and external jugular vein were preserved. The incision was extended down to the strap muscles medially.  Skin flaps were raised superiorly to the thyroid notch at the midline, to the mandible laterally, and inferiorly to the clavicle at the midline.  The anterior jugular veins were preserved.     The midline raphae was identified between the strap muscles and divided.  While dissecting in this plane, the fibrofatty contents between the muscles was bluntly dissected and handed off to nursing for permanent pathology as part of the pretracheal level VI. This dissection was carried down to the trachea. This specimen was handed off to nursing for permanent pathology. The sternohyoid muscle was identified and retracted laterally on the right.  Blunt dissection was performed to release the sternothyroid muscle off of the anterior surface of the thyroid gland. There was an area along the anterior thyroid where the muscle would not cleanly separate from the gland so a cuff was left in place to be taken with the thyroid. Kitner dissection was performed along the anterior and lateral borders of the thyroid gland so that the entire surface of the gland could be viewed. The gland was enlarged consistent with preoperative imaging. The dissection on the right thyroid lobe was started laterally, taking down the middle thyroid veins. The dissection was extended superiorly toward the superior pole along the lateral border. The superior pole vessels were taken down, taking care to clip and divide the pedicle.  During the dissection along the superior pole as the gland was retracted  medially the superior parathyroid gland was identified and preserved. The dissection was carried anterior at the junction of the thyroid gland along the cricothyroid muscle.  Dissection was carried along the thyroid capsule working from superior to inferior along the lateral border.  We then continued to release along the inferior aspect of the gland.  Care was taken to clip the vessels near the gland.  The right thyroid lobe was retracted medially towards the trachea.  At this point the capsule was released along the thyroid gland towards the cricothyroid joint. Blunt dissection was performed with the Renetta dissector in order to identify the recurrent laryngeal nerve.  This was traced towards the cricothyroid joint.  The identity of the nerve was verified using the nerve stimulator.  With the nerve in view the remainder of the gland was able to be released along the lateral border of the trachea. The right thyroid lobe was then released off the trachea at Gallardo's ligament.  The recurrent laryngeal nerve was tested at 0.5 mA with stimulation noted on the monitor and on palpation of the PCA muscles. There was what appeared to be a metastatic node vs devascularized inferior parathyroid gland lateral to the recurrent laryngeal nerve. This was biopsied and sent for frozen section. The tissue sample was small with crush artifact which limited evaluation. A repeat biopsy was taken and this was consistent with metastatic thyroid cancer.      We then turned our attention to the left side of the thyroid gland.  The sternohyoid muscle was released from the sternothyroid muscle and retracted laterally.  The fascia along the medial border of the sternothyroid was released and the sternothyroid muscle was bluntly dissected off the anterior surface of the thyroid gland.  Kitners were used to dissect along the borders of the thyroid lobe on the left side out towards the carotid sheath and towards the superior pole.  The thyroid  isthmus was released off the anterior surface of the trachea using the bipolar cautery.  There was no obvious pyramidal lobe present. We then started to use the Jackson dissector to release the thyroid gland at its junction with the cricothyroid muscle.  We continued to dissect along the superior pole, taking down the superior pedicle.  Blunt dissection was then performed with the Jackson dissector and bipolar cautery to release the lateral border of the thyroid gland.  The inferior pole was taken down near the gland.  The left thyroid lobe was retracted medially towards the trachea. The superior parathyroid gland was identified and preserved. At this point the Jackson dissector was used to bluntly dissect to identify the recurrent laryngeal nerve.  The identity of the nerve was verified using the nerve stimulator.  This was traced towards the cricothyroid joint. The remainder of the thyroid gland was released off the lateral border of the trachea.  Once the gland was completely released it was handed off to nursing for permanent pathology after orientation sutures were placed.  The recurrent laryngeal nerve on the left side was tested at 0.5 mA with movement noted on the monitor and on palpation of the PCA muscle. The superior parathyroid was reinspected and appeared devascularized. A small piece was sent for frozen section and was positive for parathyroid gland. The inferior parathyroid similarly appeared devascularized and was biopsied and was sent for frozen section. Both the inferior and superior parathyroid on the left were placed in saline on the back table. Ultimately both biopsies were positive for parathyroid tissue.     Attention was turned back to the right side. The Jackson dissector was used to trace the recurrent laryngeal nerve from the cricothyroid joint down toward the clavicle. The nerve was carefully freed circumferentially. The Jackson dissector was used to start the dissection of level VI on the  right side, freeing the contents from around the recurrent laryngeal nerve and then working medially toward the trachea.The dissection was extended laterally toward the carotid sheath. A few small nodes were identified in the specimen. The previously biopsied positive node was included in the specimen. The specimen was carefully inspected with no obvious parathyroid gland identified. The specimen was handed off to nursing for permanent pathology. Hemostasis was achieved in the thyroid bed.      We then turned our attention to the right lateral neck dissection. The inferior border of the submandibular gland was identified and released. The digastric was visualized and traced back toward the mastoid tip. The anterior border of the SCM was identified and the fascia was released along the medial border of the SCM toward the floor of the neck. The fascia of the SCM was released from the level of the digastric superiorly down to the level of the clavicle inferiorly towards the floor of the neck. The patient had several small jugulodigastric nodes present in level II. The spinal accessory nerve was identified.  The lateral border of the internal jugular vein was identified superiorly. The cervical rootlets were identified along the floor of the neck, although were small in diameter. The omohyoid muscle was freed circumferentially and retracted inferiorly to allow access to level IV. The medial border of our dissection was defined along the strap muscles.  The fascia along the medial border of the omohyoid was released from the level of the clavicle up towards the digastric. The medial contents of the neck were released back toward the medial border of the internal jugular vein.  Inferiorly the lateral border of the internal jugular vein was identified and the fibrofatty contents of level IV were released above the level of the clavicle. The transverse cervical vessels were preserved. The monopolar cautery was then used to  release the fibrofatty contents of levels II, III, IV from the cervical rootlets towards the carotid sheath.  The specimen was then dissected off the carotid sheath and the lateral border of the internal jugular vein.  The specimen was then released off the anterior surface of the internal jugular vein, taking care to preserve the facial vein.  The dissection was connected to the medial aspect of the dissection. The specimen was divided into its respective levels and handed off to nursing for permanent pathology.       The neck was thoroughly irrigated with a liter of saline. Careful hemostasis was achieved with bipolar cautery. Multiple sustained valsalva maneuvers were performed to ensure hemostasis in all areas of the neck and thyroid bed. The right superior and inferior parathyroid glands were preserved. The left superior and inferior parathyroid glands were morselized and reimplanted into the left SCM muscle. This was marked with small clips and prolene sutures.  A 10 mm flat FRANCISCO JAVIER drain was placed in the lateral neck and secured with a 3-0 nylon (lateral drain). A 10 mm flat FRANCISCO JAVIER drain was placed in the thyroid bed and secured to the skin with a 3-0 nylon (medial drain). The strap muscles were reapproximated at the midline using a 3-0 Vicryl in horizontal mattress fashion. The skin was closed with a buried interrupted 3-0 Vicryl followed by a running 5-0 Prolene. Bacitracin was applied to the incision. The patient was turned back to anesthesia for extubation and then transported to the recovery room in stable condition with no immediate complications. She had no evidence of stridor on extubation and no neck hematoma.     I was present for and participated in the entire procedure.      Prema Krishnamurthy MD    Department of Otolaryngology

## 2023-02-21 NOTE — ANESTHESIA POSTPROCEDURE EVALUATION
Patient: Gordon Stack    Procedure: Procedure(s):  THYROIDECTOMY, TOTAL with right neck dissection; parathyroid reimplantation x2       Anesthesia Type:  General    Note:  Disposition: Admission   Postop Pain Control: Uneventful            Sign Out: Well controlled pain   PONV: No   Neuro/Psych: Uneventful            Sign Out: Acceptable/Baseline neuro status   Airway/Respiratory: Uneventful            Sign Out: Acceptable/Baseline resp. status   CV/Hemodynamics: Uneventful            Sign Out: Acceptable CV status; No obvious hypovolemia; No obvious fluid overload   Other NRE: NONE   DID A NON-ROUTINE EVENT OCCUR? No           Last vitals:  Vitals Value Taken Time   /85 02/21/23 1445   Temp 36.7  C (98.1  F) 02/21/23 1322   Pulse 98 02/21/23 1453   Resp 4 02/21/23 1453   SpO2 94 % 02/21/23 1453   Vitals shown include unvalidated device data.    Electronically Signed By: Enma Jasso MD  February 21, 2023  2:55 PM

## 2023-02-21 NOTE — ANESTHESIA PREPROCEDURE EVALUATION
Anesthesia Pre-Procedure Evaluation    Patient: Gordon Stack   MRN: [id number] : 1980        Procedure : Procedure(s):  THYROIDECTOMY, TOTAL with right neck dissection          Past Medical History:   Diagnosis Date     Allergic state       Past Surgical History:   Procedure Laterality Date     IR FINE NEEDLE ASPIRATION W ULTRASOUND  2023      No Known Allergies   Social History     Tobacco Use     Smoking status: Some Days     Smokeless tobacco: Never   Substance Use Topics     Alcohol use: Yes     Comment: occ      Wt Readings from Last 1 Encounters:   23 112.7 kg (248 lb 7.3 oz)        Anesthesia Evaluation   Pt has not had prior anesthetic         ROS/MED HX  ENT/Pulmonary:       Neurologic:    (-) no seizures and no CVA   Cardiovascular:     (+) Dyslipidemia hypertension-----    METS/Exercise Tolerance:     Hematologic:       Musculoskeletal:       GI/Hepatic:       Renal/Genitourinary:       Endo:     (+) thyroid problem,     Psychiatric/Substance Use:       Infectious Disease:       Malignancy:   (+) Malignancy, History of Other.Other CA papillary thyroid ca Active status post.    Other: Comment: Gender dysphoria           Physical Exam    Airway        Mallampati: I   TM distance: > 3 FB   Neck ROM: full   Mouth opening: > 3 cm    Respiratory Devices and Support         Dental       (+) Minor Abnormalities - some fillings, tiny chips      Cardiovascular          Rhythm and rate: regular and normal     Pulmonary                   OUTSIDE LABS:  CBC:   Lab Results   Component Value Date    WBC 9.4 2023    WBC 12.0 (H) 2022    HGB 12.8 2023    HGB 13.2 2022    HCT 40.0 2023    HCT 40.6 2022     2023     2022     BMP:   Lab Results   Component Value Date     2023     2022    POTASSIUM 4.0 2023    POTASSIUM 4.1 2022    CHLORIDE 104 2023    CHLORIDE 100 2022    CO2 21 (L) 2023     CO2 21 (L) 11/11/2022    BUN 9.0 02/14/2023    BUN 11.2 11/11/2022    CR 0.80 02/14/2023    CR 0.78 11/11/2022     (H) 02/21/2023    GLC 77 02/14/2023     COAGS:   Lab Results   Component Value Date    INR 1.02 02/13/2023     POC: No results found for: BGM, HCG, HCGS  HEPATIC:   Lab Results   Component Value Date    ALBUMIN 4.6 11/11/2022    PROTTOTAL 7.5 11/11/2022    ALT 19 11/11/2022    AST 24 11/11/2022    ALKPHOS 59 11/11/2022    BILITOTAL 0.3 11/11/2022     OTHER:   Lab Results   Component Value Date    A1C 5.5 09/29/2020    HEATHER 10.0 02/14/2023    LIPASE 100 08/28/2016    TSH 1.27 11/21/2022    T4 1.37 11/21/2022    T3 134 11/21/2022       Anesthesia Plan    ASA Status:  2   NPO Status:  NPO Appropriate    Anesthesia Type: General.     - Airway: ETT   Induction: Intravenous.   Maintenance: Balanced.   Techniques and Equipment:     - Lines/Monitors: 2nd IV     Consents    Anesthesia Plan(s) and associated risks, benefits, and realistic alternatives discussed. Questions answered and patient/representative(s) expressed understanding.    - Discussed:     - Discussed with:  Patient         Postoperative Care    Pain management: IV analgesics.   PONV prophylaxis: Ondansetron (or other 5HT-3), Dexamethasone or Solumedrol     Comments:    Other Comments: Patient would like limited narcotics            Enma Jasso MD

## 2023-02-21 NOTE — ANESTHESIA PROCEDURE NOTES
Airway       Patient location during procedure: OR       Procedure Start/Stop Times: 2/21/2023 8:05 AM  Staff -        CRNA: Nawaf Walker APRN CRNA       Performed By: CRNA  Consent for Airway        Urgency: elective  Indications and Patient Condition       Indications for airway management: hugh-procedural       Induction type:intravenous       Mask difficulty assessment: 2 - vent by mask + OA or adjuvant +/- NMBA    Final Airway Details       Final airway type: endotracheal airway       Successful airway: NIM and Oral  Endotracheal Airway Details        ETT size (mm): 7.0       Cuffed: yes       Successful intubation technique: video laryngoscopy       VL Blade Size: Glidescope 4       Grade View of Cords: 1       Adjucts: stylet       Position: Left       Measured from: gums/teeth       Secured at (cm): 23       Bite block used: None    Post intubation assessment        Placement verified by: capnometry, equal breath sounds and chest rise        Number of attempts at approach: 1       Number of other approaches attempted: 0       Secured with: plastic tape       Ease of procedure: easy       Dentition: Intact and Unchanged (intact/unchanged from prior condition)    Medication(s) Administered   Medication Administration Time: 2/21/2023 8:05 AM    Additional Comments       NIMs tube placement verified/acceptable via surgeon

## 2023-02-22 ENCOUNTER — TELEPHONE (OUTPATIENT)
Dept: OTOLARYNGOLOGY | Facility: CLINIC | Age: 43
End: 2023-02-22
Payer: COMMERCIAL

## 2023-02-22 VITALS
SYSTOLIC BLOOD PRESSURE: 130 MMHG | OXYGEN SATURATION: 95 % | HEART RATE: 73 BPM | TEMPERATURE: 98 F | WEIGHT: 248.46 LBS | DIASTOLIC BLOOD PRESSURE: 82 MMHG | HEIGHT: 73 IN | BODY MASS INDEX: 32.93 KG/M2 | RESPIRATION RATE: 16 BRPM

## 2023-02-22 LAB — CA-I BLD-MCNC: 4.5 MG/DL (ref 4.4–5.2)

## 2023-02-22 PROCEDURE — 36415 COLL VENOUS BLD VENIPUNCTURE: CPT | Performed by: STUDENT IN AN ORGANIZED HEALTH CARE EDUCATION/TRAINING PROGRAM

## 2023-02-22 PROCEDURE — 999N000111 HC STATISTIC OT IP EVAL DEFER: Performed by: OCCUPATIONAL THERAPIST

## 2023-02-22 PROCEDURE — 250N000013 HC RX MED GY IP 250 OP 250 PS 637: Performed by: STUDENT IN AN ORGANIZED HEALTH CARE EDUCATION/TRAINING PROGRAM

## 2023-02-22 PROCEDURE — 82330 ASSAY OF CALCIUM: CPT | Performed by: STUDENT IN AN ORGANIZED HEALTH CARE EDUCATION/TRAINING PROGRAM

## 2023-02-22 RX ORDER — MINERAL OIL/HYDROPHIL PETROLAT
OINTMENT (GRAM) TOPICAL EVERY 8 HOURS
Qty: 50 G | Refills: 1 | Status: SHIPPED | OUTPATIENT
Start: 2023-02-22

## 2023-02-22 RX ORDER — OXYCODONE HYDROCHLORIDE 5 MG/1
5 TABLET ORAL EVERY 4 HOURS PRN
Qty: 20 TABLET | Refills: 0 | Status: SHIPPED | OUTPATIENT
Start: 2023-02-22 | End: 2023-03-29

## 2023-02-22 RX ORDER — CALCITRIOL 0.25 UG/1
0.25 CAPSULE, LIQUID FILLED ORAL 2 TIMES DAILY
Qty: 60 CAPSULE | Refills: 1 | Status: SHIPPED | OUTPATIENT
Start: 2023-02-22 | End: 2023-08-22

## 2023-02-22 RX ORDER — CALCIUM CARBONATE 500 MG/1
5 TABLET, CHEWABLE ORAL 3 TIMES DAILY
Qty: 300 TABLET | Refills: 0 | Status: SHIPPED | OUTPATIENT
Start: 2023-02-22 | End: 2023-03-29

## 2023-02-22 RX ORDER — SENNOSIDES 8.6 MG
1 TABLET ORAL DAILY PRN
Qty: 20 TABLET | Refills: 0 | Status: SHIPPED | OUTPATIENT
Start: 2023-02-22 | End: 2023-03-29

## 2023-02-22 RX ORDER — LEVOTHYROXINE SODIUM 125 UG/1
125 TABLET ORAL
Qty: 60 TABLET | Refills: 3 | Status: SHIPPED | OUTPATIENT
Start: 2023-02-22 | End: 2023-03-30 | Stop reason: DRUGHIGH

## 2023-02-22 RX ADMIN — ACETAMINOPHEN 650 MG: 325 TABLET ORAL at 05:46

## 2023-02-22 RX ADMIN — CALCIUM CARBONATE (ANTACID) CHEW TAB 500 MG 2500 MG: 500 CHEW TAB at 08:04

## 2023-02-22 RX ADMIN — ACETAMINOPHEN 650 MG: 325 TABLET ORAL at 08:04

## 2023-02-22 RX ADMIN — CALCITRIOL CAPSULES 0.25 MCG 0.25 MCG: 0.25 CAPSULE ORAL at 08:04

## 2023-02-22 RX ADMIN — ACETAMINOPHEN 650 MG: 325 TABLET ORAL at 00:40

## 2023-02-22 RX ADMIN — SPIRONOLACTONE 100 MG: 100 TABLET ORAL at 08:05

## 2023-02-22 RX ADMIN — BACITRACIN: 500 OINTMENT TOPICAL at 00:40

## 2023-02-22 RX ADMIN — LEVOTHYROXINE SODIUM 125 MCG: 125 TABLET ORAL at 08:05

## 2023-02-22 RX ADMIN — LISINOPRIL 10 MG: 10 TABLET ORAL at 08:04

## 2023-02-22 RX ADMIN — BACITRACIN: 500 OINTMENT TOPICAL at 08:15

## 2023-02-22 ASSESSMENT — ACTIVITIES OF DAILY LIVING (ADL)
ADLS_ACUITY_SCORE: 35

## 2023-02-22 NOTE — PLAN OF CARE
OBSERVATION GOALS:        Pain tolerated on PO  - MET - managed on scheduled q4 tylenol  Calcium stable postop  - PENDING: still on q8hr Ca+ checks - drawn at 0545, level pending  Independently care for FRANCISCO JAVIER - PENDING      Pt handed outpatient observation handout this morning, unable to add bullet point to education tab.

## 2023-02-22 NOTE — PROGRESS NOTES
OBSERVATION GOALS:      Pain tolerated on PO  - MET  Calcium stable postop  - PENDING: still on q8hr Ca+ checks  Independently care for FRANCISCO JAVIER - PENDING

## 2023-02-22 NOTE — PLAN OF CARE
Status: s/p total thyroidectomy w/ R neck dissection and parathyroid reimplantation x2   Vitals: VSS  Neuros: A/Ox4, intact   IV: PIV SL   Labs/Electrolytes: q8hr Calcium checks - 4.5 this morning   Resp/trach: RA   Diet: regular  Bowel status: LBM PTA   : voiding spontaneously   Skin: neck incision ROSA   Pain: managed with scheduled tylenol   Activity: up ad gali  Plan: potential discharge today

## 2023-02-22 NOTE — PROVIDER NOTIFICATION
"\"FÉLIX Stack rm [id number] ENT    FYI    Pt having new numbness on R jawline and approx 2 fingerwidths underneath R jaw. Does not appear to have increase in swelling on incision line. Thanks Micheal [id number]\"        Paged ENT resident at [phone]      Addendum: ENT resident MARYA Riley called back, acknowledged page, and stated this was expected.     "

## 2023-02-22 NOTE — PLAN OF CARE
OT: after observation, conversation with pt and interdisciplinary team and chart review it is deemed that this pt has no acute OT needs. Pt issued neck and shoulder ex's and after review able to complete appropriately and with no further questions after risk/benefit education. Pt in agreement to compete I. Pt able to self dress without difficulties and is coherent with linear logic. Defer acute OT today.

## 2023-02-22 NOTE — PLAN OF CARE
Arrived from: PACU at 1620   Belongings/meds: remain with pt, cell phone, , clothes, pillow, Nintendo switch, shoes  2 RN Skin Assessment Completed by: Fiorella MERRILL RN and Cheryle MERRILL RN   Non-intact findings documented (yes/no/NA): R neck incision closed with stapes, ROSA with no drainage, neck FRANCISCO JAVIER x2, old scratch on R bicep    Status: POD 0 s/p total thyroidectomy with right neck dissection and parathyroid reimplantation x2  Vitals: VSS on RA  Neuros: AOx4, strengths 5/5, denies n/t  IV: PIV SL x2  Labs/Electrolytes: Ca draw q8hrs. Ca postop was 4.4. 2200 draw pending  Resp/trach: LSC  Diet: regular  Bowel status: LBM PTA, BS+  : void spont  Skin: see above  Pain: c/o minor incisional pain/stiffness, refuses PRN pain meds, taking scheduled tylenol  Activity: SBA GB  Social: friend at bedside  Plan: discharge tomorrow, return OP for FRANCISCO JAVIER drain removal

## 2023-02-22 NOTE — PLAN OF CARE
Vitals: VSS  Neuros: Numbness to right jaw line   IV: Removed   Labs/Electrolytes: WNL  Resp/trach: RA   Diet: regular - Good po   Bowel status: LBM PTA   : voiding spontaneously   Skin: neck incision ROSA, FRANCISCO JAVIER x 2   Pain: managed with scheduled tylenol   Activity: Indep   Plan: Pt is discharging home at this time. Discharge instructions and medications gone over with no further questions. Pt was taught incision and drain cares. Supplies sent home. Pt declined need for hospital transportation, walked to pharmacy and front door where cousin will pick them up.

## 2023-02-22 NOTE — DISCHARGE SUMMARY
Discharge Summary  Gordon Stack  [id number]  1980    Date of Admission: 2/21/2023  Date of Discharge: 2/22/23    Admission Diagnosis:   Papillary thyroid carcinoma (H) [C73]  Secondary malignancy of lymph nodes  Hypertension  Hyperlipidemia    Discharge Diagnosis:   Papillary thyroid carcinoma (H) [C73]  Secondary malignancy of lymph nodes  Hypertension  Hyperlipidemia    Procedures:  Date: 2/21/23  Procedure(s):  THYROIDECTOMY, TOTAL with right neck dissection; parathyroid reimplantation x2    Pathology: Pending     HPI: Gordon Stack is a 42 year old adult with history of U/S guided FNA proven papillary thyroid cancer and concerning right level III node.     It was recommended that she undergo operative intervention and the patient consented to the above procedure after detailed explanation of the risks and benefits of said procedure.    Hospital Course: The patient was admitted to the hospital and underwent the above mentioned procedure. She tolerated the procedure without any intra- or hugh-operative complications. Please see the operative report for full details of the procedure. The patient was admitted for post-operative monitoring. Her postoperative course was uneventful. PTH was low postoperatively but iCal measurements were stable. Pt was seen by OT for shoulder exercises. At discharge, the patient's pain was well controlled, the patient was voiding on her own, was ambulating and tolerating a regular diet.     Discharge Exam:  Vitals:    02/21/23 1659 02/21/23 1721 02/21/23 2321 02/22/23 0728   BP: 117/80 137/82 121/73 130/82   BP Location: Right arm Right arm Left arm Right arm   Cuff Size:       Pulse: 92 86 84 73   Resp: 16 16 16 16   Temp: 98.3  F (36.8  C) 98.4  F (36.9  C) 97.9  F (36.6  C) 98  F (36.7  C)   TempSrc: Oral Axillary Oral Oral   SpO2: 96% 96% 94% 95%   Weight:       Height:           General: A&O x 3, No acute distress  HEENT: HB1/6. Incision is clean, dry, and intact; no  bleeding or hematoma. FRANCICSO JAVIER drain x2 to suction with serosanguinous output.   Respiratory: Breathing non-labored on room air, no stridor, no accessory muscle use.     Discharge Medications:     Medication List        Started      calcitRIOL 0.25 MCG capsule  Commonly known as: ROCALTROL  0.25 mcg, Oral, 2 TIMES DAILY     calcium carbonate 500 MG chewable tablet  Commonly known as: TUMS  2,500 mg, Oral, 3 TIMES DAILY     levothyroxine 125 MCG tablet  Commonly known as: SYNTHROID/LEVOTHROID  125 mcg, Oral, EVERY MORNING BEFORE BREAKFAST     mineral oil-hydrophilic petrolatum external ointment  Topical, EVERY 8 HOURS, Apply to incision.     oxyCODONE 5 MG tablet  Commonly known as: ROXICODONE  5 mg, Oral, EVERY 4 HOURS PRN     sennosides 8.6 MG tablet  Commonly known as: SENOKOT  1 tablet, Oral, DAILY PRN              Discharge Procedure Orders   Reason for your hospital stay   Order Comments: Monitoring calcium after surgery     Activity   Order Comments: No heavy lifting greater than 10 lbs and no strenuous exercise for 2 weeks or until follow up appointment. No driving while taking narcotic pain medications.     Order Specific Question Answer Comments   Is discharge order? Yes      Monitor and record   Order Comments: Follow these instructions to care for your tube: 1. Strip the drain 3 times per day 2. After stripping the drain, take the bulb off suction and dump the contents into the measuring cup. Write this down. 3. Put the bulb back on suction (squeeze it and close the top). Once the drainage is less than 30mL in 24 hours, call our ENT clinic for drain removal. Call [phone] to schedule an appointment to have drain removed.     When to contact your care team   Order Comments: Please notify your doctor if you experience wound breakdown, sustained bleeding from the wound site, or increasing redness, swelling, and/or purulent malorodorous discharge from the wound site which may indicate infection. If you feel it  "is acute, or experience sudden changes in breathing, chest pain, or excessive sleepiness/somnolence please return to the emergency department or call 911. If you have questions or concerns during the day please call ENT clinic and [phone]. If at night you can call [facility] at [phone] and ask for the \"ENT resident on call\".     Tubes and drains   Order Comments: You are going home with two drains in your neck     Discharge Instructions   Order Comments: Starting next Wednesday (3/1) you can decrease the tums to five tablets only twice a day. Do this for one week. The following Wednesday (3/8) you can decrease again to 5 tablets once a day. After a week (3/15) you can stop taking tums.     Follow Up and recommended labs and tests   Order Comments: Follow up in ENT clinic with Dr. Krishnamurthy. The clinic will call you to schedule the appointment. Please call the clinic with questions/concerns: [phone].    Otolaryngology/ENT Clinic:  [facility]  [address] [place] [address]     Discharge Instructions   Order Comments: If you start having symptoms of low calcium, such as numbness or tingling around your mouth/lips or numbness/tingling in your fingers, please take an extra dose of TUMS and call the clinic.     Wound care and dressings   Order Comments: Instructions to care for your wound at home: Keep incisions clean and dry. Apply Aquaphor ointment to incisions three times daily to keep moist. You may shower, do not soak, scrub, or submerge incisions under water. If you have a surgical drain, do not get the drain site wet until 24 hours after the drain has been removed.     Diet   Order Comments: Resume your regular diet     Order Specific Question Answer Comments   Is discharge order? Yes        Dispo: To home in good condition. All of the patient's questions/concerns have been addressed at this time.       Jacob Diego, " MD  Otolaryngology - Head & Neck Surgery PGY-1     Edita Mccormack PA-C  Otolaryngology-Head & Neck Surgery        Teaching statement:  I saw the patient prior to discharge. She was comfortable with leaving. Drain care being done by nursing. Discussed need for extra dose of calcium if develop tingling or numbness.     Prema Krishnamurthy MD    Department of Otolaryngology

## 2023-02-22 NOTE — TELEPHONE ENCOUNTER
[facility] Call Center    Phone Message    May a detailed message be left on voicemail: yes     Reason for Call: Other: pt asking if he take take ibuprofen instead of tylenol for pain please call to discuss. Sending message per priority line.     Action Taken: Other: routing to [facility] ent csc    Travel Screening: Not Applicable

## 2023-02-22 NOTE — PLAN OF CARE
OBSERVATION GOALS:  Pain tolerated on PO  - MET  Calcium stable postop  -MET  Independently care for FRANCISCO JAVIER - MET

## 2023-02-23 NOTE — TELEPHONE ENCOUNTER
Responsible Attending Physician: Yessy   Date of Discharge: 2/22  Discharge to:  Home     Current Status:  Pt is a 41 y/o s/p thyroidectomy with neck dissection on 2/21.  Reports pre-op symptoms improved.   Operative  pain is decreasing.  Ambulating without assistance.  Pain well controlled with current meds, ample supply.  Reports incision CDI without signs of infection.  Denies redness, swelling, increased tenderness, or elevated temp.  Denies current bowel or bladder issues.        Discharge instructions and medication use were reviewed.  RN triage/on call number given:  [phone] or after hours and w/e - [phone].  Patient verbalized understanding and agreement with current plan.

## 2023-02-23 NOTE — TELEPHONE ENCOUNTER
Called patient to review questions. Discussed recommendation is to use tylenol at this time. Patient agreeable to plan of care. Will follow up on Monday for drain removal.

## 2023-02-27 ENCOUNTER — ALLIED HEALTH/NURSE VISIT (OUTPATIENT)
Dept: OTOLARYNGOLOGY | Facility: CLINIC | Age: 43
End: 2023-02-27
Payer: COMMERCIAL

## 2023-02-27 DIAGNOSIS — C73 PAPILLARY THYROID CARCINOMA (H): Primary | ICD-10-CM

## 2023-02-27 PROCEDURE — 99207 PR NO CHARGE NURSE ONLY: CPT

## 2023-02-27 NOTE — PROGRESS NOTES
Patient in clinic today for FRANCISCO JAVIER drain removal. Drain output in last 24 hours was 10 ml, indicating FRANCISCO JAVIER #1 removal as it was less than 30 ml in 24 hours. FRANCISCO JAVIER #2 had 5 ml in the last 24 hours. Incision site evaluated and it was clean, dry and intact without evidence of redness, swelling or drainage. FRANCISCO JAVIER site was cleansed, suture around tubing was removed, drain bulb was opened, and drain was removed easily. Patient educated on signs and symptoms of infection, site care and provided number to call with further questions or concerns. Patient verbalized understanding and was encouraged to call with any further questions or concerns    Elzbieta SCHUMACHERN, RN

## 2023-02-28 LAB
PATH REPORT.COMMENTS IMP SPEC: ABNORMAL
PATH REPORT.COMMENTS IMP SPEC: YES
PATH REPORT.FINAL DX SPEC: ABNORMAL
PATH REPORT.GROSS SPEC: ABNORMAL
PATH REPORT.INTRAOP OBS SPEC DOC: ABNORMAL
PATH REPORT.MICROSCOPIC SPEC OTHER STN: ABNORMAL
PATH REPORT.RELEVANT HX SPEC: ABNORMAL
PATHOLOGY SYNOPTIC REPORT: ABNORMAL
PHOTO IMAGE: ABNORMAL

## 2023-03-03 ENCOUNTER — OFFICE VISIT (OUTPATIENT)
Dept: OTOLARYNGOLOGY | Facility: CLINIC | Age: 43
End: 2023-03-03
Payer: COMMERCIAL

## 2023-03-03 VITALS
WEIGHT: 241 LBS | HEART RATE: 102 BPM | DIASTOLIC BLOOD PRESSURE: 88 MMHG | SYSTOLIC BLOOD PRESSURE: 141 MMHG | BODY MASS INDEX: 31.94 KG/M2 | HEIGHT: 73 IN | OXYGEN SATURATION: 98 %

## 2023-03-03 DIAGNOSIS — C77.0 SECONDARY MALIGNANCY OF LYMPH NODES OF HEAD, FACE AND NECK (H): ICD-10-CM

## 2023-03-03 DIAGNOSIS — C73 PAPILLARY THYROID CARCINOMA (H): Primary | ICD-10-CM

## 2023-03-03 PROCEDURE — 99024 POSTOP FOLLOW-UP VISIT: CPT | Performed by: OTOLARYNGOLOGY

## 2023-03-03 ASSESSMENT — PAIN SCALES - GENERAL: PAINLEVEL: NO PAIN (0)

## 2023-03-03 NOTE — PROGRESS NOTES
Dear Dr Aldana:    I had the pleasure of seeing Gordon Stack in follow-up today at the [facility].     History of Present Illness:   Gordon Stack is a 42 year old with a T3N1b papillary thyroid cancer.    11/21/2022: Visit with PCP with week history of right anterior neck mass    12/8/2022: Follow-up with PCP with persistent neck mass, recommended for U/S     12/13/2022: U/S thyroid showed 5.2 x 4.6 x 4 cm right inferior thyroid nodule TIRADS 4, 0.7 x 0.8 x 0.5 cm left superior thyroid nodule TIRADS 4, 1 x 0.9 x 0.8 cm left superior thyroid nodule TIRADS 3.    12/22/2022: CT neck showing 4.8 x 4.1 x 5.4 cm right thyroid nodule, no lymphadenopathy     12/22/2022: U/S guided FNA right thyroid nodule. Pathology consistent with papillary thyroid cancer.     12/28/2022: follow-up with PCP, referred to ENT    1/13/2023: Seen in clinic, discussed surgery    1/18/2023: Dedicated neck U/S demonstrating concern for ina metastasis    2/13/2023: IR guided biopsy of right level III node - positive for metastatic papillary thyroid carcinoma    2/21/2023: Taken to the OR for a total thyroidectomy and right neck dissection, reimplantation of 2 parathyroid glands. Final pathology demonstrated 5.6 cm papillary thyroid carcinoma, extensive angioinvasion, no extrathyroidal extension, negative margins, 14/52 positive nodes including levels II, III, IV, VI.    Gordon says she is doing well. She has no tingling in the fingers anymore. There is some numbness under the chin. She has no issues eating. She feels there are is some more deepness to her voice and issues with projection. She has not required narcotics. She had both her drains removed. She feels the shoulder exercises are going well. She has some muscle soreness in the shoulder and into the neck. This is noticed with picking up something or carrying something for a long period of time.      MEDICATIONS:     Current Outpatient Medications  "  Medication Sig Dispense Refill     B-D HYPODERMIC NEEDLE 23G X 1\" MISC Use for estradiol weekly injections 25 each 3     B-D SYRINGE LUER-EUSEBIO 1 ML MISC Use for weekly injections 25 each 1     BD DISP NEEDLES 18G X 1-1/2\" MISC USE FOR WEEKLY ESTRADIOL INJECTIONS. 25 each 3     BD HYPODERMIC NEEDLE 18G X 1\" MISC Use for weekly estradiol injections 25 each 3     calcitRIOL (ROCALTROL) 0.25 MCG capsule Take 1 capsule (0.25 mcg) by mouth 2 times daily 60 capsule 1     calcium carbonate (TUMS) 500 MG chewable tablet Take 5 tablets (2,500 mg) by mouth 3 times daily 300 tablet 0     estradiol valerate (DELESTROGEN) 20 MG/ML injection INJECT 1 ML (20MG) INTO THE MUSLE EVERY 28 DAYS 5 mL 1     estradiol valerate (DELESTROGEN) 20 MG/ML injection INJECT 1 ML (20MG) INTO THE MUSLE EVERY 28 DAYS 5 mL 0     estradiol valerate (DELESTROGEN) 20 MG/ML injection Inject 1 mL (20 mg) into the muscle every 28 days 1 mL 3     estradiol valerate (DELESTROGEN) 20 MG/ML injection INJECT 0.2ML INTRAMUSCULARLY ONCE A WEEK. DISCARD VIAL 28 DAYS AFTER FIRST USE 5 mL 0     fluticasone (FLONASE) 50 MCG/ACT nasal spray SPRAY 1 SPRAY INTO BOTH NOSTRILS DAILY 15.8 mL 1     levothyroxine (SYNTHROID/LEVOTHROID) 125 MCG tablet Take 1 tablet (125 mcg) by mouth every morning (before breakfast) 60 tablet 3     lisinopril (ZESTRIL) 10 MG tablet Take 1 tablet (10 mg) by mouth daily Please schedule your annual check up for November 2021 90 tablet 1     mineral oil-hydrophilic petrolatum (AQUAPHOR) external ointment Apply topically every 8 hours Apply to incision. 50 g 1     oxyCODONE (ROXICODONE) 5 MG tablet Take 1 tablet (5 mg) by mouth every 4 hours as needed for moderate pain (4-6) 20 tablet 0     sennosides (SENOKOT) 8.6 MG tablet Take 1 tablet by mouth daily as needed for constipation 20 tablet 0     spironolactone (ALDACTONE) 100 MG tablet Take 1 tablet (100 mg) by mouth daily 90 tablet 3       ALLERGIES:  No Known Allergies    HABITS/SOCIAL HISTORY: "    Former smoker, 1/2-1 ppd, age 17 to age 41. No chewing tobacco use. Drinks couple drinks per week. No vaping or other drug use.   .   Lives alone.       Social History     Socioeconomic History     Marital status: Single     Spouse name: Not on file     Number of children: Not on file     Years of education: Not on file     Highest education level: Not on file   Occupational History     Not on file   Tobacco Use     Smoking status: Some Days     Smokeless tobacco: Never   Vaping Use     Vaping Use: Never used   Substance and Sexual Activity     Alcohol use: Yes     Comment: occ     Drug use: No     Sexual activity: Not Currently   Other Topics Concern     Parent/sibling w/ CABG, MI or angioplasty before 65F 55M? No   Social History Narrative     Not on file     Social Determinants of Health     Financial Resource Strain: Not on file   Food Insecurity: Not on file   Transportation Needs: Not on file   Physical Activity: Not on file   Stress: Not on file   Social Connections: Not on file   Intimate Partner Violence: Not on file   Housing Stability: Not on file       PAST MEDICAL HISTORY:   Past Medical History:   Diagnosis Date     Allergic state      Gender dysphoria      Hypertension      Malignant neoplasm of thyroid gland (H)      Mixed hyperlipidemia         PAST SURGICAL HISTORY:   Past Surgical History:   Procedure Laterality Date     IR FINE NEEDLE ASPIRATION W ULTRASOUND  02/13/2023     THYROIDECTOMY N/A 2/21/2023    Procedure: THYROIDECTOMY, TOTAL with right neck dissection; parathyroid reimplantation x2;  Surgeon: Prema Krishnamurthy MD;  Location: [facility] OR     THYROIDECTOMY, TOTAL with right neck dissection; parathyroid reimplantation x2  02/21/2023       FAMILY HISTORY:    Family History   Problem Relation Age of Onset     Asthma Brother        REVIEW OF SYSTEMS:  12 point ROS was negative other than the symptoms noted above in the HPI.  Patient Supplied Answers to Review of Systems  [facility]  "ROS 1/13/2023   Allergy/Immunology: Allergies or hay fever         PHYSICAL EXAMINATION:   BP (!) 141/88 (BP Location: Right arm, Patient Position: Sitting, Cuff Size: Adult Regular)   Pulse 102   Ht 1.854 m (6' 1\")   Wt 109.3 kg (241 lb)   SpO2 98%   BMI 31.80 kg/m     Patient in NAD  Breathing comfortably on room air  No significant voice changes   Neck incision healing appropriately with prolene sutures in place  No evidence of infection or seroma  Prominent right carotid    PROCEDURES:       RESULTS REVIEWED:   I reviewed path report    IMPRESSION AND PLAN:   Gordon Stack is a 42 year old with T3N1b papillary thyroid carcinoma. She had total thyroidectomy and right neck dissection completed on 2/21/2023.     She is doing well from surgery.    We discussed pathology today.    Sutures were removed, steri strips placed on incision. Discussed wound care/sun protection.     She will need to see endocrinology to discuss THORPE. Referral placed.    We will recheck her PTH and iCa, expect to recover. She is on calcium taper. Instructed to take extra dose of calcium if numbness/tingling/cramping.    She has shoulder exercises from OT, can do outpatient referral if she feels she needs outpatient therapy. She feels comfortable without referral right now but can update us if this changes.    Follow-up PRN.      Thank you very much for the opportunity to participate in the care of your patient.      Prema Krishnamurthy MD  Otolaryngology- Head & Neck Surgery      This note was dictated with voice recognition software and then edited. Please excuse any unintentional errors.         CC:  Candelaria Silva MD  [address] [facility] [address]  [facility] [address]      Joby Aldana DO  [address] [place] [id number]    "

## 2023-03-03 NOTE — LETTER
3/3/2023       RE: Gordon Stack  [id number]   [id number]  [facility] [address]     Dear Colleague,    Thank you for referring your patient, Gordon Stack, to the [facility] [place] at [facility]. Please see a copy of my visit note below.    Dear Dr Aldana:    I had the pleasure of seeing Gordon Stack in follow-up today at the [facility].     History of Present Illness:   Gordon Stack is a 42 year old with a T3N1b papillary thyroid cancer.    11/21/2022: Visit with PCP with week history of right anterior neck mass    12/8/2022: Follow-up with PCP with persistent neck mass, recommended for U/S     12/13/2022: U/S thyroid showed 5.2 x 4.6 x 4 cm right inferior thyroid nodule TIRADS 4, 0.7 x 0.8 x 0.5 cm left superior thyroid nodule TIRADS 4, 1 x 0.9 x 0.8 cm left superior thyroid nodule TIRADS 3.    12/22/2022: CT neck showing 4.8 x 4.1 x 5.4 cm right thyroid nodule, no lymphadenopathy     12/22/2022: U/S guided FNA right thyroid nodule. Pathology consistent with papillary thyroid cancer.     12/28/2022: follow-up with PCP, referred to ENT    1/13/2023: Seen in clinic, discussed surgery    1/18/2023: Dedicated neck U/S demonstrating concern for ina metastasis    2/13/2023: IR guided biopsy of right level III node - positive for metastatic papillary thyroid carcinoma    2/21/2023: Taken to the OR for a total thyroidectomy and right neck dissection, reimplantation of 2 parathyroid glands. Final pathology demonstrated 5.6 cm papillary thyroid carcinoma, extensive angioinvasion, no extrathyroidal extension, negative margins, 14/52 positive nodes including levels II, III, IV, VI.    Gordon says she is doing well. She has no tingling in the fingers anymore. There is some numbness under the chin. She has no issues eating. She feels there are is some more deepness to her voice and issues with  "projection. She has not required narcotics. She had both her drains removed. She feels the shoulder exercises are going well. She has some muscle soreness in the shoulder and into the neck. This is noticed with picking up something or carrying something for a long period of time.      MEDICATIONS:     Current Outpatient Medications   Medication Sig Dispense Refill     B-D HYPODERMIC NEEDLE 23G X 1\" MISC Use for estradiol weekly injections 25 each 3     B-D SYRINGE LUER-EUSEBIO 1 ML MISC Use for weekly injections 25 each 1     BD DISP NEEDLES 18G X 1-1/2\" MISC USE FOR WEEKLY ESTRADIOL INJECTIONS. 25 each 3     BD HYPODERMIC NEEDLE 18G X 1\" MISC Use for weekly estradiol injections 25 each 3     calcitRIOL (ROCALTROL) 0.25 MCG capsule Take 1 capsule (0.25 mcg) by mouth 2 times daily 60 capsule 1     calcium carbonate (TUMS) 500 MG chewable tablet Take 5 tablets (2,500 mg) by mouth 3 times daily 300 tablet 0     estradiol valerate (DELESTROGEN) 20 MG/ML injection INJECT 1 ML (20MG) INTO THE MUSLE EVERY 28 DAYS 5 mL 1     estradiol valerate (DELESTROGEN) 20 MG/ML injection INJECT 1 ML (20MG) INTO THE MUSLE EVERY 28 DAYS 5 mL 0     estradiol valerate (DELESTROGEN) 20 MG/ML injection Inject 1 mL (20 mg) into the muscle every 28 days 1 mL 3     estradiol valerate (DELESTROGEN) 20 MG/ML injection INJECT 0.2ML INTRAMUSCULARLY ONCE A WEEK. DISCARD VIAL 28 DAYS AFTER FIRST USE 5 mL 0     fluticasone (FLONASE) 50 MCG/ACT nasal spray SPRAY 1 SPRAY INTO BOTH NOSTRILS DAILY 15.8 mL 1     levothyroxine (SYNTHROID/LEVOTHROID) 125 MCG tablet Take 1 tablet (125 mcg) by mouth every morning (before breakfast) 60 tablet 3     lisinopril (ZESTRIL) 10 MG tablet Take 1 tablet (10 mg) by mouth daily Please schedule your annual check up for November 2021 90 tablet 1     mineral oil-hydrophilic petrolatum (AQUAPHOR) external ointment Apply topically every 8 hours Apply to incision. 50 g 1     oxyCODONE (ROXICODONE) 5 MG tablet Take 1 tablet (5 mg) " by mouth every 4 hours as needed for moderate pain (4-6) 20 tablet 0     sennosides (SENOKOT) 8.6 MG tablet Take 1 tablet by mouth daily as needed for constipation 20 tablet 0     spironolactone (ALDACTONE) 100 MG tablet Take 1 tablet (100 mg) by mouth daily 90 tablet 3       ALLERGIES:  No Known Allergies    HABITS/SOCIAL HISTORY:    Former smoker, 1/2-1 ppd, age 17 to age 41. No chewing tobacco use. Drinks couple drinks per week. No vaping or other drug use.   .   Lives alone.       Social History     Socioeconomic History     Marital status: Single     Spouse name: Not on file     Number of children: Not on file     Years of education: Not on file     Highest education level: Not on file   Occupational History     Not on file   Tobacco Use     Smoking status: Some Days     Smokeless tobacco: Never   Vaping Use     Vaping Use: Never used   Substance and Sexual Activity     Alcohol use: Yes     Comment: occ     Drug use: No     Sexual activity: Not Currently   Other Topics Concern     Parent/sibling w/ CABG, MI or angioplasty before 65F 55M? No   Social History Narrative     Not on file     Social Determinants of Health     Financial Resource Strain: Not on file   Food Insecurity: Not on file   Transportation Needs: Not on file   Physical Activity: Not on file   Stress: Not on file   Social Connections: Not on file   Intimate Partner Violence: Not on file   Housing Stability: Not on file       PAST MEDICAL HISTORY:   Past Medical History:   Diagnosis Date     Allergic state      Gender dysphoria      Hypertension      Malignant neoplasm of thyroid gland (H)      Mixed hyperlipidemia         PAST SURGICAL HISTORY:   Past Surgical History:   Procedure Laterality Date     IR FINE NEEDLE ASPIRATION W ULTRASOUND  02/13/2023     THYROIDECTOMY N/A 2/21/2023    Procedure: THYROIDECTOMY, TOTAL with right neck dissection; parathyroid reimplantation x2;  Surgeon: Prema Krishnamurthy MD;  Location:  OR      "THYROIDECTOMY, TOTAL with right neck dissection; parathyroid reimplantation x2  02/21/2023       FAMILY HISTORY:    Family History   Problem Relation Age of Onset     Asthma Brother        REVIEW OF SYSTEMS:  12 point ROS was negative other than the symptoms noted above in the HPI.  Patient Supplied Answers to Review of Systems  [facility] ROS 1/13/2023   Allergy/Immunology: Allergies or hay fever         PHYSICAL EXAMINATION:   BP (!) 141/88 (BP Location: Right arm, Patient Position: Sitting, Cuff Size: Adult Regular)   Pulse 102   Ht 1.854 m (6' 1\")   Wt 109.3 kg (241 lb)   SpO2 98%   BMI 31.80 kg/m     Patient in NAD  Breathing comfortably on room air  No significant voice changes   Neck incision healing appropriately with prolene sutures in place  No evidence of infection or seroma  Prominent right carotid    PROCEDURES:       RESULTS REVIEWED:   I reviewed path report    IMPRESSION AND PLAN:   Gordon Stack is a 42 year old with T3N1b papillary thyroid carcinoma. She had total thyroidectomy and right neck dissection completed on 2/21/2023.     She is doing well from surgery.    We discussed pathology today.    Sutures were removed, steri strips placed on incision. Discussed wound care/sun protection.     She will need to see endocrinology to discuss THORPE. Referral placed.    We will recheck her PTH and iCa, expect to recover. She is on calcium taper. Instructed to take extra dose of calcium if numbness/tingling/cramping.    She has shoulder exercises from OT, can do outpatient referral if she feels she needs outpatient therapy. She feels comfortable without referral right now but can update us if this changes.    Follow-up PRN.      Thank you very much for the opportunity to participate in the care of your patient.      Prema Krishnamurthy MD  Otolaryngology- Head & Neck Surgery      This note was dictated with voice recognition software and then edited. Please excuse any unintentional errors.       Again, thank " you for allowing me to participate in the care of your patient.      Sincerely,    Prema Krishnamurthy MD

## 2023-03-03 NOTE — NURSING NOTE
"Chief Complaint   Patient presents with     RECHECK     Post op    Blood pressure (!) 141/88, pulse 102, height 1.854 m (6' 1\"), weight 109.3 kg (241 lb), SpO2 98 %. Jayna Finn LPN    "

## 2023-03-06 ENCOUNTER — LAB (OUTPATIENT)
Dept: LAB | Facility: CLINIC | Age: 43
End: 2023-03-06
Payer: COMMERCIAL

## 2023-03-06 DIAGNOSIS — C73 PAPILLARY THYROID CARCINOMA (H): ICD-10-CM

## 2023-03-06 DIAGNOSIS — C73 PAPILLARY THYROID CARCINOMA (H): Primary | ICD-10-CM

## 2023-03-06 LAB
CA-I BLD-MCNC: 4.5 MG/DL (ref 4.4–5.2)
PTH-INTACT SERPL-MCNC: 12 PG/ML (ref 15–65)

## 2023-03-06 PROCEDURE — 82330 ASSAY OF CALCIUM: CPT

## 2023-03-06 PROCEDURE — 83970 ASSAY OF PARATHORMONE: CPT

## 2023-03-06 PROCEDURE — 36415 COLL VENOUS BLD VENIPUNCTURE: CPT

## 2023-03-06 NOTE — TELEPHONE ENCOUNTER
DX, Referring NOTES: Papillary Thyroid Carcinoma, Secondary malignancy of lymph nodes of head/face/neck; referred by Dr. Prema Krishnamurthy    For Cancer Patients: Need the original operative and surgical pathology reports and all imaging reports/images related to the disease (includes all thyroid US, nuclear thyroid and total body scans, PET scans, chest CT reports since prior to the diagnosis ).   APPT DATE: 3/29/2023   NOTES (FOR ALL VISITS) STATUS DETAILS   OFFICE NOTES from referring provider Internal [facility]:  3/3/23, 1/13/23 - ENT OV with Dr. Krishnamurthy   OFFICE NOTES from other specialist Care Everywhere / Internal [facility]:  12/28/22, 12/8/22 - [facility] OV with Dr. Aldana    Critical access hospital:  8/20/15 - ENT OV with Dr. Morales   ED NOTES N/A    OPERATIVE REPORT  (thyroid, pituitary, adrenal, parathyroid)  (All op notes related to diagnoses) Internal [facility]:  2/21/23 - OP Note for TOTAL THYROIDECTOMY, RIGHT NECK DISSECTION, PARATHYROID REIMPLANTATION with Dr. Krishnamurthy   MEDICATION LIST Internal    IMAGING      XR (Chest) Internal [facility]:  12/22/22 - XR Chest   CT (HEAD/NECK/CHEST/ABDOMEN) Internal [facility]:  12/22/22 - CT Neck  8/28/16 - CT Abd/Pelvis   ULTRASOUND (HEAD/NECK)  * Include FNAs Internal [facility]:  2/13/23 - US FNA Thyroid  1/18/23 - US Head/Neck  12/22/22 - US Thyroid FNA  [facility]:  12/13/22 - US Thyroid   LABS     DIABETES: HBGA1C, CREATININE, FASTING LIPIDS, MICROALBUMIN URINE, POTASSIUM, TSH, T4    THYROID: TSH, T4, CBC, THYRODLONULIN, TOTAL T3, FREE T4, CALCITONIN, CEA Internal [facility]:  3/6/23 - Calcium  3/6/23 - Parathyroid Hormone  2/21/23 - Glucose  2/14/23 - BMP  2/14/23 - Testosterone  2/13/23 - CBC  11/21/22 - TSH, T4, T3  11/11/22 - CMP  11/11/22 - Lipid  11/17/21 - HBGA1C   PATHOLOGY REPORTS WITH CASE NUMBER  *Surgical path reports for endocrine organs (ovaries, testes, pancreas, etc) Internal   [facility]:  2/21/23 - Thyroid Biopsy (Case: [id number])  2/13/23 - Thyroid FNA (Case:  [id number])  12/22/22 - Thyroid FNA (Case: [id number])

## 2023-03-20 ENCOUNTER — LAB (OUTPATIENT)
Dept: LAB | Facility: CLINIC | Age: 43
End: 2023-03-20
Payer: COMMERCIAL

## 2023-03-20 DIAGNOSIS — C73 PAPILLARY THYROID CARCINOMA (H): ICD-10-CM

## 2023-03-20 DIAGNOSIS — C73 PAPILLARY THYROID CARCINOMA (H): Primary | ICD-10-CM

## 2023-03-20 LAB
CA-I BLD-MCNC: 4.7 MG/DL (ref 4.4–5.2)
PTH-INTACT SERPL-MCNC: 13 PG/ML (ref 15–65)

## 2023-03-20 PROCEDURE — 83970 ASSAY OF PARATHORMONE: CPT

## 2023-03-20 PROCEDURE — 82330 ASSAY OF CALCIUM: CPT

## 2023-03-20 PROCEDURE — 36415 COLL VENOUS BLD VENIPUNCTURE: CPT

## 2023-03-27 DIAGNOSIS — I10 ESSENTIAL HYPERTENSION: ICD-10-CM

## 2023-03-27 RX ORDER — LISINOPRIL 10 MG/1
TABLET ORAL
Qty: 90 TABLET | Refills: 1 | Status: SHIPPED | OUTPATIENT
Start: 2023-03-27 | End: 2023-09-25

## 2023-03-27 NOTE — TELEPHONE ENCOUNTER
"Request for medication refill:  lisinopril (ZESTRIL) 10 MG tablet    Providers if patient needs an appointment and you are willing to give a one month supply please refill for one month and  send a letter/MyChart using \".SMILLIMITEDREFILL\" .smillimited and route chart to \"P [facility] \" (Giving one month refill in non controlled medications is strongly recommended before denial)    If refill has been denied, meaning absolutely no refills without visit, please complete the smart phrase \".smirxrefuse\" and route it to the \"P SMI MED REFILLS\"  pool to inform the patient and the pharmacy.    Linda Medley MA        "

## 2023-03-29 ENCOUNTER — PRE VISIT (OUTPATIENT)
Dept: ENDOCRINOLOGY | Facility: CLINIC | Age: 43
End: 2023-03-29

## 2023-03-29 ENCOUNTER — LAB (OUTPATIENT)
Dept: LAB | Facility: CLINIC | Age: 43
End: 2023-03-29
Payer: COMMERCIAL

## 2023-03-29 ENCOUNTER — OFFICE VISIT (OUTPATIENT)
Dept: ENDOCRINOLOGY | Facility: CLINIC | Age: 43
End: 2023-03-29
Attending: OTOLARYNGOLOGY
Payer: COMMERCIAL

## 2023-03-29 VITALS
HEART RATE: 83 BPM | OXYGEN SATURATION: 100 % | SYSTOLIC BLOOD PRESSURE: 147 MMHG | BODY MASS INDEX: 32.46 KG/M2 | DIASTOLIC BLOOD PRESSURE: 80 MMHG | WEIGHT: 246 LBS

## 2023-03-29 DIAGNOSIS — C73 PAPILLARY THYROID CARCINOMA (H): ICD-10-CM

## 2023-03-29 DIAGNOSIS — Z77.098 EXPOSURE TO IODINE: ICD-10-CM

## 2023-03-29 DIAGNOSIS — R79.89 LOW SERUM PARATHYROID HORMONE (PTH): ICD-10-CM

## 2023-03-29 DIAGNOSIS — E89.0 POSTOPERATIVE HYPOTHYROIDISM: ICD-10-CM

## 2023-03-29 DIAGNOSIS — C77.0 SECONDARY MALIGNANCY OF LYMPH NODES OF HEAD, FACE AND NECK (H): ICD-10-CM

## 2023-03-29 DIAGNOSIS — C73 PAPILLARY THYROID CARCINOMA (H): Primary | ICD-10-CM

## 2023-03-29 DIAGNOSIS — Z78.9 MALE-TO-FEMALE TRANSGENDER PERSON: ICD-10-CM

## 2023-03-29 LAB
CALCIUM SERPL-MCNC: 9.5 MG/DL (ref 8.6–10)
Lab: NORMAL
PERFORMING LABORATORY: NORMAL
PHOSPHATE SERPL-MCNC: 4.4 MG/DL (ref 2.5–4.5)
PTH-INTACT SERPL-MCNC: 19 PG/ML (ref 15–65)
SPECIMEN STATUS: NORMAL
T4 FREE SERPL-MCNC: 1.17 NG/DL (ref 0.9–1.7)
TEST NAME: NORMAL
TSH SERPL DL<=0.005 MIU/L-ACNC: 18.03 UIU/ML (ref 0.3–4.2)

## 2023-03-29 PROCEDURE — 82570 ASSAY OF URINE CREATININE: CPT | Performed by: PATHOLOGY

## 2023-03-29 PROCEDURE — 84439 ASSAY OF FREE THYROXINE: CPT | Performed by: PATHOLOGY

## 2023-03-29 PROCEDURE — 83789 MASS SPECTROMETRY QUAL/QUAN: CPT | Performed by: PATHOLOGY

## 2023-03-29 PROCEDURE — 99000 SPECIMEN HANDLING OFFICE-LAB: CPT | Performed by: PATHOLOGY

## 2023-03-29 PROCEDURE — 86800 THYROGLOBULIN ANTIBODY: CPT | Mod: 90 | Performed by: PATHOLOGY

## 2023-03-29 PROCEDURE — 84100 ASSAY OF PHOSPHORUS: CPT | Performed by: PATHOLOGY

## 2023-03-29 PROCEDURE — 84443 ASSAY THYROID STIM HORMONE: CPT | Performed by: PATHOLOGY

## 2023-03-29 PROCEDURE — 99417 PROLNG OP E/M EACH 15 MIN: CPT

## 2023-03-29 PROCEDURE — 84432 ASSAY OF THYROGLOBULIN: CPT | Mod: 90 | Performed by: PATHOLOGY

## 2023-03-29 PROCEDURE — 83970 ASSAY OF PARATHORMONE: CPT | Performed by: PATHOLOGY

## 2023-03-29 PROCEDURE — 99205 OFFICE O/P NEW HI 60 MIN: CPT | Mod: 24

## 2023-03-29 PROCEDURE — 36415 COLL VENOUS BLD VENIPUNCTURE: CPT | Performed by: PATHOLOGY

## 2023-03-29 PROCEDURE — 82310 ASSAY OF CALCIUM: CPT | Performed by: PATHOLOGY

## 2023-03-29 ASSESSMENT — PAIN SCALES - GENERAL: PAINLEVEL: NO PAIN (0)

## 2023-03-29 NOTE — LETTER
3/29/2023       RE: Gordon Stack  [address]  [facility] [address]     Dear Colleague,    Thank you for referring your patient, Gordon Stack, to the [facility]OCRIN[facility] [place] at [facility]. Please see a copy of my visit note below.    Endocrine Consult note    Attending Assessment/Plan :      Papillary thyroid carcinoma 5.6 cm, right, +BRAF V600E mutation , extensive angioinvasion, + 14/52 LN including level 2-4, 7, largest metastatic deposit 1 cm   MACIS 5.04 assuming no distant  pT3a,pN1b, cMx  PRIYA recurrence risk intermediate to high  Recommend adjuvant 131I 100 mCi thyrogen stimulated   I have counseled today on the following:  Indications for radioactive iodine and expected outcome  Radioiodine treatment procedure,  Radiation safety precautions  Low iodine diet  Plans for future follow up  We are tentatively setting up thyrogen stimulated treatment for the week of 5/2- see AVS schedule and orders     Addendum  3/29/2023 Tg 1.6, PRIYA < 0.4 , TSH 18.03, free t4 1.17, Ca 9.5, phos 4.4, PTH 19; urine iodine pending-- increase LT4 to 175 x 7.5/week divided (1.7 mcg/kg/day dose).  Taper off calcitriol     Post surgical hypothyroidism - on LT4 125 mcg/day (1.14 mcg/kg/day).  I expect we will need a dose increase.   Labs today and in 4 months    Low PTH has been present post op with some recovery over short term.  On calcitriol but not calcium without low calcium symptoms.   Labs today.     Transgender female (pronouns she/her hers) on gender affirming hormone therapy estradiol, spironolactone. This is being managed by primary care at [facility].  She is asking us to take over which we can. She reports her urrent estrogen dose is 0.2 ml/week (? 4 mg of 20 mg/ml), not what is on the MAR, which includes multiple orders for estradiol.     BMI 31.8    Exposure to iodine 12/2022.    Spot urine iodine with labs today    Previsit  prep time : 6944-3029 AM  _93_ minutes spent on the date of the encounter doing chart review, history and exam, documentation and further activities as noted above.    Shefali Diaz MD      Chief complaint:  Gordon is a 42 year old transgender female (pronouse she/her/hers) seen in consultation at the request of Dr Krishnamurthy for thyroid cancer.   I have reviewed Care Everywhere including [facility], [facility] lab reports, imaging reports and provider notes as indicated.      HISTORY OF PRESENT ILLNESS    Gordon initially presented with right neck mass concern in November 2022.   Work up was as follows:   12/22/22 FNAB right thyroid nodule papillary thyroid carcinoma  2/13/23 FNAB lymph node metastatic Papillary thyroid carcinoma    Treatment has been as follows:   2/21/2023 total thyroidectomy, right level 2-4, 6, left parathyroid reimplantation x 2 (Dr Krishnamurthy): Papillary thyroid carcinoma 5.6 cm, right, +BRAF V600E mutation , extensive angioinvasion, + 14/52 LN including level 2-4, 7, largest metastatic deposit 1 cm .  She required hospitalization one day.  PTH was low post op. She continues on calcitriol but she is not taking calcium. She is not having low calcium symptoms.  She feels well on the current LT4 dose.      Endocrine relevant labs are as follows:  9/29/20 HgbA1c 5.5  11/16/2020 testosterone 12, estradiol 220  11/11/22 estradiol 138  11/21/22 TSH 1.27, free T4 1.37, T3 134  2/14/2023 Ca 10, creatinine 0.8, testosterone 14  2/21/2023 PTH 4, iCa 4.4  3/6/2023 PTH 12, iCa 4.5  3/20/23 PTH 13, iCa 4.7    Relevant imaging is as follows: (as read by me as it pertains to endocrine relevant organs)  12/13/22 US thyroid  Right thyroid nodule 4.6 x 4 x 5.2 cm iso/hypooechoic , heterogeneous,inferior   Left thyroid nodule # 2 0.5 x 0.7 x 0.8 cm hypoechoic 12/22/22 FNAB + Papillary thyroid carcinoma  Left thyroid nodule # 3 0.8 x 01 x 0.9 cm   12/12/22 CT neck with contrast  4.1 x 5.8 x   1/18/2023 US neck:   Right  "level 3 # 1 abnormal 0.9 x 0.6 x 2.1 cm- 2/13/23 FNAB + Papillary thyroid carcinoma     On estrogen since age 37;    Reduced hair growth     REVIEW OF SYSTEMS  Sleep Ok  Energy Ok  Lost volume  No choking on water  Cardiac: negative  Respiratory:   GI: negative   No paresthesias  No cramps  10 system ROS otherwise as per the HPI or negative    Past Medical History  Past Medical History:   Diagnosis Date    Allergic state     Gender dysphoria     Hypertension     Mixed hyperlipidemia     Papillary thyroid carcinoma (H)      Past Surgical History:   Procedure Laterality Date    IR FINE NEEDLE ASPIRATION W ULTRASOUND  02/13/2023    THYROIDECTOMY N/A 2/21/2023    Procedure: THYROIDECTOMY, TOTAL with right neck dissection; parathyroid reimplantation x2;  Surgeon: Prema Krishnamurthy MD;  Location: [facility] OR    THYROIDECTOMY, TOTAL with right neck dissection; parathyroid reimplantation x2  02/21/2023     Medications  Current Outpatient Medications   Medication Sig Dispense Refill    B-D HYPODERMIC NEEDLE 23G X 1\" MISC Use for estradiol weekly injections 25 each 3    B-D SYRINGE LUER-EUSEBIO 1 ML MISC Use for weekly injections 25 each 1    BD DISP NEEDLES 18G X 1-1/2\" MISC USE FOR WEEKLY ESTRADIOL INJECTIONS. 25 each 3    BD HYPODERMIC NEEDLE 18G X 1\" MISC Use for weekly estradiol injections 25 each 3    calcitRIOL (ROCALTROL) 0.25 MCG capsule Take 1 capsule (0.25 mcg) by mouth 2 times daily 60 capsule 1    calcium carbonate (TUMS) 500 MG chewable tablet Take 5 tablets (2,500 mg) by mouth 3 times daily 300 tablet 0    estradiol valerate (DELESTROGEN) 20 MG/ML injection INJECT 1 ML (20MG) INTO THE MUSLE EVERY 28 DAYS 5 mL 1    estradiol valerate (DELESTROGEN) 20 MG/ML injection INJECT 1 ML (20MG) INTO THE MUSLE EVERY 28 DAYS 5 mL 0    estradiol valerate (DELESTROGEN) 20 MG/ML injection Inject 1 mL (20 mg) into the muscle every 28 days 1 mL 3    estradiol valerate (DELESTROGEN) 20 MG/ML injection INJECT 0.2ML INTRAMUSCULARLY ONCE A " WEEK. DISCARD VIAL 28 DAYS AFTER FIRST USE 5 mL 0    fluticasone (FLONASE) 50 MCG/ACT nasal spray SPRAY 1 SPRAY INTO BOTH NOSTRILS DAILY 15.8 mL 1    levothyroxine (SYNTHROID/LEVOTHROID) 125 MCG tablet Take 1 tablet (125 mcg) by mouth every morning (before breakfast) 60 tablet 3    lisinopril (ZESTRIL) 10 MG tablet TAKE 1 TABLET (10 MG) BY MOUTH DAILY. (PLEASE SCHEDULE YOUR ANNUAL CHECK UP FOR NOVEMBER 2022) 90 tablet 1    mineral oil-hydrophilic petrolatum (AQUAPHOR) external ointment Apply topically every 8 hours Apply to incision. 50 g 1    oxyCODONE (ROXICODONE) 5 MG tablet Take 1 tablet (5 mg) by mouth every 4 hours as needed for moderate pain (4-6) 20 tablet 0    sennosides (SENOKOT) 8.6 MG tablet Take 1 tablet by mouth daily as needed for constipation 20 tablet 0    spironolactone (ALDACTONE) 100 MG tablet Take 1 tablet (100 mg) by mouth daily 90 tablet 3       Allergies  No Known Allergies      Family History  Family History   Problem Relation Age of Onset    Asthma Brother     Hyperthyroidism Maternal Grandmother     Prostate Cancer Maternal Grandfather 98    Thyroid Cancer No family hx of      Social History  Social History     Tobacco Use    Smoking status: Some Days    Smokeless tobacco: Never   Vaping Use    Vaping Use: Never used   Substance Use Topics    Alcohol use: Yes     Comment: occ    Drug use: No     Lives alone; employed; taking off work for treatment is not anticipated to be a problem    Physical Exam  GENERAL Pleasant , in NAD;  mask; voice is normal, deep.   BP (!) 147/80   Pulse 83   Wt 111.6 kg (246 lb)   SpO2 100%   BMI 32.46 kg/m    SKIN: normal color, temperature, texture  HEENT: PER, no scleral icterus, eyelid retraction, stare, lid lag, proptosis or conjunctival injection.  .    NECK: low transverse and right lateral neck longitudinal healed surgical scar.    LUNGS: clear to auscultation bilaterally.   CARDIAC: RRR, S1, S2 without murmurs, rubs or gallops.    BACK: normal spinal  contour.    NEURO: Alert, responds appropriately to questions,  moves all extremities, DTRs 2/4, gait normal, no tremor of the outstretched hand    DATA REVIEW    Surgical Pathology Exam: [id number]  Order: [id number]  Collected 2/21/2023  8:53 AM     Status: Final result     Visible to patient: Yes (seen)     0 Result Notes        Component Ref Range & Units  Resulting Agency   Case Report   Surgical Pathology Report                         Case: [id number]                                   Authorizing Provider:  Prema Krishnamurthy MD     Collected:           02/21/2023 08:53 AM           Ordering Location:      MAIN OR                 Received:            02/21/2023 01:14 PM           Pathologist:           Slime Fagan MD                                                                            Intraop:               Katina Grullon MD                                                              Specimens:   A) - Other, Delphian node                                                                            B) - Other, Rule out thyroid mets vs parathyroid gland                                               C) - Thyroid, Total thyroidectomy; stitch on Left Lobe                                               D) - Other, Left inferior parathyroid biopsy                                                         E) - Other, Right inferior parathyroid biopsy                                                        F) - Other, Right Level 2A                                                                           G) - Other, Right Level 3                                                                            H) - Other, Right Level 4                                                                            I) - Other, Right Level 6                                                                            J) - Other, Rule out  parathyroid biopsy                                                    Final Diagnosis   A.  LYMPH NODES, DELPHIAN NODE, EXCISION:  - METASTATIC PAPILLARY THYROID CARCINOMA TO ONE OUT OF ONE LYMPH NODE (1/1)  - Tumor deposit size: 2 mm in greatest dimension  - No extranodal extension      B. PARATHYROID GLAND, BIOPSY:  - Crushed fibroconnective tissue with a small fragment of thyroid tissue     C.  THYROID GLAND, TOTAL THYROIDECTOMY:  - PAPILLARY THYROID CARCINOMA, CLASSIC/CONVENTIONAL TYPE INVOLVING RIGHT THYROID LOBE     - Tumor size: 5.6 cm in greatest dimension, multifocal     - Angioinvasion is present, extensive     - No extrathyroidal extension identified     - Margins are negative (<1 mm).     - Non neoplastic thyroid tissue with benign adenomatoid nodules   - METASTATIC CARCINOMA TO TWO OUT OF TWO PERITHYROIDAL LYMPH NODES (2/2)     - Tumor deposit size: 5 mm in greatest dimension     - No extranodal extension identified   - AJCC pathologic stage mpT3a N1b     D.  PARATHYROID GLAND, LEFT INFERIOR PARATHYROID, BIOPSY:  - Benign parathyroid gland, negative for malignancy     E.  PARATHYROID GLAND, RIGHT INFERIOR PARATHYROID, BIOPSY:  - METASTATIC CARCINOMA TO ONE OUT OF ONE LYMPH NODE (1/1)  - Tumor deposit size: 2 mm in greatest dimension  - No extranodal extension identified      F.  LYMPH NODES, RIGHT LEVEL 2A, NECK DISSECTION:  - METASTATIC CARCINOMA TO ONE OUT OF TEN LYMPH NODES (1/10)  - Tumor deposit: 1 mm in greatest dimension  - No extranodal extension identified     G.  LYMPH NODES, RIGHT LEVEL 3, NECK DISSECTION:  - METASTATIC CARCINOMA TO THREE OUT OF EIGHTEEN LYMPH NODES (3/18)  - Tumor deposit size: 10 mm in greatest dimension  - No extranodal extension identified     H.  LYMPH NODES, RIGHT LEVEL 4, NECK DISSECTION:  -METASTATIC CARCINOMA TO ONE LYMPH NODE OUT OF THIRTEEN (1/13)  - Tumor deposit size:  4 mm in greatest dimension  - No extranodal extension identified      I.  LYMPH NODES, RIGHT  LEVEL 6, NECK DISSECTION:  - METASTATIC CARCINOMA TO FIVE LYMPH NODES OUT OF SIX (5/6)  - Tumor deposit size: 5 mm in greatest dimension  - No extranodal extension identified     J. PARATHYROID GLAND, BIOPSY:  - Benign parathyroid tissue, negative for malignancy   Electronically signed by Slime Fagan MD on 2/28/2023 at 10:37 AM         Comments:   This is an appended report. These results have been appended to a previously preliminary verified report.      Comment   [facility]   Immunohistochemical stain performed in block C2 for BRAF V600E mutation is present in the tumor cells. Control is adequate  Case was reviewed by the following resident: DANIELLE TRAN   Comment: This is an appended report. These results have been appended to a previously preliminary verified report.   Synoptic Checklist   THYROID GLAND  8th Edition - Protocol posted: 6/30/2021  THYROID GLAND: RESECTION - All Specimens  SPECIMEN   Procedure  Total thyroidectomy    TUMOR   Tumor Focality  Multifocal    Tumor Characteristics     Tumor Site  Right lobe    Tumor Size  Greatest Dimension (Centimeters): 5.6 cm   Histologic Type  Papillary carcinoma, classic (usual, conventional)    Angioinvasion (vascular invasion)  Extensive (4 or more vessels)    Lymphatic Invasion  Not identified    Perineural Invasion  Not identified    Extrathyroidal Extension  Not identified    Margin Status  All margins negative for carcinoma    Distance from Invasive Carcinoma to Closest Margin  Less than 1 mm    REGIONAL LYMPH NODES   Regional Lymph Node Status  Tumor present in regional lymph node(s)    Number of Lymph Nodes with Tumor  14    Connie Level(s) Involved  Level VI      Right Lateral Level II      Right Lateral Level III      Right Lateral Level IV    Size of Largest Metastatic Deposit  1.0 cm   Extranodal Extension (BLANCA)  Not identified    Number of Lymph Nodes Examined  52    Connie Level(s) Examined  Level VII      Right Lateral Level II       "Right Lateral Level III      Right Lateral Level IV    PATHOLOGIC STAGE CLASSIFICATION (pTNM, AJCC 8th Edition)   Reporting of pT, pN, and (when applicable) pM categories is based on information available to the pathologist at the time the report is issued. As per the AJCC (Chapter 1, 8th Ed.) it is the managing physician s responsibility to establish the final pathologic stage based upon all pertinent information, including but potentially not limited to this pathology report.   TNM Descriptors  m (multiple primary tumors)         pT Category  pT3a    pN Category  pN1b    ADDITIONAL FINDINGS   Additional Findings  Adenomatoid nodule(s) or nodular follicular disease      Parathyroid gland(s) present    Comment(s)  Best block for ancillary studies: C2    .      Clinical Information   [facility]   42-year-old adult with history of U/S guided FNA proven papillary thyroid cancer.   Comment: This is an appended report. These results have been appended to a previously preliminary verified report.   Intraoperative Consultation   JANEY BONILLA(2). Other, Rule out thyroid mets vs parathyroid gland:  BFS1:  - Minute fragment of crushed fibroconnective tissue; no evidence of malignancy        D(4). Other, Left inferior parathyroid biopsy:  DFS1:  - Parathyroid gland        E(5). Other, Right inferior parathyroid biopsy:  EFS1:  - Lymph node with metastatic PTC        J(10). Other, Rule out parathyroid biopsy:  JFS1:  - Parathyroid gland identified         Gross Description   JANEY DAVIDSON(1). Other, Delphian node:  The specimen is received in formalin with proper patient identification, labeled \"delphian node\".  The specimen consists of a 2.5 x 2.0 x 0.5 cm aggregate of tan-yellow adipose tissue.  Palpation reveals 1 possible node measuring 0.5 cm in greatest dimension.  It is bisected, wrapped and entirely submitted in cassette A1.             B(2). Other, Rule out thyroid mets vs parathyroid gland:  The specimen is received fresh for " "frozen section, with proper patient identification, labeled \"rule out thyroid mets versus parathyroid gland\".  It consists of 2 pieces of pink-tan soft tissue each measuring 0.2 cm in greatest dimension and weighing <0.01 g.  The specimen is submitted entirely for frozen section.  The remainder of the frozen section block is submitted as B1 FS.  C(3). Thyroid, Total thyroidectomy; stitch on Left Lobe:  The specimen is received fresh with proper patient identification labeled \"total thyroidectomy\".  The specimen consists of an 83.7 g total thyroid gland, measuring 7.8 cm (right to left) by 6.8 cm (superior-inferior) by 4.8 cm (anterior to posterior).  A stitch is present designating the left lobe.  The anterior surface of the right lobe is surgically roughened.  The remaining external surface is intact.  The specimen is inked: Anterior surface blue, posterior surface black.     The right lobe is 6.8 cm (superior to inferior) by 4.9 cm (lateral to medial) by 4.0 cm (anterior to posterior).  It is sectioned from superior to inferior aspect to reveal a 5.6 x 4.7 x 3.6 cm partially encapsulated white-tan firm mass, located in the mid to inferior aspect.  Further sectioning reveals central necrosis.  The remaining superior parenchyma is brown and homogenous without additional masses or nodules.     The isthmus (2.8 x 1.4 x 1.0 cm) is red-brown and homogenous without masses or nodules.     The left lobe is 5.6 cm (superior to inferior) by 3.2 cm (lateral to medial) by 2.0 cm (anterior to posterior).  It is serially sectioned from superior to inferior aspect to reveal 2 tan unencapsulated adjacent nodules ranging from 0.3 to 0.8 cm in greatest dimension, located in the mid aspect.  The remaining parenchyma is tan-red and homogenous without masses.  Representative sections are submitted.      C1: Uninvolved superior right lobe  C2-C7: Right lobe mass with partial capsule  C8: Uninvolved isthmus  C9-C10: Uninvolved superior " "left lobe  C11-C12: Left lobe nodules, totally submitted  D(4). Other, Left inferior parathyroid biopsy:  The specimen is received fresh for frozen section, with proper patient identification, labeled \"left inferior parathyroid biopsy\".  It consists of a 0.3 cm segment of pink-tan soft tissue, weighing <0.01 g.  The specimen is submitted entirely for frozen section.  The remainder of the frozen section block is submitted as D1 FS.  E(5). Other, Right inferior parathyroid biopsy:  The specimen is received fresh for frozen section, with proper patient identification, labeled \"right inferior parathyroid biopsy\".  It consists of a 0.4 cm segment of pink-tan soft tissue, weighing <0.01 g.  The specimen is submitted entirely for frozen section.  The remainder of the frozen section block is submitted as E1 FS.  F(6). Other, Right Level 2A:  The specimen is received in formalin with proper patient identification, labeled \"right level 2A\".  The specimen consists of a 4.0 x 2.5 x 1.5 cm aggregate of tan soft tissue.  Palpation reveals 10 possible nodes ranging from 0.1 to 1.4 cm in greatest dimension.  3 lymph nodes are >1.0 cm.  The lymph nodes are entirely submitted.       F1: 3 intact lymph nodes   F2: 2 lymph nodes bisected, 1 inked black  F3: 2 lymph nodes bisected, 1 inked black  F4: 2 large lymph nodes bisected, 1 inked black  F5: Largest lymph node trisected  G(7). Other, Right Level 3:  The specimen is received in formalin with proper patient identification, labeled \"right level 3\".  The specimen consists of a 5.5 x 4.0 x 2.5 cm aggregate of yellow-tan adipose tissue.  Palpation reveals 20 possible lymph nodes ranging from 0.3 to 1.9 cm in greatest dimension.  3 lymph nodes are >1.0 cm.  The lymph nodes are entirely submitted.    G1: 6 intact lymph nodes, wrapped  G2: 5 intact lymph nodes  G3: 5 intact lymph nodes  G4: 2 large lymph nodes bisected, 1 inked black  G5: 1 large lymph node bisected  G6: Largest lymph " "node trisected  H(8). Other, Right Level 4:  The specimen is received in formalin with proper patient identification, labeled \"right level 4\".  The specimen consists of a 4.0 x 3.5 x 1.5 cm aggregate of yellow-tan adipose tissue.  Palpation reveals 12 possible lymph nodes ranging from 0.2 to 1.3 cm in greatest dimension.  2 lymph nodes are >1.0 cm.  The lymph nodes are entirely submitted.    H1: 5 intact lymph nodes, wrapped  H2: 5 intact lymph nodes  H3: 2 large lymph nodes bisected, 1 inked black     I(9). Other, Right Level 6:  The specimen is received in formalin with proper patient identification, labeled \"right level 6\".  The specimen consists of a 2.5 x 1.5 x 1.0 cm aggregate of yellow-tan adipose tissue.  Palpation reveals 8 possible lymph nodes ranging from 0.1 to 0.9 cm in greatest dimension.  No lymph nodes are >1.0 cm.  Lymph nodes are entirely scented.    I 1: 5 intact lymph nodes, wrapped  I 2: 3 intact lymph nodes                J(10). Other, Rule out parathyroid biopsy:  The specimen is received fresh for frozen section, with proper patient identification, labeled \"rule out parathyroid biopsy\".  It consists of a 0.5 cm segment of pink-red soft tissue, weighing <0.01 g.  The specimen is submitted entirely for frozen section.  The remainder of the frozen section block is submitted as J1 FS.   Comment: This is an appended report. These results have been appended to a previously preliminary verified report.   Microscopic Description   [facility]   Microscopic examination was performed.Immunohistochemical stains were performed with appropriate controls.  TTF-1 and PAX8 ordered on block B1 highlights a small focus, consistnet with thyroid tissue. Best block for ancillary studies is C2.     A resident/fellow in an [employer]CGME accredited training program was involved in the initial review, preparation, and/or interpretation of this case. I, as the senior physician, attest that I: (i) reviewed the patient clinical " records if indicated; (ii) reviewed the relevant lab test results;(iii) examined the relevant preparation(s) for the specimen(s); and (iv) agree with the report, diagnosis(es), and interpretation as documented by the resident/fellow and edited/confirmed by me.      Resident review by CLARY Aebbe   Comment: This is an appended report. These results have been appended to a previously preliminary verified report.   MCRS N/A Yes Abnormal   [facility]   Comment: This is an appended report.  These results have been appended to a previously preliminary verified report.   Performing Labs   [facility]   The technical component of this testing was completed at [facility]   Comment: This is an appended report. These results have been appended to a previously preliminary verified report.          Fine Needle Aspirate Lymph Node(s): [id number]  Order: [id number]  Collected 2/13/2023 11:17 AM     Status: Final result     Visible to patient: Yes (seen)     0 Result Notes       Component Ref Range & Units    Final Diagnosis   Specimen A                 Interpretation:                  - Metastatic carcinoma compatible with papillary thyroid carcinoma (see comment)                 Adequacy:                 Satisfactory for evaluation         Electronically signed by Katina Grullon MD on 2/17/2023 at  9:22 AM   Comment     Patient's history of papillary thyroid carcinoma is noted. The smear preparations and cell block material demonstrate singly scattered and clusters of malignant cells with Hurthle cell-like features. Immunohistochemical staining performed on the cell block material with appropriately stained controls shows that the lesional cells are strongly positive for TTF-1 and PAX-8, supporting the above diagnosis.    Clinical Information     The patient is a 42 year old transgender female with history of papillary thyroid carcinoma (12/2022)   Rapid Onsite  Evaluation     FNA Performance:   Fine needle aspiration was not performed by [facility] staff.     Aspirate immediate study/adequacy:  DAREN TURNER KHALID, MD, attest that I immediately examined smears while the procedure was underway and determined or confirmed the adequacy of the specimens via telepathology.     It is of note that the final assessment and report may be performed and signed by a different pathologist.     Onsite adequacy/interpretation:  A: adequate      Gross Description     A(A). Lymph Node(s), Right Cervial lymph node:A. Lymph Node(s), Right Cervial lymph node, Fine Needle Aspirate:  Received are 2 fixed slides, processed for Pap stain, 2 air dried slides, processed for Diff Quik stain, and material in formalin, processed for one hematoxylin stained cell block.                  Microscopic Description     Microscopic examination was performed.     A resident/fellow in an [employer] accredited training program was involved in the initial review, preparation, and/or interpretation of this case.  I, as the senior physician, attest that I: (i) reviewed patient clinical records if indicated; (ii) reviewed relevant lab test results; (iii) examined the relevant preparation(s) for the specimen(s); and (iv) agree with the report, diagnosis(es), and interpretation as documented by the resident/fellow and edited/confirmed by me. Reporting resident/fellow: Yanni Gunderson PGY6   Abnormal Result? No Yes Abnormal     Performing Labs     The technical component of this testing was completed at [facility]        Cytology, non-gynecologic: [id number]  Order: [id number]  Collected 12/22/2022  9:19 AM     Status: Edited Result - FINAL     Visible to patient: Yes (seen)     1 Result Note    1 Patient Communication        Component Ref Range & Units  Resulting Agency   Addendum   This case was reviewed by Dr. Grullon we concurred with the original  diagnosis of papillary thyroid carcinoma.   Addendum electronically signed by Katina Grullon MD on 1/16/2023 at  5:39 PM   Final Diagnosis   Specimen A. Thyroid nodule, right ,  FNA:                 Interpretation:                  Papillary thyroid carcinoma (pos malig)                 Adequacy:                 Satisfactory for evaluation         Electronically signed by Dmitry Medina MD on 12/26/2022 at  5:25 PM   Clinical Information   [facility]   42 year old with 5.2 x 4.6 x 4 cmm right thyroid noduleAbnormal imaging   Gross Description   [facility]   A(A). Thyroid, Right, :A. Thyroid, Right, , Fine Needle Aspirate:  Received are 13 fixed slides, all Pap stained. Afirma tube held.                   Microscopic Description   [facility]   The smears show cellular cohesive groups of atypical follicle cells with nuclear grooves and nuclear inclusions without anaplasia with colloid and blood in the background.   Abnormal Result? No Yes Abnormal   [facility]   Performing Labs   [facility]   The technical component of this testing was completed at [facility]            Shefali Diaz MD

## 2023-03-29 NOTE — PROGRESS NOTES
Advise pt I want to get another thyroid US per recommendation of ENT. Test ordered  Endocrine Consult note    Attending Assessment/Plan :      Papillary thyroid carcinoma 5.6 cm, right, +BRAF V600E mutation , extensive angioinvasion, + 14/52 LN including level 2-4, 7, largest metastatic deposit 1 cm   MACIS 5.04 assuming no distant  pT3a,pN1b, cMx  PRIYA recurrence risk intermediate to high  Recommend adjuvant 131I 100 mCi thyrogen stimulated   I have counseled today on the following:  Indications for radioactive iodine and expected outcome  Radioiodine treatment procedure,  Radiation safety precautions  Low iodine diet  Plans for future follow up  We are tentatively setting up thyrogen stimulated treatment for the week of 5/2- see AVS schedule and orders     Addendum  3/29/2023 Tg 1.6, PRIYA < 0.4 , TSH 18.03, free t4 1.17, Ca 9.5, phos 4.4, PTH 19; urine iodine 66 mcg/L, 66 mcg/g creatinine-- increase LT4 to 175 x 7.5/week divided (1.7 mcg/kg/day dose).  Taper off calcitriol   5/4/23 Thyrogen stimulated Tg 2, PRIYA < 0.4, TSh 96.92,   5/4/23 131I 102.7 mCi  5/9/23 post therapy TBS   Next appt 8/22/23    Post surgical hypothyroidism - on LT4 125 mcg/day (1.14 mcg/kg/day).  I expect we will need a dose increase.   Labs today and in 4 months    Low PTH has been present post op with some recovery over short term.  On calcitriol but not calcium without low calcium symptoms.   Labs today.     Transgender female (pronouns she/her hers) on gender affirming hormone therapy estradiol, spironolactone. This is being managed by primary care at [facility].  She is asking us to take over which we can. She reports her urrent estrogen dose is 0.2 ml/week (? 4 mg of 20 mg/ml), not what is on the MAR, which includes multiple orders for estradiol.     BMI 31.8    Exposure to iodine 12/2022.    Spot urine iodine with labs today    Previsit prep time : 0552-3041 AM  _93_ minutes spent on the date of the encounter doing chart review, history and exam, documentation and further activities as noted above.    Shefali DAVIDSON  MD Joe      Chief complaint:  Gordon is a 42 year old transgender female (pronouse she/her/hers) seen in consultation at the request of Dr Krishnamurthy for thyroid cancer.   I have reviewed Care Everywhere including [facility], [facility] lab reports, imaging reports and provider notes as indicated.      HISTORY OF PRESENT ILLNESS    Gordon initially presented with right neck mass concern in November 2022.   Work up was as follows:   12/22/22 FNAB right thyroid nodule papillary thyroid carcinoma  2/13/23 FNAB lymph node metastatic Papillary thyroid carcinoma    Treatment has been as follows:   2/21/2023 total thyroidectomy, right level 2-4, 6, left parathyroid reimplantation x 2 (Dr Krishnamurthy): Papillary thyroid carcinoma 5.6 cm, right, +BRAF V600E mutation , extensive angioinvasion, + 14/52 LN including level 2-4, 7, largest metastatic deposit 1 cm .  She required hospitalization one day.  PTH was low post op. She continues on calcitriol but she is not taking calcium. She is not having low calcium symptoms.  She feels well on the current LT4 dose.      Endocrine relevant labs are as follows:  9/29/20 HgbA1c 5.5  11/16/2020 testosterone 12, estradiol 220  11/11/22 estradiol 138  11/21/22 TSH 1.27, free T4 1.37, T3 134  2/14/2023 Ca 10, creatinine 0.8, testosterone 14  2/21/2023 PTH 4, iCa 4.4  3/6/2023 PTH 12, iCa 4.5  3/20/23 PTH 13, iCa 4.7    Relevant imaging is as follows: (as read by me as it pertains to endocrine relevant organs)  12/13/22 US thyroid  Right thyroid nodule 4.6 x 4 x 5.2 cm iso/hypooechoic , heterogeneous,inferior   Left thyroid nodule # 2 0.5 x 0.7 x 0.8 cm hypoechoic 12/22/22 FNAB + Papillary thyroid carcinoma  Left thyroid nodule # 3 0.8 x 01 x 0.9 cm   12/12/22 CT neck with contrast  4.1 x 5.8 x   1/18/2023 US neck:   Right level 3 # 1 abnormal 0.9 x 0.6 x 2.1 cm- 2/13/23 FNAB + Papillary thyroid carcinoma     On estrogen since age 37;    Reduced hair growth     REVIEW OF SYSTEMS  Sleep  "Ok  Energy Ok  Lost volume  No choking on water  Cardiac: negative  Respiratory:   GI: negative   No paresthesias  No cramps  10 system ROS otherwise as per the HPI or negative    Past Medical History  Past Medical History:   Diagnosis Date     Allergic state      Gender dysphoria      Hypertension      Mixed hyperlipidemia      Papillary thyroid carcinoma (H)      Past Surgical History:   Procedure Laterality Date     IR FINE NEEDLE ASPIRATION W ULTRASOUND  02/13/2023     THYROIDECTOMY N/A 2/21/2023    Procedure: THYROIDECTOMY, TOTAL with right neck dissection; parathyroid reimplantation x2;  Surgeon: Prema Krishnamurthy MD;  Location: [facility] OR     THYROIDECTOMY, TOTAL with right neck dissection; parathyroid reimplantation x2  02/21/2023     Medications  Current Outpatient Medications   Medication Sig Dispense Refill     B-D HYPODERMIC NEEDLE 23G X 1\" MISC Use for estradiol weekly injections 25 each 3     B-D SYRINGE LUER-EUSEBIO 1 ML MISC Use for weekly injections 25 each 1     BD DISP NEEDLES 18G X 1-1/2\" MISC USE FOR WEEKLY ESTRADIOL INJECTIONS. 25 each 3     BD HYPODERMIC NEEDLE 18G X 1\" MISC Use for weekly estradiol injections 25 each 3     calcitRIOL (ROCALTROL) 0.25 MCG capsule Take 1 capsule (0.25 mcg) by mouth 2 times daily 60 capsule 1     calcium carbonate (TUMS) 500 MG chewable tablet Take 5 tablets (2,500 mg) by mouth 3 times daily 300 tablet 0     estradiol valerate (DELESTROGEN) 20 MG/ML injection INJECT 1 ML (20MG) INTO THE MUSLE EVERY 28 DAYS 5 mL 1     estradiol valerate (DELESTROGEN) 20 MG/ML injection INJECT 1 ML (20MG) INTO THE MUSLE EVERY 28 DAYS 5 mL 0     estradiol valerate (DELESTROGEN) 20 MG/ML injection Inject 1 mL (20 mg) into the muscle every 28 days 1 mL 3     estradiol valerate (DELESTROGEN) 20 MG/ML injection INJECT 0.2ML INTRAMUSCULARLY ONCE A WEEK. DISCARD VIAL 28 DAYS AFTER FIRST USE 5 mL 0     fluticasone (FLONASE) 50 MCG/ACT nasal spray SPRAY 1 SPRAY INTO BOTH NOSTRILS DAILY 15.8 mL 1 "     levothyroxine (SYNTHROID/LEVOTHROID) 125 MCG tablet Take 1 tablet (125 mcg) by mouth every morning (before breakfast) 60 tablet 3     lisinopril (ZESTRIL) 10 MG tablet TAKE 1 TABLET (10 MG) BY MOUTH DAILY. (PLEASE SCHEDULE YOUR ANNUAL CHECK UP FOR NOVEMBER 2022) 90 tablet 1     mineral oil-hydrophilic petrolatum (AQUAPHOR) external ointment Apply topically every 8 hours Apply to incision. 50 g 1     oxyCODONE (ROXICODONE) 5 MG tablet Take 1 tablet (5 mg) by mouth every 4 hours as needed for moderate pain (4-6) 20 tablet 0     sennosides (SENOKOT) 8.6 MG tablet Take 1 tablet by mouth daily as needed for constipation 20 tablet 0     spironolactone (ALDACTONE) 100 MG tablet Take 1 tablet (100 mg) by mouth daily 90 tablet 3       Allergies  No Known Allergies      Family History  Family History   Problem Relation Age of Onset     Asthma Brother      Hyperthyroidism Maternal Grandmother      Prostate Cancer Maternal Grandfather 98     Thyroid Cancer No family hx of      Social History  Social History     Tobacco Use     Smoking status: Some Days     Smokeless tobacco: Never   Vaping Use     Vaping Use: Never used   Substance Use Topics     Alcohol use: Yes     Comment: occ     Drug use: No     Lives alone; employed; taking off work for treatment is not anticipated to be a problem    Physical Exam  GENERAL Pleasant , in NAD;  mask; voice is normal, deep.   BP (!) 147/80   Pulse 83   Wt 111.6 kg (246 lb)   SpO2 100%   BMI 32.46 kg/m    SKIN: normal color, temperature, texture  HEENT: PER, no scleral icterus, eyelid retraction, stare, lid lag, proptosis or conjunctival injection.  .    NECK: low transverse and right lateral neck longitudinal healed surgical scar.    LUNGS: clear to auscultation bilaterally.   CARDIAC: RRR, S1, S2 without murmurs, rubs or gallops.    BACK: normal spinal contour.    NEURO: Alert, responds appropriately to questions,  moves all extremities, DTRs 2/4, gait normal, no tremor of the  outstretched hand    DATA REVIEW    Surgical Pathology Exam: [id number]  Order: [id number]   Collected 2/21/2023  8:53 AM      Status: Final result      Visible to patient: Yes (seen)      0 Result Notes        Component Ref Range & Units  Resulting Agency   Case Report   Surgical Pathology Report                         Case: [id number]                                   Authorizing Provider:  Prema Krishnamurthy MD     Collected:           02/21/2023 08:53 AM           Ordering Location:      MAIN OR                 Received:            02/21/2023 01:14 PM           Pathologist:           Slime Fagan MD                                                                            Intraop:               Katina Grullon MD                                                              Specimens:   A) - Other, Delphian node                                                                            B) - Other, Rule out thyroid mets vs parathyroid gland                                               C) - Thyroid, Total thyroidectomy; stitch on Left Lobe                                               D) - Other, Left inferior parathyroid biopsy                                                         E) - Other, Right inferior parathyroid biopsy                                                        F) - Other, Right Level 2A                                                                           G) - Other, Right Level 3                                                                            H) - Other, Right Level 4                                                                            I) - Other, Right Level 6                                                                            J) - Other, Rule out parathyroid biopsy                                                    Final Diagnosis   A.  LYMPH NODES, DELPHIAN NODE,  EXCISION:  - METASTATIC PAPILLARY THYROID CARCINOMA TO ONE OUT OF ONE LYMPH NODE (1/1)  - Tumor deposit size: 2 mm in greatest dimension  - No extranodal extension      B. PARATHYROID GLAND, BIOPSY:  - Crushed fibroconnective tissue with a small fragment of thyroid tissue     C.  THYROID GLAND, TOTAL THYROIDECTOMY:  - PAPILLARY THYROID CARCINOMA, CLASSIC/CONVENTIONAL TYPE INVOLVING RIGHT THYROID LOBE     - Tumor size: 5.6 cm in greatest dimension, multifocal     - Angioinvasion is present, extensive     - No extrathyroidal extension identified     - Margins are negative (<1 mm).     - Non neoplastic thyroid tissue with benign adenomatoid nodules   - METASTATIC CARCINOMA TO TWO OUT OF TWO PERITHYROIDAL LYMPH NODES (2/2)     - Tumor deposit size: 5 mm in greatest dimension     - No extranodal extension identified   - AJCC pathologic stage mpT3a N1b     D.  PARATHYROID GLAND, LEFT INFERIOR PARATHYROID, BIOPSY:  - Benign parathyroid gland, negative for malignancy     E.  PARATHYROID GLAND, RIGHT INFERIOR PARATHYROID, BIOPSY:  - METASTATIC CARCINOMA TO ONE OUT OF ONE LYMPH NODE (1/1)  - Tumor deposit size: 2 mm in greatest dimension  - No extranodal extension identified      F.  LYMPH NODES, RIGHT LEVEL 2A, NECK DISSECTION:  - METASTATIC CARCINOMA TO ONE OUT OF TEN LYMPH NODES (1/10)  - Tumor deposit: 1 mm in greatest dimension  - No extranodal extension identified     G.  LYMPH NODES, RIGHT LEVEL 3, NECK DISSECTION:  - METASTATIC CARCINOMA TO THREE OUT OF EIGHTEEN LYMPH NODES (3/18)  - Tumor deposit size: 10 mm in greatest dimension  - No extranodal extension identified     H.  LYMPH NODES, RIGHT LEVEL 4, NECK DISSECTION:  -METASTATIC CARCINOMA TO ONE LYMPH NODE OUT OF THIRTEEN (1/13)  - Tumor deposit size:  4 mm in greatest dimension  - No extranodal extension identified      I.  LYMPH NODES, RIGHT LEVEL 6, NECK DISSECTION:  - METASTATIC CARCINOMA TO FIVE LYMPH NODES OUT OF SIX (5/6)  - Tumor deposit size: 5 mm in  greatest dimension  - No extranodal extension identified     J. PARATHYROID GLAND, BIOPSY:  - Benign parathyroid tissue, negative for malignancy   Electronically signed by Slime Fagan MD on 2/28/2023 at 10:37 AM         Comments:   This is an appended report. These results have been appended to a previously preliminary verified report.      Comment   [facility]   Immunohistochemical stain performed in block C2 for BRAF V600E mutation is present in the tumor cells. Control is adequate  Case was reviewed by the following resident: DANIELLE TRAN   Comment: This is an appended report. These results have been appended to a previously preliminary verified report.   Synoptic Checklist   THYROID GLAND  8th Edition - Protocol posted: 6/30/2021  THYROID GLAND: RESECTION - All Specimens  SPECIMEN   Procedure  Total thyroidectomy    TUMOR   Tumor Focality  Multifocal    Tumor Characteristics     Tumor Site  Right lobe    Tumor Size  Greatest Dimension (Centimeters): 5.6 cm   Histologic Type  Papillary carcinoma, classic (usual, conventional)    Angioinvasion (vascular invasion)  Extensive (4 or more vessels)    Lymphatic Invasion  Not identified    Perineural Invasion  Not identified    Extrathyroidal Extension  Not identified    Margin Status  All margins negative for carcinoma    Distance from Invasive Carcinoma to Closest Margin  Less than 1 mm    REGIONAL LYMPH NODES   Regional Lymph Node Status  Tumor present in regional lymph node(s)    Number of Lymph Nodes with Tumor  14    Connie Level(s) Involved  Level VI      Right Lateral Level II      Right Lateral Level III      Right Lateral Level IV    Size of Largest Metastatic Deposit  1.0 cm   Extranodal Extension (BLANCA)  Not identified    Number of Lymph Nodes Examined  52    Connie Level(s) Examined  Level VII      Right Lateral Level II      Right Lateral Level III      Right Lateral Level IV    PATHOLOGIC STAGE CLASSIFICATION (pTNM, AJCC 8th Edition)  "  Reporting of pT, pN, and (when applicable) pM categories is based on information available to the pathologist at the time the report is issued. As per the AJCC (Chapter 1, 8th Ed.) it is the managing physician s responsibility to establish the final pathologic stage based upon all pertinent information, including but potentially not limited to this pathology report.   TNM Descriptors  m (multiple primary tumors)         pT Category  pT3a    pN Category  pN1b    ADDITIONAL FINDINGS   Additional Findings  Adenomatoid nodule(s) or nodular follicular disease      Parathyroid gland(s) present    Comment(s)  Best block for ancillary studies: C2    .      Clinical Information   [facility]   42-year-old adult with history of U/S guided FNA proven papillary thyroid cancer.   Comment: This is an appended report. These results have been appended to a previously preliminary verified report.   Intraoperative Consultation   JANEY BONILLA(2). Other, Rule out thyroid mets vs parathyroid gland:  BFS1:  - Minute fragment of crushed fibroconnective tissue; no evidence of malignancy        D(4). Other, Left inferior parathyroid biopsy:  DFS1:  - Parathyroid gland        E(5). Other, Right inferior parathyroid biopsy:  EFS1:  - Lymph node with metastatic PTC        J(10). Other, Rule out parathyroid biopsy:  JFS1:  - Parathyroid gland identified         Gross Description   [facility]FR[facility]   A(1). Other, Delphian node:  The specimen is received in formalin with proper patient identification, labeled \"delphian node\".  The specimen consists of a 2.5 x 2.0 x 0.5 cm aggregate of tan-yellow adipose tissue.  Palpation reveals 1 possible node measuring 0.5 cm in greatest dimension.  It is bisected, wrapped and entirely submitted in cassette A1.             B(2). Other, Rule out thyroid mets vs parathyroid gland:  The specimen is received fresh for frozen section, with proper patient identification, labeled \"rule out thyroid mets versus parathyroid gland\".  " "It consists of 2 pieces of pink-tan soft tissue each measuring 0.2 cm in greatest dimension and weighing <0.01 g.  The specimen is submitted entirely for frozen section.  The remainder of the frozen section block is submitted as B1 FS.  C(3). Thyroid, Total thyroidectomy; stitch on Left Lobe:  The specimen is received fresh with proper patient identification labeled \"total thyroidectomy\".  The specimen consists of an 83.7 g total thyroid gland, measuring 7.8 cm (right to left) by 6.8 cm (superior-inferior) by 4.8 cm (anterior to posterior).  A stitch is present designating the left lobe.  The anterior surface of the right lobe is surgically roughened.  The remaining external surface is intact.  The specimen is inked: Anterior surface blue, posterior surface black.     The right lobe is 6.8 cm (superior to inferior) by 4.9 cm (lateral to medial) by 4.0 cm (anterior to posterior).  It is sectioned from superior to inferior aspect to reveal a 5.6 x 4.7 x 3.6 cm partially encapsulated white-tan firm mass, located in the mid to inferior aspect.  Further sectioning reveals central necrosis.  The remaining superior parenchyma is brown and homogenous without additional masses or nodules.     The isthmus (2.8 x 1.4 x 1.0 cm) is red-brown and homogenous without masses or nodules.     The left lobe is 5.6 cm (superior to inferior) by 3.2 cm (lateral to medial) by 2.0 cm (anterior to posterior).  It is serially sectioned from superior to inferior aspect to reveal 2 tan unencapsulated adjacent nodules ranging from 0.3 to 0.8 cm in greatest dimension, located in the mid aspect.  The remaining parenchyma is tan-red and homogenous without masses.  Representative sections are submitted.      C1: Uninvolved superior right lobe  C2-C7: Right lobe mass with partial capsule  C8: Uninvolved isthmus  C9-C10: Uninvolved superior left lobe  C11-C12: Left lobe nodules, totally submitted  D(4). Other, Left inferior parathyroid biopsy:  The " "specimen is received fresh for frozen section, with proper patient identification, labeled \"left inferior parathyroid biopsy\".  It consists of a 0.3 cm segment of pink-tan soft tissue, weighing <0.01 g.  The specimen is submitted entirely for frozen section.  The remainder of the frozen section block is submitted as D1 FS.  E(5). Other, Right inferior parathyroid biopsy:  The specimen is received fresh for frozen section, with proper patient identification, labeled \"right inferior parathyroid biopsy\".  It consists of a 0.4 cm segment of pink-tan soft tissue, weighing <0.01 g.  The specimen is submitted entirely for frozen section.  The remainder of the frozen section block is submitted as E1 FS.  F(6). Other, Right Level 2A:  The specimen is received in formalin with proper patient identification, labeled \"right level 2A\".  The specimen consists of a 4.0 x 2.5 x 1.5 cm aggregate of tan soft tissue.  Palpation reveals 10 possible nodes ranging from 0.1 to 1.4 cm in greatest dimension.  3 lymph nodes are >1.0 cm.  The lymph nodes are entirely submitted.       F1: 3 intact lymph nodes   F2: 2 lymph nodes bisected, 1 inked black  F3: 2 lymph nodes bisected, 1 inked black  F4: 2 large lymph nodes bisected, 1 inked black  F5: Largest lymph node trisected  G(7). Other, Right Level 3:  The specimen is received in formalin with proper patient identification, labeled \"right level 3\".  The specimen consists of a 5.5 x 4.0 x 2.5 cm aggregate of yellow-tan adipose tissue.  Palpation reveals 20 possible lymph nodes ranging from 0.3 to 1.9 cm in greatest dimension.  3 lymph nodes are >1.0 cm.  The lymph nodes are entirely submitted.    G1: 6 intact lymph nodes, wrapped  G2: 5 intact lymph nodes  G3: 5 intact lymph nodes  G4: 2 large lymph nodes bisected, 1 inked black  G5: 1 large lymph node bisected  G6: Largest lymph node trisected  H(8). Other, Right Level 4:  The specimen is received in formalin with proper patient " "identification, labeled \"right level 4\".  The specimen consists of a 4.0 x 3.5 x 1.5 cm aggregate of yellow-tan adipose tissue.  Palpation reveals 12 possible lymph nodes ranging from 0.2 to 1.3 cm in greatest dimension.  2 lymph nodes are >1.0 cm.  The lymph nodes are entirely submitted.    H1: 5 intact lymph nodes, wrapped  H2: 5 intact lymph nodes  H3: 2 large lymph nodes bisected, 1 inked black     I(9). Other, Right Level 6:  The specimen is received in formalin with proper patient identification, labeled \"right level 6\".  The specimen consists of a 2.5 x 1.5 x 1.0 cm aggregate of yellow-tan adipose tissue.  Palpation reveals 8 possible lymph nodes ranging from 0.1 to 0.9 cm in greatest dimension.  No lymph nodes are >1.0 cm.  Lymph nodes are entirely scented.    I 1: 5 intact lymph nodes, wrapped  I 2: 3 intact lymph nodes                J(10). Other, Rule out parathyroid biopsy:  The specimen is received fresh for frozen section, with proper patient identification, labeled \"rule out parathyroid biopsy\".  It consists of a 0.5 cm segment of pink-red soft tissue, weighing <0.01 g.  The specimen is submitted entirely for frozen section.  The remainder of the frozen section block is submitted as J1 FS.   Comment: This is an appended report. These results have been appended to a previously preliminary verified report.   Microscopic Description   [facility]   Microscopic examination was performed.Immunohistochemical stains were performed with appropriate controls.  TTF-1 and PAX8 ordered on block B1 highlights a small focus, consistnet with thyroid tissue. Best block for ancillary studies is C2.     A resident/fellow in an ACGME accredited training program was involved in the initial review, preparation, and/or interpretation of this case. I, as the senior physician, attest that I: (i) reviewed the patient clinical records if indicated; (ii) reviewed the relevant lab test results;(iii) examined the relevant " preparation(s) for the specimen(s); and (iv) agree with the report, diagnosis(es), and interpretation as documented by the resident/fellow and edited/confirmed by me.      Resident review by CLARY Abebe   Comment: This is an appended report. These results have been appended to a previously preliminary verified report.   MCRS N/A Yes Abnormal   [facility]   Comment: This is an appended report.  These results have been appended to a previously preliminary verified report.   Performing Labs   [facility]   The technical component of this testing was completed at [facility]   Comment: This is an appended report. These results have been appended to a previously preliminary verified report.          Fine Needle Aspirate Lymph Node(s): [id number]  Order: [id number]   Collected 2/13/2023 11:17 AM      Status: Final result      Visible to patient: Yes (seen)      0 Result Notes       Component Ref Range & Units    Final Diagnosis   Specimen A                 Interpretation:                  - Metastatic carcinoma compatible with papillary thyroid carcinoma (see comment)                 Adequacy:                 Satisfactory for evaluation         Electronically signed by Katina Grullon MD on 2/17/2023 at  9:22 AM   Comment     Patient's history of papillary thyroid carcinoma is noted. The smear preparations and cell block material demonstrate singly scattered and clusters of malignant cells with Hurthle cell-like features. Immunohistochemical staining performed on the cell block material with appropriately stained controls shows that the lesional cells are strongly positive for TTF-1 and PAX-8, supporting the above diagnosis.    Clinical Information     The patient is a 42 year old transgender female with history of papillary thyroid carcinoma (12/2022)   Rapid Onsite Evaluation     FNA Performance:   Fine needle aspiration was not performed by [facility]  staff.     Aspirate immediate study/adequacy:  IDAREN KHALID, MD, attest that I immediately examined smears while the procedure was underway and determined or confirmed the adequacy of the specimens via telepathology.     It is of note that the final assessment and report may be performed and signed by a different pathologist.     Onsite adequacy/interpretation:  A: adequate      Gross Description     A(A). Lymph Node(s), Right Cervial lymph node:A. Lymph Node(s), Right Cervial lymph node, Fine Needle Aspirate:  Received are 2 fixed slides, processed for Pap stain, 2 air dried slides, processed for Diff Quik stain, and material in formalin, processed for one hematoxylin stained cell block.                  Microscopic Description     Microscopic examination was performed.     A resident/fellow in an ACGME accredited training program was involved in the initial review, preparation, and/or interpretation of this case.  I, as the senior physician, attest that I: (i) reviewed patient clinical records if indicated; (ii) reviewed relevant lab test results; (iii) examined the relevant preparation(s) for the specimen(s); and (iv) agree with the report, diagnosis(es), and interpretation as documented by the resident/fellow and edited/confirmed by me. Reporting resident/fellow: Yanni Gunderson PGY6   Abnormal Result? No Yes Abnormal     Performing Labs     The technical component of this testing was completed at [facility]        Cytology, non-gynecologic: [id number]  Order: [id number]   Collected 12/22/2022  9:19 AM      Status: Edited Result - FINAL      Visible to patient: Yes (seen)      1 Result Note     1 Patient Communication        Component Ref Range & Units  Resulting Agency   Addendum   This case was reviewed by Dr. Grullon we concurred with the original diagnosis of papillary thyroid carcinoma.   Addendum electronically signed by Katina Grullon MD  on 1/16/2023 at  5:39 PM   Final Diagnosis   Specimen A. Thyroid nodule, right ,  FNA:                 Interpretation:                  Papillary thyroid carcinoma (pos malig)                 Adequacy:                 Satisfactory for evaluation         Electronically signed by Dmitry Medina MD on 12/26/2022 at  5:25 PM   Clinical Information   [facility]   42 year old with 5.2 x 4.6 x 4 cmm right thyroid noduleAbnormal imaging   Gross Description   [facility]   A(A). Thyroid, Right, :A. Thyroid, Right, , Fine Needle Aspirate:  Received are 13 fixed slides, all Pap stained. Afirma tube held.                   Microscopic Description   [facility]   The smears show cellular cohesive groups of atypical follicle cells with nuclear grooves and nuclear inclusions without anaplasia with colloid and blood in the background.   Abnormal Result? No Yes Abnormal   [facility]   Performing Labs   [facility]   The technical component of this testing was completed at [facility]        Examination:  NM THYROID ABLATION THERAPY I 131  5/4/2023 9:08 AM Comparison:  Ultrasound 1/18/2023.History:  Papillary thyroid carcinoma (H); Postoperativehypothyroidism  Additional Information: Papillary thyroid carcinoma?5.6 cm, right,+BRAF V600E mutation , extensive angioinvasion, + 14/52 LN including level 2-4, 7, largest metastatic deposit 1 cm.  Procedure: After confirming the identity of the patient by independent sources, and after consultation with the ordering physician  the risks and benefits of I-131 thyroid therapy were discussed at length with the patient, and all questions were answered.102.7 mCi of I-131 were given by mouth to the patient.                                                            Impression: 102.7 mCi I-131 therapeutic treatment for papillary thyroid cancer.  I have personally reviewed the examination and initial interpretationand I agree  with the findings.  SAYRA MAXWELL MD     EXAMINATION: NM THYROID POST ABLATION WB NO IODINENuclear medicine whole body  7 day post therapy I-131scan, on 5/9/202312:19 PM.  CLINICAL HISTORY:  Papillary thyroid carcinoma (H); Postoperativehypothyroidism   ADDITIONAL INFORMATION:Papillary thyroid carcinoma?5.6 cm, right,+BRAF V600E mutation , extensive angioinvasion, + 14/52 LN includinglevel 2-4, 7, largest metastatic deposit 1 cm.  TECHNIQUE:  The patient came for 5 day post I-131 therapy scan. Whole body images were done on anterior and posterior projections. Additional SPECT-CTimages of the neck were obtained, but due to patient's rotation duringthe scan, they are not helpful for the interpretation.  FINDINGS: Technically the patient rotated to her head during theSPECT-CT which makes alignment of images for precise localizationdifficult.  Whole body images demonstrates uptake within the right soft tissuesgreater than left neck, concerning for metastatic disease.   Limited CT demonstrates postsurgical changes of thyroidectomy and right neck dissection. The paranasal sinuses and mastoid air cells areclear. Bilateral lungs are remarkable.  Normally increased tracer uptake is noted in the oral cavity, liver,stomach and urinary bladder.                                                            IMPRESSION:Whole body images demonstrates uptake within the rightgreater than left neck, concerning for metastatic disease. Due to  patient's rotation during the scan, SPECT-CT images are not helpful  for anatomic localization.  I have personally reviewed the examination and initial interpretation  and I agree with the findings.  ANA MARÍA HAMEED MD

## 2023-03-29 NOTE — PATIENT INSTRUCTIONS
Labs today and every 4 months for the next year  I will send results on TRUECarhart   See me in approximately 4 months with labs about 2 weeks prior to the appt        You should continue taking your usual dose of levothyroxine throughout the schedule noted below    Start low iodine diet < 50 mcg/day.                                        WED 4/19/2023  (low iodine recipes at [email or web address])    Thyrogen 0.9 mg IM Dose # 1           Tuesday afternoon 5/2/2023  [facility] [place] [place]  [place]inology clinic, 3rd  Floor  [address] [place] [id number]      Thyrogen 0.9 mg IM Dose # 2                                     Wednesday afternoon 5/3/2023  [facility] [place] [place]  Endocrinology clinic, 3rd  Floor  [address] [place] [id number]      Lab Work                                                              Before 8:30 AM, Thursday 5/4/2023 (Tg, TSH, CBC)  [facility] [place] [place] ,  First floor  [address] [place] [id number]    Radioiodine dosing                           Thursday 5/4/2023   2nd floor of [facility], Radiology Department  After this, you must go directly home, using your own vehicle.  Ideally you drive yourself. Do not go in public places.      Start radiation isolation at home                                             Immediately after treatment on Thursday  Stop low iodine diet           Saturday 5/6/2023  Stop radiation isolation                Tuesday, 5/9/2023    Post therapy scan       Tuesday, 5/9/2023   2nd floor of [facility], Radiology Department    In order to minimize exposure to radiation to others from the radioiodine inside your body, you should do the following for 5 total days after discharge, ending __________    Sleep alone.  Kissing and sexual intercourse should be avoided  Avoid prolonged close contact with young children and pregnant women.  Wash your  hands with soap and water after each visit to the bathroom  Flush the toilet 2 times after each use  Rinse the bathroom sink and tub after use  Flush used tissue (from blowing nose or any wipe-ups) down the toilet  Drink plenty of liquids to assist in the removal of radioactive material that are circulating in the blood stream  Use separate eating utensils and wash them separately  Use separate towels and wash cloths and wash them separately to avoid cross contamination.  Avoid public transportation      Iodine content of foods (in micrograms / serving).      You should restrict to under 50 mcg/day     Dairy  Seafood  Vegetables   Fruit    Milk (8 oz) 14.2 Clams, 1/2 c 90 Spinach, canned 9.9 Peach, canned 16 vs 5   Ice cream (1/2 cup) 9 Shrimp, 3 oz 55 Spinach, raw 2.0 applesauce 14.1   Cottage cheese, 4 oz 5.7 Oysters, 5-8 38 Potato, skin 3.3 Pineapple, 3 c 10.7   Mayonnaise, 1 Tbs 3.4 Halibut, 3 oz 39 Squash, small 3.3 Banana, 1 small 7   Cheese, American processed 2.6 [place], 3 oz 31 Lettuce, 1/2 cup 3.1 Apple, raw, 1 small 2.0   Cheese, blue/[place], 1 oz 2.6 Sardines, 2 10 Potatoes, mashed 2.5 Apple juice 2   Cheese, Cheddar, 1 oz 2.6 Tuna, canned, 2 oz 10 Brocoli, 1/2 cup 2.1 cantaloupe 1.7   Cheese sauce, 2 Tbs 2.5   Peas, 1/2 c 2.1 Orange juice 1.0    Milk, dry nonfat soilds, instant, 4 Tbs 2.0 Starch, Bread  French fries, 10 4.6 raisins 0.7   sherbet 1.6 Pancake, 2 4.5 Avocado, 1 med 1.7 oranges 0.0   Cream cheese, 1 oz 0.9 Rye bread 4.2 cucumber 1.7 Legumes and nuts    Half and half, 1 Tbs 0.9   Apple pie 3.2 Potato, boiled 1.7 Beans, red 2.1   Cream, light 0.7 Bisquick 2.6 Corn, creamed, 3 oz 1.4 Split pea soup 1.7     Whole wheat bread 1.7 Peas, canned 1/2 c 1.4 Peanuts with skin 1.4   Other  Miscellaneous  cornbread 1.7 Cabbage, raw 1.1 Cashews, 6-8 0.4   pizza 15 Pound cake 1.7 Onions 1/4 c 1.1 Walnuts, 8 halves 0.4   Chili 9.3 Dough cake 1.7 Mixed vegetables, 1/2 c 0.7     Pork, lean, 3 oz 7.1  [place]ian bread 1.5       Egg, one hardboiled 6.4 Will food cake 1.1       Spaghetti, 1/2 c and meatballs 5.5 Egg noodles 0.7       Beef, patrick, 3 oz 4.2 Rice, whole 1/2 cup 0.6       Hamburger, 3 oz 4.2         Turkey, 3 oz 4.2 Spaghetti, 1/2 cup 0.6       Veal, 3 oz 4.2 Rolando crackers 0.1       Kumar cured 3.6         Salami, 1 oz 1.7         Liver, 3 oz  2.1         Lamb leg, 3 oz 2.1         Pork sausage, 2 links 2.7         Frankfurter  3.0

## 2023-03-30 ENCOUNTER — TELEPHONE (OUTPATIENT)
Dept: ENDOCRINOLOGY | Facility: CLINIC | Age: 43
End: 2023-03-30
Payer: COMMERCIAL

## 2023-03-30 RX ORDER — LEVOTHYROXINE SODIUM 175 UG/1
TABLET ORAL
Qty: 96 TABLET | Refills: 4 | Status: SHIPPED | OUTPATIENT
Start: 2023-03-30 | End: 2023-08-22 | Stop reason: DRUGHIGH

## 2023-03-30 NOTE — TELEPHONE ENCOUNTER
Pt notified of dates for THORPE.   CAM PA initiated.   No pregnancy test needed re Transgender female on gender affirming hormone therapy estradiol    ----- Message from Ash Gonsalez CMA sent at 3/30/2023 11:41 AM CDT -----  Regarding: thyrogen  Hi     Please Set up Thyrogen and lab appts and radioiodine treatment as indicated on the AVS Per Joe

## 2023-03-31 LAB — MAYO MISC RESULT: NORMAL

## 2023-04-02 ENCOUNTER — HEALTH MAINTENANCE LETTER (OUTPATIENT)
Age: 43
End: 2023-04-02

## 2023-04-04 ENCOUNTER — TELEPHONE (OUTPATIENT)
Dept: ENDOCRINOLOGY | Facility: CLINIC | Age: 43
End: 2023-04-04

## 2023-04-04 NOTE — TELEPHONE ENCOUNTER
Diet, schedule and Isolation precautions mailed to pt via [employer].   Radha Lopez RN on 4/4/2023 at 12:02 PM

## 2023-04-05 LAB — SCANNED LAB RESULT: NORMAL

## 2023-04-10 PROBLEM — Z77.098 EXPOSURE TO IODINE: Status: ACTIVE | Noted: 2023-04-10

## 2023-04-10 PROBLEM — Z78.9 MALE-TO-FEMALE TRANSGENDER PERSON: Status: ACTIVE | Noted: 2023-04-10

## 2023-04-10 PROBLEM — E89.0 POSTOPERATIVE HYPOTHYROIDISM: Status: ACTIVE | Noted: 2023-04-10

## 2023-04-10 PROBLEM — R79.89 LOW SERUM PARATHYROID HORMONE (PTH): Status: ACTIVE | Noted: 2023-04-10

## 2023-04-12 ENCOUNTER — HOSPITAL ENCOUNTER (EMERGENCY)
Facility: CLINIC | Age: 43
Discharge: HOME | End: 2023-04-12
Attending: EMERGENCY MEDICINE | Admitting: EMERGENCY MEDICINE
Payer: COMMERCIAL

## 2023-04-12 ENCOUNTER — APPOINTMENT (OUTPATIENT)
Dept: ULTRASOUND IMAGING | Facility: CLINIC | Age: 43
End: 2023-04-12
Attending: EMERGENCY MEDICINE
Payer: COMMERCIAL

## 2023-04-12 VITALS
RESPIRATION RATE: 18 BRPM | HEART RATE: 95 BPM | DIASTOLIC BLOOD PRESSURE: 84 MMHG | SYSTOLIC BLOOD PRESSURE: 133 MMHG | OXYGEN SATURATION: 99 % | TEMPERATURE: 97 F

## 2023-04-12 DIAGNOSIS — R20.2 PARESTHESIA OF LEFT FOOT: ICD-10-CM

## 2023-04-12 PROCEDURE — 93971 EXTREMITY STUDY: CPT | Mod: LT

## 2023-04-12 PROCEDURE — 99284 EMERGENCY DEPT VISIT MOD MDM: CPT | Mod: 25

## 2023-04-12 ASSESSMENT — ACTIVITIES OF DAILY LIVING (ADL): ADLS_ACUITY_SCORE: 33

## 2023-04-12 NOTE — ED PROVIDER NOTES
History     Chief Complaint:  Tingling     HPI   Gordon Stack is a 42 year old adult who presents with tingling in their left foot and behind the knee. The patient started to have constant tingling in their left foot started at around 1400. Their symptoms worsens with walking. The tingling is in their whole foot and behind the left knee. They deny trauma, falls, or leg swelling.    Review of External Notes: None     Review of Systems   Cardiovascular: Negative for leg swelling.   Neurological:        Tingling   All other systems reviewed and are negative.    Allergies:  No Known Allergies     Medications:    calcitriol  estradiol valerate   fluticasone   levothyroxine   lisinopril   spironolactone     Past Medical History:    Gender dysphoria  Hypertension  Hyperlipidemia  Papillary thyroid carcinoma     Past Surgical History:    IR fine needle aspiration with ultrasound  Thyroidectomy     Social History:  +Smoking.  PCP: Joby Aldana     Physical Exam     Patient Vitals for the past 24 hrs:   BP Temp Temp src Pulse Resp SpO2   04/12/23 0100 133/84 -- -- 95 -- 99 %   04/12/23 0052 143/82 97  F (36.1  C) Temporal 80 -- --   04/12/23 0051 -- -- -- -- 18 100 %      Physical Exam  Nursing note and vitals reviewed.  Constitutional: Cooperative.   Cardiovascular: Normal rate, regular rhythm. 2+ left DP/PT pulses.  Pulmonary/Chest: Effort normal  Musculoskeletal: Normal range of motion of LLE, no edema.   Neurological: Alert. normal strength and sensation in LE's.  Skin: Skin is warm and dry. No rash noted in LLE.  Psychiatric: Normal mood and affect.     Emergency Department Course   Imaging:  US Lower Extremity Venous Duplex Left   Final Result   IMPRESSION:   1.  No deep venous thrombosis in the left lower extremity.      Report per radiology    Interventions:  Medications - No data to display     Independent Interpretation (X-rays, CTs, rhythm strip):  None    Assessments:  0116 I obtained history and  examined patient.  0150 I rechecked the patient and explained findings.     Disposition:  The patient was discharged to home.     Impression & Plan      Medical Decision Making:  Gordon Stack is a 42 year old adult who presents for evaluation of a tingling sensation in their left foot.  A broad differential for the paresthesias was considered including acute CVA, demyelinating disease, nerve compression, developing neuropathy, early shingles, early manifestation of progressive nerve inflammation such as Guillain-Conklin, etc.  The workup here shows an normal neurologic examination other than the paresthesias noted above in exam.   Stable for outpatient discharge. No popliteal cyst.  Return if blisters develop, progressive paresthesias, other stroke or neuro symptoms.  Questions answered.      Diagnosis:    ICD-10-CM    1. Paresthesia of left foot  R20.2          Scribe Disclosure:  Wes TURNERed, am serving as a scribe at 1:26 AM on 4/12/2023 to document services personally performed by Zaki Travis MD based on my observations and the provider's statements to me.    4/12/2023   Zaki Travis MD Amdahl, John, MD  04/12/23 0255

## 2023-04-12 NOTE — ED TRIAGE NOTES
Arrives from home. States his left foot is numb or tingling, states can feel it tingling. Denies trauma to the area. States he has never experiences anything like this prior to this. Denies attempting any OTC remedies to assist with tingling feeling.

## 2023-04-19 DIAGNOSIS — F64.0 GENDER DYSPHORIA IN ADULT: ICD-10-CM

## 2023-04-27 ENCOUNTER — TELEPHONE (OUTPATIENT)
Dept: ENDOCRINOLOGY | Facility: CLINIC | Age: 43
End: 2023-04-27
Payer: COMMERCIAL

## 2023-04-27 DIAGNOSIS — F64.0 GENDER DYSPHORIA IN ADULT: ICD-10-CM

## 2023-05-02 ENCOUNTER — ALLIED HEALTH/NURSE VISIT (OUTPATIENT)
Dept: ENDOCRINOLOGY | Facility: CLINIC | Age: 43
End: 2023-05-02
Payer: COMMERCIAL

## 2023-05-02 DIAGNOSIS — C73 PAPILLARY THYROID CARCINOMA (H): Primary | ICD-10-CM

## 2023-05-02 PROCEDURE — 96372 THER/PROPH/DIAG INJ SC/IM: CPT

## 2023-05-02 PROCEDURE — 99207 PR NO BILLABLE SERVICE THIS VISIT: CPT

## 2023-05-02 NOTE — NURSING NOTE
Clinic Administered Medication Documentation      Thyrogen     Name of provider who requested the medication administration: Joe   Name of provider on site (faculty or community preceptor) at the time of performing the medication administration: JEREMÍAS Neal      Date of next administration: 5/2/2023  Date of next office visit with provider to renew medication plan (must be seen annually): 5/3/2023

## 2023-05-03 ENCOUNTER — ALLIED HEALTH/NURSE VISIT (OUTPATIENT)
Dept: ENDOCRINOLOGY | Facility: CLINIC | Age: 43
End: 2023-05-03
Payer: COMMERCIAL

## 2023-05-03 DIAGNOSIS — C73 PAPILLARY THYROID CARCINOMA (H): Primary | ICD-10-CM

## 2023-05-03 PROCEDURE — 99207 PR NO BILLABLE SERVICE THIS VISIT: CPT

## 2023-05-03 PROCEDURE — 96372 THER/PROPH/DIAG INJ SC/IM: CPT

## 2023-05-03 RX ORDER — NEEDLES, DISPOSABLE 25GX5/8"
NEEDLE, DISPOSABLE MISCELLANEOUS
Qty: 50 EACH | Refills: 3 | Status: SHIPPED | OUTPATIENT
Start: 2023-05-03 | End: 2023-05-23

## 2023-05-03 RX ORDER — ESTRADIOL VALERATE 20 MG/ML
INJECTION INTRAMUSCULAR
Qty: 5 ML | Refills: 1 | Status: SHIPPED | OUTPATIENT
Start: 2023-05-03 | End: 2023-07-20

## 2023-05-03 NOTE — NURSING NOTE
Clinic Administered Medication Documentation      Thyrogen     Name of provider who requested the medication administration: edward   Name of provider on site (faculty or community preceptor) at the time of performing the medication administration: Ash Gonsalez CMA       Date of next administration: N/A  Date of next office visit with provider to renew medication plan (must be seen annually):     Ash Gonsalez CMA

## 2023-05-04 ENCOUNTER — HOSPITAL ENCOUNTER (OUTPATIENT)
Dept: NUCLEAR MEDICINE | Facility: CLINIC | Age: 43
Setting detail: NUCLEAR MEDICINE
Discharge: HOME | End: 2023-05-04
Payer: COMMERCIAL

## 2023-05-04 ENCOUNTER — LAB (OUTPATIENT)
Dept: LAB | Facility: CLINIC | Age: 43
End: 2023-05-04
Payer: COMMERCIAL

## 2023-05-04 DIAGNOSIS — E89.0 POSTOPERATIVE HYPOTHYROIDISM: ICD-10-CM

## 2023-05-04 DIAGNOSIS — C73 PAPILLARY THYROID CARCINOMA (H): ICD-10-CM

## 2023-05-04 LAB
ERYTHROCYTE [DISTWIDTH] IN BLOOD BY AUTOMATED COUNT: 12.3 % (ref 10–15)
HCT VFR BLD AUTO: 40.2 % (ref 35–53)
HGB BLD-MCNC: 13.4 G/DL (ref 11.7–17.7)
MCH RBC QN AUTO: 29.5 PG (ref 26.5–33)
MCHC RBC AUTO-ENTMCNC: 33.3 G/DL (ref 31.5–36.5)
MCV RBC AUTO: 88 FL (ref 78–100)
PLATELET # BLD AUTO: 287 10E3/UL (ref 150–450)
RBC # BLD AUTO: 4.55 10E6/UL (ref 3.8–5.9)
TSH SERPL DL<=0.005 MIU/L-ACNC: 96.92 UIU/ML (ref 0.3–4.2)
WBC # BLD AUTO: 8.5 10E3/UL (ref 4–11)

## 2023-05-04 PROCEDURE — 79005 NUCLEAR RX ORAL ADMIN: CPT | Mod: 26 | Performed by: RADIOLOGY

## 2023-05-04 PROCEDURE — 84432 ASSAY OF THYROGLOBULIN: CPT | Mod: 90 | Performed by: PATHOLOGY

## 2023-05-04 PROCEDURE — 36415 COLL VENOUS BLD VENIPUNCTURE: CPT | Performed by: PATHOLOGY

## 2023-05-04 PROCEDURE — 344N000001 HC RX 344

## 2023-05-04 PROCEDURE — 85027 COMPLETE CBC AUTOMATED: CPT | Performed by: PATHOLOGY

## 2023-05-04 PROCEDURE — 79005 NUCLEAR RX ORAL ADMIN: CPT

## 2023-05-04 PROCEDURE — A9517 I131 IODIDE CAP, RX: HCPCS

## 2023-05-04 PROCEDURE — 86800 THYROGLOBULIN ANTIBODY: CPT | Mod: 90 | Performed by: PATHOLOGY

## 2023-05-04 PROCEDURE — 84443 ASSAY THYROID STIM HORMONE: CPT | Performed by: PATHOLOGY

## 2023-05-04 PROCEDURE — 99000 SPECIMEN HANDLING OFFICE-LAB: CPT | Performed by: PATHOLOGY

## 2023-05-04 RX ADMIN — Medication 102.7 MILLICURIE: at 08:26

## 2023-05-09 ENCOUNTER — HOSPITAL ENCOUNTER (OUTPATIENT)
Dept: NUCLEAR MEDICINE | Facility: CLINIC | Age: 43
Setting detail: NUCLEAR MEDICINE
Discharge: HOME | End: 2023-05-09
Payer: COMMERCIAL

## 2023-05-09 DIAGNOSIS — C73 PAPILLARY THYROID CARCINOMA (H): ICD-10-CM

## 2023-05-09 DIAGNOSIS — E89.0 POSTOPERATIVE HYPOTHYROIDISM: ICD-10-CM

## 2023-05-09 LAB — SCANNED LAB RESULT: NORMAL

## 2023-05-09 PROCEDURE — 78830 RP LOCLZJ TUM SPECT W/CT 1: CPT

## 2023-05-09 PROCEDURE — 78018 THYROID MET IMAGING BODY: CPT | Mod: 26

## 2023-05-09 PROCEDURE — 78832 RP LOCLZJ TUM SPECT W/CT 2: CPT

## 2023-05-22 DIAGNOSIS — F64.0 GENDER DYSPHORIA IN ADULT: ICD-10-CM

## 2023-05-23 RX ORDER — NEEDLES, DISPOSABLE 25GX5/8"
NEEDLE, DISPOSABLE MISCELLANEOUS
Qty: 50 EACH | Refills: 3 | Status: SHIPPED | OUTPATIENT
Start: 2023-05-23 | End: 2023-06-05

## 2023-06-04 DIAGNOSIS — F64.0 GENDER DYSPHORIA IN ADULT: ICD-10-CM

## 2023-06-05 RX ORDER — NEEDLES, DISPOSABLE 25GX5/8"
NEEDLE, DISPOSABLE MISCELLANEOUS
Qty: 25 EACH | Refills: 3 | Status: SHIPPED | OUTPATIENT
Start: 2023-06-05 | End: 2023-10-29

## 2023-06-18 DIAGNOSIS — C73 PAPILLARY THYROID CARCINOMA (H): Primary | ICD-10-CM

## 2023-06-18 DIAGNOSIS — E89.0 POSTOPERATIVE HYPOTHYROIDISM: ICD-10-CM

## 2023-07-17 DIAGNOSIS — F64.0 GENDER DYSPHORIA IN ADULT: ICD-10-CM

## 2023-07-18 RX ORDER — NEEDLES, DISPOSABLE 25GX5/8"
NEEDLE, DISPOSABLE MISCELLANEOUS
Qty: 50 EACH | Refills: 3 | Status: SHIPPED | OUTPATIENT
Start: 2023-07-18 | End: 2023-10-29

## 2023-07-20 DIAGNOSIS — F64.0 GENDER DYSPHORIA IN ADULT: ICD-10-CM

## 2023-07-21 RX ORDER — ESTRADIOL VALERATE 20 MG/ML
INJECTION INTRAMUSCULAR
Qty: 5 ML | Refills: 1 | Status: SHIPPED | OUTPATIENT
Start: 2023-07-21 | End: 2023-08-22

## 2023-07-27 ENCOUNTER — LAB (OUTPATIENT)
Dept: LAB | Facility: CLINIC | Age: 43
End: 2023-07-27
Payer: COMMERCIAL

## 2023-07-27 DIAGNOSIS — C73 PAPILLARY THYROID CARCINOMA (H): ICD-10-CM

## 2023-07-27 DIAGNOSIS — E89.0 POSTOPERATIVE HYPOTHYROIDISM: ICD-10-CM

## 2023-07-27 LAB
T4 FREE SERPL-MCNC: 1.88 NG/DL (ref 0.9–1.7)
TSH SERPL DL<=0.005 MIU/L-ACNC: 1.67 UIU/ML (ref 0.3–4.2)

## 2023-07-27 PROCEDURE — 84443 ASSAY THYROID STIM HORMONE: CPT

## 2023-07-27 PROCEDURE — 84439 ASSAY OF FREE THYROXINE: CPT

## 2023-07-27 PROCEDURE — 84432 ASSAY OF THYROGLOBULIN: CPT

## 2023-07-27 PROCEDURE — 36415 COLL VENOUS BLD VENIPUNCTURE: CPT

## 2023-08-01 LAB — SCANNED LAB RESULT: NORMAL

## 2023-08-17 DIAGNOSIS — F64.0 GENDER DYSPHORIA IN ADULT: Primary | ICD-10-CM

## 2023-08-22 ENCOUNTER — VIRTUAL VISIT (OUTPATIENT)
Dept: ENDOCRINOLOGY | Facility: CLINIC | Age: 43
End: 2023-08-22
Payer: COMMERCIAL

## 2023-08-22 DIAGNOSIS — Z78.9 MALE-TO-FEMALE TRANSGENDER PERSON: ICD-10-CM

## 2023-08-22 DIAGNOSIS — F64.0 GENDER DYSPHORIA IN ADULT: ICD-10-CM

## 2023-08-22 DIAGNOSIS — C73 PAPILLARY THYROID CARCINOMA (H): Primary | ICD-10-CM

## 2023-08-22 DIAGNOSIS — E89.0 POSTOPERATIVE HYPOTHYROIDISM: ICD-10-CM

## 2023-08-22 DIAGNOSIS — R79.89 LOW SERUM PARATHYROID HORMONE (PTH): ICD-10-CM

## 2023-08-22 PROCEDURE — 99215 OFFICE O/P EST HI 40 MIN: CPT | Mod: VID

## 2023-08-22 RX ORDER — LEVOTHYROXINE SODIUM 200 UG/1
200 TABLET ORAL DAILY
Qty: 90 TABLET | Refills: 4 | Status: SHIPPED | OUTPATIENT
Start: 2023-08-22 | End: 2024-07-26

## 2023-08-22 RX ORDER — ESTRADIOL VALERATE 20 MG/ML
INJECTION INTRAMUSCULAR WEEKLY
Qty: 5 ML | Refills: 1 | COMMUNITY
Start: 2023-08-22 | End: 2023-09-25

## 2023-08-22 NOTE — PROGRESS NOTES
Endocrine Consult note    Attending Assessment/Plan :      Papillary thyroid carcinoma 5.6 cm, right, +BRAF V600E mutation , extensive angioinvasion, + 14/52 LN including level 2-4, 7, largest metastatic deposit 1 cm .  Treatment has been total Tx, CND, LND, adjuvant 131I 102 mCi  MACIS 5.04 assuming no distant  pT3a,pN1b, cMx  PRIYA recurrence risk intermediate to high  Next labs prior to next appt   US 2/2024 prior to next appt     Post surgical hypothyroidism - on LT4 175 x 7.5/week (187.5 mcg/day; 1.68 mcg/kg/d); Increase to 200 mcg/day   Labs in 2 months     Addendum  10/25/23 TSH 1.38, free T4 1.66  1/29/24 Tg 0.13, PRIYA < 0.4, TSH 0.33, free T4 1.95 , Ca 8.4,   2/1/24 neck US compared with 12/13/22 and 1/18/23 US thyroid and neck US  Thyroid is absent    Right level 3 # 1 0.9 x 0.3 x 1.2 cm   Right level 3 # 1 absent  abnormal 0.9 x 0.6 x 2.1 cm- 2/13/23 FNAB + Papillary thyroid carcinoma  Left level 4# 1 0.4 x 0.6 x 0.7 cm     Transgender female (pronouns she/her hers) on gender affirming hormone therapy estradiol, spironolactone. This has been managed by primary care at [facility].    She is getting Estradiol IM  4 mg weekly and spironolactone   Labs mid week in Oct or Nov    Prediabetes noted by A1c 6% on 7/5/23.      BMI 31.8    Due to the continued risks of COVID 19 transmission and to improve ease of access this visit was a telephone/video visit. The patient gave verbal consent for the visit today.    I have independently reviewed and interpreted labs, imaging as indicated.     Distant Location (provider location):  Off-site  Mode of Communication:  Video Conference via [employer]  Chart review/prep time 1  1133-   Visit Start time 1602   Visit Stop time  1613   _42_ minutes spent on the date of the encounter doing chart review, history and exam, documentation and further activities as noted above.    Shefali Diaz MD      Chief complaint: HISTORY OF PRESENT ILLNESS    Gordon presents for follow  up of papillary thyroid carcinoma, post surgical hypothyroidism.  She is also a transgender female on gender affirming hormone therapy.    I have seen Gordon once before in 3/2023.  Since then, we treated with 2 dose thyrogen stimulated 131I.   She has tapered off calcitriol. At our last appt she was on LT4 125 mc/gday. :She is now on 175 x 7.5/week (187.5 mcg/day) since 3/30/23. The most recent TSh was not suppressed.     Right neck mass concern was noted  November 2022.   Work up was as follows:   12/22/22 FNAB right thyroid nodule papillary thyroid carcinoma  2/13/23 FNAB lymph node metastatic Papillary thyroid carcinoma    Thyroid cancer treatment has been as follows:   2/21/2023 total thyroidectomy, right level 2-4, 6, left parathyroid reimplantation x 2 (Dr Krishnamurthy): Papillary thyroid carcinoma 5.6 cm, right, +BRAF V600E mutation , extensive angioinvasion, + 14/52 LN including level 2-4, 7, largest metastatic deposit 1 cm .  S  5/4/23 2 dose thyrogen stimulated 131I 102.7 mCi     Endocrine relevant labs are as follows:  9/29/20 HgbA1c 5.5  11/16/2020 testosterone 12, estradiol 220  11/11/22 estradiol 138  11/21/22 TSH 1.27, free T4 1.37, T3 134  2/14/2023 Ca 10, creatinine 0.8, testosterone 14  2/21/2023 PTH 4, iCa 4.4  3/6/2023 PTH 12, iCa 4.5  3/20/23 PTH 13, iCa 4.7  3/29/2023 Tg 1.6, PRIYA < 0.4 , TSH 18.03, free t4 1.17, Ca 9.5, phos 4.4, PTH 19; urine iodine 66 mcg/L, 66 mcg/g creatinine-- increase LT4 to 175 x 7.5/week divided (1.7 mcg/kg/day dose).  Taper off calcitriol   5/4/23 2 dose Thyrogen stimulated Tg 2, PRIYA < 0.4, TSh 96.92,   7/5/23 HgbA1c 6%, cholesterol 211, TG 67, HDL 50, LDl 148  7/27/23 Tg 0.14, PRIYA < 0.4, TSh 1.67, free T4 1.88      Relevant imaging is as follows: (as read by me as it pertains to endocrine relevant organs)  12/13/22 US thyroid  Right thyroid nodule 4.6 x 4 x 5.2 cm iso/hypooechoic , heterogeneous,inferior   Left thyroid nodule # 2 0.5 x 0.7 x 0.8 cm hypoechoic 12/22/22 FNAB +  "Papillary thyroid carcinoma  Left thyroid nodule # 3 0.8 x 01 x 0.9 cm   12/12/22 CT neck with contrast  4.1 x 5.8 x   1/18/2023 US neck:   Right level 3 # 1 abnormal 0.9 x 0.6 x 2.1 cm- 2/13/23 FNAB + Papillary thyroid carcinoma   5/4/23 131I 102.7 mCi  5/9/23 post therapy TBS   activity right neck     On gender affirming therapy with  estrogen since age 37 ;  The record shows various doses of IM estradiol including 5 mg/ml and 20 mg/ml preparations .  The most recent Rx appears to be 20 mg/month iM.  Today she reports she is taking 0.2 ml of 20 mg/ml once/week.  She last took it Sunday.    She would like us to manage the GAT.      REVIEW OF SYSTEMS  Feels normal  Energy OK  Sleep OK   Tolerated the radioactive iodine  Saliva is OK   Cardiac: negative  Respiratory: negative  GI: negative  Tenderness down the surgery line  Irritation right salivary gland region     Past Medical History  Past Medical History:   Diagnosis Date    Gender dysphoria     Hypertension     Mixed hyperlipidemia     Papillary thyroid carcinoma      Past Surgical History:   Procedure Laterality Date    IR FINE NEEDLE ASPIRATION W ULTRASOUND  02/13/2023    THYROIDECTOMY N/A 02/21/2023    Procedure: THYROIDECTOMY, TOTAL with right neck dissection; parathyroid reimplantation x2;  Surgeon: Prema Krishnamurthy MD;  Location: [facility]     Medications  Current Outpatient Medications   Medication Sig Dispense Refill    estradiol valerate (DELESTROGEN) 20 MG/ML injection Inject into the muscle once a week 4 mg IM  (0.2 ml) intramuscular injection weekly 5 mL 1    levothyroxine (SYNTHROID/LEVOTHROID) 200 MCG tablet Take 1 tablet (200 mcg) by mouth daily 90 tablet 4    BD DISP NEEDLE 23G X 1\" MISC USE FOR ESTRADIOL WEEKLY INJECTIONS 50 each 3    BD HYPODERMIC NEEDLE 18G X 1\" MISC USE FOR WEEKLY ESTRADIOL INJECTIONS 25 each 3    fluticasone (FLONASE) 50 MCG/ACT nasal spray SPRAY 1 SPRAY INTO BOTH NOSTRILS DAILY 15.8 mL 1    lisinopril (ZESTRIL) 10 MG tablet " "TAKE 1 TABLET (10 MG) BY MOUTH DAILY. (PLEASE SCHEDULE YOUR ANNUAL CHECK UP FOR NOVEMBER 2022) 90 tablet 1    mineral oil-hydrophilic petrolatum (AQUAPHOR) external ointment Apply topically every 8 hours Apply to incision. 50 g 1    spironolactone (ALDACTONE) 100 MG tablet Take 1 tablet (100 mg) by mouth daily 90 tablet 3    syringe, disposable, 1 ML MISC 1 Syringe once a week Use for drawing up and injecting estrogen 100 each 1       Allergies  No Known Allergies      Family History  Family History   Problem Relation Age of Onset    Asthma Brother     Hyperthyroidism Maternal Grandmother     Prostate Cancer Maternal Grandfather 98    Thyroid Cancer No family hx of      Social History  Social History     Tobacco Use    Smoking status: Some Days    Smokeless tobacco: Never   Vaping Use    Vaping Use: Never used   Substance Use Topics    Alcohol use: Yes     Comment: occ    Drug use: No     Physical Exam  GENERAL: pleasant person in no distress  BP Readings from Last 1 Encounters:   04/12/23 133/84      Pulse Readings from Last 1 Encounters:   04/12/23 95      Resp Readings from Last 1 Encounters:   04/12/23 18      Temp Readings from Last 1 Encounters:   04/12/23 97  F (36.1  C) (Temporal)      SpO2 Readings from Last 1 Encounters:   04/12/23 99%      Wt Readings from Last 1 Encounters:   03/29/23 111.6 kg (246 lb)      Ht Readings from Last 1 Encounters:   03/03/23 1.854 m (6' 1\")     SKIN: Visible skin clear..  EYES: Eyes grossly normal to inspection.    NECK: no visible masses; no grossly visible goiter  RESP: No audible wheeze, cough, or  increased work of breathing.    NEURO:  Awake, alert, responds appropriately to questions.  Mentation and speech fluent.  PSYCH:affect normal, and appearance well-groomed.    DATA REVIEW    Examination:  NM THYROID ABLATION THERAPY I 131  5/4/2023 9:08 AM Comparison:  Ultrasound 1/18/2023.History:  Papillary thyroid carcinoma (H); Postoperativehypothyroidism  Additional " Information: Papillary thyroid carcinoma?5.6 cm, right,+BRAF V600E mutation , extensive angioinvasion, + 14/52 LN including level 2-4, 7, largest metastatic deposit 1 cm.  Procedure: After confirming the identity of the patient by independent sources, and after consultation with the ordering physician  the risks and benefits of I-131 thyroid therapy were discussed at length with the patient, and all questions were answered.102.7 mCi of I-131 were given by mouth to the patient.                                                            Impression: 102.7 mCi I-131 therapeutic treatment for papillary thyroid cancer.  I have personally reviewed the examination and initial interpretationand I agree with the findings.  SAYRA MAXWELL MD     EXAMINATION: NM THYROID POST ABLATION WB NO IODINENuclear medicine whole body  7 day post therapy I-131scan, on 5/9/202312:19 PM.  CLINICAL HISTORY:  Papillary thyroid carcinoma (H); Postoperativehypothyroidism   ADDITIONAL INFORMATION:Papillary thyroid carcinoma?5.6 cm, right,+BRAF V600E mutation , extensive angioinvasion, + 14/52 LN includinglevel 2-4, 7, largest metastatic deposit 1 cm.  TECHNIQUE:  The patient came for 5 day post I-131 therapy scan. Whole body images were done on anterior and posterior projections. Additional SPECT-CTimages of the neck were obtained, but due to patient's rotation duringthe scan, they are not helpful for the interpretation.  FINDINGS: Technically the patient rotated to her head during theSPECT-CT which makes alignment of images for precise localizationdifficult.  Whole body images demonstrates uptake within the right soft tissues greater than left neck, concerning for metastatic disease.   Limited CT demonstrates postsurgical changes of thyroidectomy and right neck dissection. The paranasal sinuses and mastoid air cells areclear. Bilateral lungs are remarkable.  Normally increased tracer uptake is noted in the oral cavity,  liver,stomach and urinary bladder.                                                            IMPRESSION:Whole body images demonstrates uptake within the rightgreater than left neck, concerning for metastatic disease. Due to  patient's rotation during the scan, SPECT-CT images are not helpful  for anatomic localization.  I have personally reviewed the examination and initial interpretation  and I agree with the findings.  ANA MARÍA HAMEED MD     Narrative & Impression   EXAMINATION: US HEAD NECK SOFT TISSUE, 2/1/2024 10:30 AM   COMPARISON: 5/9/2023, 1/18/2023  HISTORY: Papillary thyroid carcinoma (H); Postoperative hypothyroidism  FINDINGS:   Lymph nodes are measured bilaterally with measurements given in  craniocaudal, transverse and AP dimensions as follows:  Right:  Level 2: 0.9 x 0.5 x 1.3 cm ovoid hypoechoic lymph node with a fatty hilum.  Level 3: 0.9 x 0.3 x 1.2 cm ovoid hypoechoic lymph node with suggestion of a fatty hilum.  Level 4: No measurable lymph nodes.  Level 5: No measurable lymph nodes.  Level 6: No measurable lymph nodes.  Level 7: No measurable lymph nodes.  Left:  Level 2: 1.1 x 0.7 1.7 cm ovoid hypoechoic lymph node with suggestion  of a fatty hilum. 1.2 x 0.6 x 1.2 cm ovoid hypoechoic lymph node with  a fatty hilum.  Level 3: No measurable lymph nodes.  Level 4: 0.4 x 0.6 x 0.7 cm ovoid hypoechoic lymph node with suggestion of a fatty hilum.  Level 5: No measurable lymph nodes.  Level 6: No measurable lymph nodes.  Level 7: No measurable lymph nodes.                                                     IMPRESSION:  Soft tissue neck ultrasound with lymph node measurements as described above. No highly suspicious lymph nodes.  ANISH MICHELE, DO

## 2023-08-22 NOTE — LETTER
8/22/2023       RE: Gordon Stack  [address]  [place] [address]     Dear Colleague,    Thank you for referring your patient, Gordon Stack, to the [facility] [place] at [facility]. Please see a copy of my visit note below.    Endocrine Consult note    Attending Assessment/Plan :      Papillary thyroid carcinoma 5.6 cm, right, +BRAF V600E mutation , extensive angioinvasion, + 14/52 LN including level 2-4, 7, largest metastatic deposit 1 cm .  Treatment has been total Tx, CND, LND, adjuvant 131I 102 mCi  MACIS 5.04 assuming no distant  pT3a,pN1b, cMx  PRIYA recurrence risk intermediate to high  Next labs prior to next appt   US 2/2024 prior to next appt     Post surgical hypothyroidism - on LT4 175 x 7.5/week (187.5 mcg/day; 1.68 mcg/kg/d); Increase to 200 mcg/day   Labs in 2 months     Transgender female (pronouns she/her hers) on gender affirming hormone therapy estradiol, spironolactone. This has been managed by primary care at [facility].    She is getting Estradiol IM  4 mg weekly and spironolactone   Labs mid week in Oct or Nov    Prediabetes noted by A1c 6% on 7/5/23.      BMI 31.8    Due to the continued risks of COVID 19 transmission and to improve ease of access this visit was a telephone/video visit. The patient gave verbal consent for the visit today.    I have independently reviewed and interpreted labs, imaging as indicated.     Distant Location (provider location):  Off-site  Mode of Communication:  Video Conference via [employer]  Chart review/prep time 1  1133-   Visit Start time 1602   Visit Stop time  1613   _42_ minutes spent on the date of the encounter doing chart review, history and exam, documentation and further activities as noted above.    Shefali Diaz MD      Chief complaint: HISTORY OF PRESENT ILLNESS    Gordon presents for follow up of papillary thyroid carcinoma, post surgical  hypothyroidism.  She is also a transgender female on gender affirming hormone therapy.    I have seen Gordon once before in 3/2023.  Since then, we treated with 2 dose thyrogen stimulated 131I.   She has tapered off calcitriol. At our last appt she was on LT4 125 mc/gday. :She is now on 175 x 7.5/week (187.5 mcg/day) since 3/30/23. The most recent TSh was not suppressed.     Right neck mass concern was noted  November 2022.   Work up was as follows:   12/22/22 FNAB right thyroid nodule papillary thyroid carcinoma  2/13/23 FNAB lymph node metastatic Papillary thyroid carcinoma    Thyroid cancer treatment has been as follows:   2/21/2023 total thyroidectomy, right level 2-4, 6, left parathyroid reimplantation x 2 (Dr Krishnamurthy): Papillary thyroid carcinoma 5.6 cm, right, +BRAF V600E mutation , extensive angioinvasion, + 14/52 LN including level 2-4, 7, largest metastatic deposit 1 cm .  S  5/4/23 2 dose thyrogen stimulated 131I 102.7 mCi     Endocrine relevant labs are as follows:  9/29/20 HgbA1c 5.5  11/16/2020 testosterone 12, estradiol 220  11/11/22 estradiol 138  11/21/22 TSH 1.27, free T4 1.37, T3 134  2/14/2023 Ca 10, creatinine 0.8, testosterone 14  2/21/2023 PTH 4, iCa 4.4  3/6/2023 PTH 12, iCa 4.5  3/20/23 PTH 13, iCa 4.7  3/29/2023 Tg 1.6, PRIYA < 0.4 , TSH 18.03, free t4 1.17, Ca 9.5, phos 4.4, PTH 19; urine iodine 66 mcg/L, 66 mcg/g creatinine-- increase LT4 to 175 x 7.5/week divided (1.7 mcg/kg/day dose).  Taper off calcitriol   5/4/23 2 dose Thyrogen stimulated Tg 2, PRIYA < 0.4, TSh 96.92,   7/5/23 HgbA1c 6%, cholesterol 211, TG 67, HDL 50, LDl 148  7/27/23 Tg 0.14, PRIYA < 0.4, TSh 1.67, free T4 1.88      Relevant imaging is as follows: (as read by me as it pertains to endocrine relevant organs)  12/13/22 US thyroid  Right thyroid nodule 4.6 x 4 x 5.2 cm iso/hypooechoic , heterogeneous,inferior   Left thyroid nodule # 2 0.5 x 0.7 x 0.8 cm hypoechoic 12/22/22 FNAB + Papillary thyroid carcinoma  Left thyroid nodule  "# 3 0.8 x 01 x 0.9 cm   12/12/22 CT neck with contrast  4.1 x 5.8 x   1/18/2023 US neck:   Right level 3 # 1 abnormal 0.9 x 0.6 x 2.1 cm- 2/13/23 FNAB + Papillary thyroid carcinoma   5/4/23 131I 102.7 mCi  5/9/23 post therapy TBS   activity right neck     On gender affirming therapy with  estrogen since age 37 ;  The record shows various doses of IM estradiol including 5 mg/ml and 20 mg/ml preparations .  The most recent Rx appears to be 20 mg/month iM.  Today she reports she is taking 0.2 ml of 20 mg/ml once/week.  She last took it Sunday.    She would like us to manage the GAT.      REVIEW OF SYSTEMS  Feels normal  Energy OK  Sleep OK   Tolerated the radioactive iodine  Saliva is OK   Cardiac: negative  Respiratory: negative  GI: negative  Tenderness down the surgery line  Irritation right salivary gland region     Past Medical History  Past Medical History:   Diagnosis Date    Gender dysphoria     Hypertension     Mixed hyperlipidemia     Papillary thyroid carcinoma      Past Surgical History:   Procedure Laterality Date    IR FINE NEEDLE ASPIRATION W ULTRASOUND  02/13/2023    THYROIDECTOMY N/A 02/21/2023    Procedure: THYROIDECTOMY, TOTAL with right neck dissection; parathyroid reimplantation x2;  Surgeon: Prema Krishnamurthy MD;  Location[facility]     Medications  Current Outpatient Medications   Medication Sig Dispense Refill    estradiol valerate (DELESTROGEN) 20 MG/ML injection Inject into the muscle once a week 4 mg IM  (0.2 ml) intramuscular injection weekly 5 mL 1    levothyroxine (SYNTHROID/LEVOTHROID) 200 MCG tablet Take 1 tablet (200 mcg) by mouth daily 90 tablet 4    BD DISP NEEDLE 23G X 1\" MISC USE FOR ESTRADIOL WEEKLY INJECTIONS 50 each 3    BD HYPODERMIC NEEDLE 18G X 1\" MISC USE FOR WEEKLY ESTRADIOL INJECTIONS 25 each 3    fluticasone (FLONASE) 50 MCG/ACT nasal spray SPRAY 1 SPRAY INTO BOTH NOSTRILS DAILY 15.8 mL 1    lisinopril (ZESTRIL) 10 MG tablet TAKE 1 TABLET (10 MG) BY MOUTH DAILY. (PLEASE " "SCHEDULE YOUR ANNUAL CHECK UP FOR NOVEMBER 2022) 90 tablet 1    mineral oil-hydrophilic petrolatum (AQUAPHOR) external ointment Apply topically every 8 hours Apply to incision. 50 g 1    spironolactone (ALDACTONE) 100 MG tablet Take 1 tablet (100 mg) by mouth daily 90 tablet 3    syringe, disposable, 1 ML MISC 1 Syringe once a week Use for drawing up and injecting estrogen 100 each 1       Allergies  No Known Allergies      Family History  Family History   Problem Relation Age of Onset    Asthma Brother     Hyperthyroidism Maternal Grandmother     Prostate Cancer Maternal Grandfather 98    Thyroid Cancer No family hx of      Social History  Social History     Tobacco Use    Smoking status: Some Days    Smokeless tobacco: Never   Vaping Use    Vaping Use: Never used   Substance Use Topics    Alcohol use: Yes     Comment: occ    Drug use: No     Physical Exam  GENERAL: pleasant person in no distress  BP Readings from Last 1 Encounters:   04/12/23 133/84      Pulse Readings from Last 1 Encounters:   04/12/23 95      Resp Readings from Last 1 Encounters:   04/12/23 18      Temp Readings from Last 1 Encounters:   04/12/23 97  F (36.1  C) (Temporal)      SpO2 Readings from Last 1 Encounters:   04/12/23 99%      Wt Readings from Last 1 Encounters:   03/29/23 111.6 kg (246 lb)      Ht Readings from Last 1 Encounters:   03/03/23 1.854 m (6' 1\")     SKIN: Visible skin clear..  EYES: Eyes grossly normal to inspection.    NECK: no visible masses; no grossly visible goiter  RESP: No audible wheeze, cough, or  increased work of breathing.    NEURO:  Awake, alert, responds appropriately to questions.  Mentation and speech fluent.  PSYCH:affect normal, and appearance well-groomed.    DATA REVIEW    Examination:  NM THYROID ABLATION THERAPY I 131  5/4/2023 9:08 AM Comparison:  Ultrasound 1/18/2023.History:  Papillary thyroid carcinoma (H); Postoperativehypothyroidism  Additional Information: Papillary thyroid carcinoma?5.6 cm, " right,+BRAF V600E mutation , extensive angioinvasion, + 14/52 LN including level 2-4, 7, largest metastatic deposit 1 cm.  Procedure: After confirming the identity of the patient by independent sources, and after consultation with the ordering physician  the risks and benefits of I-131 thyroid therapy were discussed at length with the patient, and all questions were answered.102.7 mCi of I-131 were given by mouth to the patient.                                                            Impression: 102.7 mCi I-131 therapeutic treatment for papillary thyroid cancer.  I have personally reviewed the examination and initial interpretationand I agree with the findings.  SAYRA MAXWELL MD     EXAMINATION: NM THYROID POST ABLATION WB NO IODINENuclear medicine whole body  7 day post therapy I-131scan, on 5/9/202312:19 PM.  CLINICAL HISTORY:  Papillary thyroid carcinoma (H); Postoperativehypothyroidism   ADDITIONAL INFORMATION:Papillary thyroid carcinoma?5.6 cm, right,+BRAF V600E mutation , extensive angioinvasion, + 14/52 LN includinglevel 2-4, 7, largest metastatic deposit 1 cm.  TECHNIQUE:  The patient came for 5 day post I-131 therapy scan. Whole body images were done on anterior and posterior projections. Additional SPECT-CTimages of the neck were obtained, but due to patient's rotation duringthe scan, they are not helpful for the interpretation.  FINDINGS: Technically the patient rotated to her head during theSPECT-CT which makes alignment of images for precise localizationdifficult.  Whole body images demonstrates uptake within the right soft tissues greater than left neck, concerning for metastatic disease.   Limited CT demonstrates postsurgical changes of thyroidectomy and right neck dissection. The paranasal sinuses and mastoid air cells areclear. Bilateral lungs are remarkable.  Normally increased tracer uptake is noted in the oral cavity, liver,stomach and urinary bladder.                                                             IMPRESSION:Whole body images demonstrates uptake within the rightgreater than left neck, concerning for metastatic disease. Due to  patient's rotation during the scan, SPECT-CT images are not helpful  for anatomic localization.  I have personally reviewed the examination and initial interpretation  and I agree with the findings.  ANA MARÍA HAMEED MD

## 2023-08-22 NOTE — PATIENT INSTRUCTIONS
Increase levothyroxine to 200 mcg/day  Labs in 2 months  (late October or in November)    Labs and US in late 2/2024 about 2-3 weeks prior to next appt    RTC Feb or March with labs and US about 2-3 weeks prior

## 2023-08-22 NOTE — NURSING NOTE
Is the patient currently in the state of [place]? VF cannot remember if question was asked or not.    Visit mode:VIDEO    If the visit is dropped, the patient can be reconnected by: VIDEO VISIT: Text to cell phone:   Telephone Information:   Mobile [phone]       Will anyone else be joining the visit? NO  (If patient encounters technical issues they should call [phone] :[phone])    How would you like to obtain your AVS? MyChart    Are changes needed to the allergy or medication list? Pt stated no changes to allergies and Pt stated no med changes    Reason for visit: RECHECK (Follow up )    Jennifer MARSH

## 2023-09-22 DIAGNOSIS — F64.0 GENDER DYSPHORIA IN ADULT: ICD-10-CM

## 2023-09-25 DIAGNOSIS — I10 ESSENTIAL HYPERTENSION: ICD-10-CM

## 2023-09-25 RX ORDER — ESTRADIOL VALERATE 20 MG/ML
INJECTION INTRAMUSCULAR
Qty: 5 ML | Refills: 1 | Status: SHIPPED | OUTPATIENT
Start: 2023-09-25

## 2023-09-25 RX ORDER — LISINOPRIL 10 MG/1
10 TABLET ORAL DAILY
Qty: 90 TABLET | Refills: 1 | Status: SHIPPED | OUTPATIENT
Start: 2023-09-25

## 2023-09-25 NOTE — TELEPHONE ENCOUNTER
"Request for medication refill: lisinopril (ZESTRIL) 10 MG tablet     Providers if patient needs an appointment and you are willing to give a one month supply please refill for one month and  send a letter/MyChart using \".SMILLIMITEDREFILL\" .smillimited and route chart to \"P [facility] \" (Giving one month refill in non controlled medications is strongly recommended before denial)    If refill has been denied, meaning absolutely no refills without visit, please complete the smart phrase \".smirxrefuse\" and route it to the \"P SMI MED REFILLS\"  pool to inform the patient and the pharmacy.    Ganga Merritt CMA      LV- 02/14/2023    "

## 2023-10-25 ENCOUNTER — LAB (OUTPATIENT)
Dept: LAB | Facility: CLINIC | Age: 43
End: 2023-10-25
Payer: COMMERCIAL

## 2023-10-25 DIAGNOSIS — C73 PAPILLARY THYROID CARCINOMA (H): ICD-10-CM

## 2023-10-25 DIAGNOSIS — E89.0 POSTOPERATIVE HYPOTHYROIDISM: ICD-10-CM

## 2023-10-25 DIAGNOSIS — Z78.9 MALE-TO-FEMALE TRANSGENDER PERSON: ICD-10-CM

## 2023-10-25 LAB
ESTRADIOL SERPL-MCNC: 212 PG/ML
T4 FREE SERPL-MCNC: 1.66 NG/DL (ref 0.9–1.7)
TSH SERPL DL<=0.005 MIU/L-ACNC: 1.38 UIU/ML (ref 0.3–4.2)

## 2023-10-25 PROCEDURE — 82670 ASSAY OF TOTAL ESTRADIOL: CPT

## 2023-10-25 PROCEDURE — 36415 COLL VENOUS BLD VENIPUNCTURE: CPT

## 2023-10-25 PROCEDURE — 84443 ASSAY THYROID STIM HORMONE: CPT

## 2023-10-25 PROCEDURE — 84403 ASSAY OF TOTAL TESTOSTERONE: CPT

## 2023-10-25 PROCEDURE — 84439 ASSAY OF FREE THYROXINE: CPT

## 2023-10-27 LAB — TESTOST SERPL-MCNC: 17 NG/DL (ref 8–950)

## 2023-10-29 ENCOUNTER — HOSPITAL ENCOUNTER (EMERGENCY)
Facility: CLINIC | Age: 43
Discharge: HOME | End: 2023-10-30
Attending: EMERGENCY MEDICINE | Admitting: EMERGENCY MEDICINE
Payer: COMMERCIAL

## 2023-10-29 DIAGNOSIS — D17.1 LIPOMA OF BACK: ICD-10-CM

## 2023-10-29 PROCEDURE — 99282 EMERGENCY DEPT VISIT SF MDM: CPT

## 2023-10-29 ASSESSMENT — ACTIVITIES OF DAILY LIVING (ADL): ADLS_ACUITY_SCORE: 33

## 2023-10-30 VITALS
TEMPERATURE: 97 F | WEIGHT: 245 LBS | HEART RATE: 98 BPM | BODY MASS INDEX: 32.47 KG/M2 | SYSTOLIC BLOOD PRESSURE: 143 MMHG | OXYGEN SATURATION: 98 % | HEIGHT: 73 IN | DIASTOLIC BLOOD PRESSURE: 92 MMHG | RESPIRATION RATE: 18 BRPM

## 2023-10-30 NOTE — ED PROVIDER NOTES
History     Chief Complaint:  Mass     The history is provided by the patient.      Gordon Stack is a 43 year old adult with history of hypertension and hyperlipidemia who presents to the ED for evaluation of mass. She reports that her back was sore yesterday but she assumed that she pulled a muscle. Today, she reports noticing a lump on her lower right back. She never noticed it before.     Independent Historian:   None - Patient Only    Review of External Notes:   Everywhere reviewed in epic updated    Medications:    levothyroxine (SYNTHROID/LEVOTHROID) 200 MCG tablet  lisinopril (ZESTRIL) 10 MG tablet  spironolactone (ALDACTONE) 100 MG tablet    Past Medical History:    Gender dysphoria  Hypertension  Mixed hyperlipidemia  Papillary thyroid carcinoma  Pancreatic cyst   Treated H pylori with ulcer  Low serum parathyroid hormone  Postoperative hypothyroidism    Past Surgical History:    IR Fine Needle Aspiration with Ultrasound   Thyroidectomy     Physical Exam   Patient Vitals for the past 24 hrs:   BP Temp Temp src Pulse Resp SpO2   10/29/23 2159 143/92 97  F (36.1  C) Temporal 98 18 98 %      Physical Exam  Nursing note and vitals reviewed.  Constitutional: Cooperative.  Sitting up comfortably  Pulmonary/Chest: Effort normal  Abdominal: Soft. Normal appearance. There is no tenderness. T  Neurological: Alert.   Skin: Skin is warm and dry. No rash noted. Soft, smooth, mobile mass on right flank. No overlining erythema.  Psychiatric: Normal mood and affect.     Emergency Department Course      Interventions:  Medications - No data to display     Independent Interpretation (X-rays, CTs, rhythm strip):  None    Assessments/Consultations/Discussion of Management or Tests:  ED Course as of 10/30/23 0012   Sun Oct 29, 2023   2126 I obtained history and examined the patient as noted above.    2358 I performed an Ultrasound on the patient.    Mon Oct 30, 2023   0009 I prepared the patient to be discharged home.       Social Determinants of Health affecting care:   None    Disposition:  The patient was discharged to home.     Impression & Plan      Medical Decision Makin-year-old who presents with a painful lump on there right back.  Clinically this is consistent with probable lipoma.  Less likely but also in the differential would be a soft tissue hematoma.  No warmth or erythema or fluctuance to be concern for abscess.  I did a bedside ultrasound which did not show cystic structure or waterfall sign.  Supportive care recommended.  Lipoma discharge instruction provided    Diagnosis:    ICD-10-CM    1. Lipoma of back  D17.1          Scribe Disclosure:  I, Sharon Payne, am serving as a scribe at 11:53 PM on 10/29/2023 to document services personally performed by Zaki Travis MD based on my observations and the provider's statements to me.   10/29/2023   Zaki Travis MD Amdahl, John, MD  10/30/23 0052

## 2023-10-30 NOTE — ED TRIAGE NOTES
Presents to triage with c/o painful mass on R mid back that patient noticed today. Mass is approximately 3 cm in diameter and is firm and tender to touch. Denies all other symptoms.

## 2023-11-03 DIAGNOSIS — F64.0 GENDER DYSPHORIA IN ADULT: Primary | ICD-10-CM

## 2023-11-03 DIAGNOSIS — F64.0 GENDER DYSPHORIA IN ADULT: ICD-10-CM

## 2023-11-03 RX ORDER — SYRINGE, DISPOSABLE, 1 ML
SYRINGE, EMPTY DISPOSABLE MISCELLANEOUS
COMMUNITY
End: 2023-11-03

## 2023-11-07 RX ORDER — SYRINGE, DISPOSABLE, 1 ML
1 SYRINGE, EMPTY DISPOSABLE MISCELLANEOUS WEEKLY
Qty: 25 EACH | Refills: 3 | Status: SHIPPED | OUTPATIENT
Start: 2023-11-07

## 2023-11-07 RX ORDER — NEEDLES, DISPOSABLE 25GX5/8"
NEEDLE, DISPOSABLE MISCELLANEOUS
Qty: 25 EACH | Refills: 3 | Status: SHIPPED | OUTPATIENT
Start: 2023-11-07

## 2023-11-19 NOTE — CONFIDENTIAL NOTE
Talked to Pt to schedule an apt - pt stated that they have moved to a different clinic after moving and would no longer need to have an apt.

## 2024-01-29 ENCOUNTER — LAB (OUTPATIENT)
Dept: LAB | Facility: CLINIC | Age: 44
End: 2024-01-29
Payer: COMMERCIAL

## 2024-01-29 DIAGNOSIS — E89.0 POSTOPERATIVE HYPOTHYROIDISM: ICD-10-CM

## 2024-01-29 DIAGNOSIS — C73 PAPILLARY THYROID CARCINOMA (H): ICD-10-CM

## 2024-01-29 DIAGNOSIS — R79.89 LOW SERUM PARATHYROID HORMONE (PTH): ICD-10-CM

## 2024-01-29 LAB
CALCIUM SERPL-MCNC: 8.4 MG/DL (ref 8.6–10)
T4 FREE SERPL-MCNC: 1.95 NG/DL (ref 0.9–1.7)
TSH SERPL DL<=0.005 MIU/L-ACNC: 0.33 UIU/ML (ref 0.3–4.2)

## 2024-01-29 PROCEDURE — 86800 THYROGLOBULIN ANTIBODY: CPT

## 2024-01-29 PROCEDURE — 82310 ASSAY OF CALCIUM: CPT

## 2024-01-29 PROCEDURE — 84439 ASSAY OF FREE THYROXINE: CPT

## 2024-01-29 PROCEDURE — 84443 ASSAY THYROID STIM HORMONE: CPT

## 2024-01-29 PROCEDURE — 36415 COLL VENOUS BLD VENIPUNCTURE: CPT

## 2024-02-01 ENCOUNTER — ANCILLARY PROCEDURE (OUTPATIENT)
Dept: ULTRASOUND IMAGING | Facility: CLINIC | Age: 44
End: 2024-02-01
Payer: COMMERCIAL

## 2024-02-01 DIAGNOSIS — C73 PAPILLARY THYROID CARCINOMA (H): ICD-10-CM

## 2024-02-01 DIAGNOSIS — E89.0 POSTOPERATIVE HYPOTHYROIDISM: ICD-10-CM

## 2024-02-01 LAB — SCANNED LAB RESULT: NORMAL

## 2024-02-01 PROCEDURE — 76536 US EXAM OF HEAD AND NECK: CPT | Performed by: RADIOLOGY

## 2024-03-17 NOTE — PROGRESS NOTES
Endocrine video visit note    Attending Assessment/Plan :      Papillary thyroid carcinoma 5.6 cm, right, +BRAF V600E mutation , extensive angioinvasion, + 14/52 LN including level 2-4, 7, largest metastatic deposit 1 cm .  Treatment has been total Tx, CND, LND, adjuvant 131I 102 mCi  MACIS 5.04 assuming no distant  pT3a,pN1b, cMx  PRIYA recurrence risk intermediate to libra     Post surgical hypothyroidism - on LT4  200 mcg/day     Addendum  7/19/24 Tg 0.12 (concurrent 0.14) , PRIYA < 0.4, TSh 1.33, free T4 2.18, Ca 9.1, phos 4, HgbA1c 5.9%    Transgender female (pronouns she/her hers) on gender affirming hormone therapy estradiol, spironolactone. This has been managed by primary care at [facility].    She is getting Estradiol IM  4 mg weekly (despite the Rx on the MAR)  and spironolactone     Prediabetes noted by A1c 6% on 7/5/23.    follow up on this with next blood draw.  I have counseled her on diet/exercise to prevent/delay on set of DM    Hypocalcemia noted on 1/2024 labs -  next labs to include repeat Ca .  Discussed dietary calcium intake - see AVS     BMI 30.2 - as above .    Due to the continued risks of COVID 19 transmission and to improve ease of access this visit was a video visit. The patient gave verbal consent for the visit today.    I have independently reviewed and interpreted labs, imaging as indicated.     Distant Location (provider location):  Off-site  Mode of Communication:  Video Conference via [employer]  Chart review/prep time 1  1409; 1459; precharting done on 3/15  Visit Start time  1641   Visit Stop time  1652   24__ minutes spent on the date of the encounter doing chart review, history and exam, documentation and further activities as noted above.    Shefali Diaz MD      Chief complaint: HISTORY OF PRESENT ILLNESS    Gordon presents for follow up of papillary thyroid carcinoma, post surgical hypothyroidism.  She is also a transgender female on gender affirming hormone therapy.     I have seen Gordon 8/2023.  At our last appt she was on LT4  175 x 7.5/week (187.5 mcg/day) and we increased to 200 mcg/day.     Right neck mass concern was noted  November 2022.   Work up was as follows:   12/22/22 FNAB right thyroid nodule papillary thyroid carcinoma  2/13/23 FNAB lymph node metastatic Papillary thyroid carcinoma    Thyroid cancer treatment has been as follows:   2/21/2023 total thyroidectomy, right level 2-4, 6, left parathyroid reimplantation x 2 (Dr Krishnamurthy): Papillary thyroid carcinoma 5.6 cm, right, +BRAF V600E mutation , extensive angioinvasion, + 14/52 LN including level 2-4, 7, largest metastatic deposit 1 cm .  S  5/4/23 2 dose thyrogen stimulated 131I 102.7 mCi     Endocrine relevant labs are as follows:  9/29/20 HgbA1c 5.5  11/16/2020 testosterone 12, estradiol 220  11/11/22 estradiol 138  2/14/2023  testosterone 14  2/21/2023 PTH 4, iCa 4.4  3/29/2023 Tg 1.6, PRIYA < 0.4 , TSH 18.03, free t4 1.17, Ca 9.5, phos 4.4, PTH 19; urine iodine 66 mcg/L, 66 mcg/g creatinine-- increase LT4 to 175 x 7.5/week divided (1.7 mcg/kg/day dose).  Taper off calcitriol   5/4/23 2 dose Thyrogen stimulated Tg 2, PRIYA < 0.4, TSh 96.92,   7/5/23 HgbA1c 6%, cholesterol 211, TG 67, HDL 50, LDl 148  7/27/23 Tg 0.14, PRIYA < 0.4, TSh 1.67, free T4 1.88  10/25/23 TSH 1.38, free T4 1.66, estradiol 212, testosterone 17  1/29/24 Tg 0.13, PRIYA < 0.4, TSH 0.33, free T4 1.95 , Ca 8.4,     Relevant imaging is as follows: (as read by me as it pertains to endocrine relevant organs)  12/12/22 CT neck with contrast  4.1 x 5.8 x    5/4/23 131I 102.7 mCi  5/9/23 post therapy TBS   activity right neck   2/1/24 neck US compared with 12/13/22 and 1/18/23 US thyroid and neck US  Thyroid is absent    Right level 3 # 1 0.9 x 0.3 x 1.2 cm   Right level 3 # 1 absent  abnormal 0.9 x 0.6 x 2.1 cm- 2/13/23 FNAB + Papillary thyroid carcinoma  Left level 4# 1 0.4 x 0.6 x 0.7 cm     On gender affirming therapy with  estrogen since age 37 ;  she is  "taking 0.2 ml of 20 mg/ml once/week (4 mg) and spironolactone .  The labs in October showed good T suppression and normal estradiol.     REVIEW OF SYSTEMS  Weight has reduced in the last year - 229 this AM  Feels normal   Energy good  Sleep perfect  Cardiac: negative  Respiratory: negative  GI: negative     Past Medical History  Past Medical History:   Diagnosis Date    Gender dysphoria     Hypertension     Mixed hyperlipidemia     Obesity     Pancreatic cyst     Papillary thyroid carcinoma      Past Surgical History:   Procedure Laterality Date    IR FINE NEEDLE ASPIRATION W ULTRASOUND  02/13/2023    THYROIDECTOMY N/A 02/21/2023    Procedure: THYROIDECTOMY, TOTAL with right neck dissection; parathyroid reimplantation x2;  Surgeon: Prema Krishnamurthy MD;  Location: [facility]     Medications  Current Outpatient Medications   Medication Sig Dispense Refill    BD DISP NEEDLE 23G X 1\" MISC USE FOR ESTRADIOL WEEKLY INJECTIONS 25 each 3    estradiol valerate (DELESTROGEN) 20 MG/ML injection INJECT 1 ML (20 MG) INTO THE MUSCLE EVERY 28 DAYS. (DISCARD VIAL 28 DAYS AFTER FIRST USE) 5 mL 1    fluticasone (FLONASE) 50 MCG/ACT nasal spray SPRAY 1 SPRAY INTO BOTH NOSTRILS DAILY 15.8 mL 1    levothyroxine (SYNTHROID/LEVOTHROID) 200 MCG tablet Take 1 tablet (200 mcg) by mouth daily 90 tablet 4    lisinopril (ZESTRIL) 10 MG tablet TAKE ONE TABLET BY MOUTH ONCE DAILY 90 tablet 1    mineral oil-hydrophilic petrolatum (AQUAPHOR) external ointment Apply topically every 8 hours Apply to incision. 50 g 1    Needle, Disp, 23G X 1\" MISC       spironolactone (ALDACTONE) 100 MG tablet Take 1 tablet (100 mg) by mouth daily 90 tablet 3    syringe, disposable, (B-D SYRINGE LUER-EUSEBIO) 1 ML MISC 1 mL once a week 25 each 3     Estrogen is 0.2 ml once/week     Allergies  No Known Allergies    Family History  Family History   Problem Relation Age of Onset    Asthma Brother     Hyperthyroidism Maternal Grandmother     Prostate Cancer Maternal Grandfather " "98    Thyroid Cancer No family hx of      Social History  Social History     Tobacco Use    Smoking status: Some Days    Smokeless tobacco: Never   Vaping Use    Vaping Use: Never used   Substance Use Topics    Alcohol use: Yes     Comment: occ    Drug use: No     Physical Exam  GENERAL: pleasant person in no distress  BP Readings from Last 1 Encounters:   10/29/23 (!) 143/92      Pulse Readings from Last 1 Encounters:   10/29/23 98      Resp Readings from Last 1 Encounters:   10/29/23 18      Temp Readings from Last 1 Encounters:   10/29/23 97  F (36.1  C) (Temporal)      SpO2 Readings from Last 1 Encounters:   10/29/23 98%      Wt Readings from Last 1 Encounters:   10/30/23 111.1 kg (245 lb)      Ht Readings from Last 1 Encounters:   10/30/23 1.854 m (6' 1\")     SKIN: Visible skin clear..  EYES: Eyes grossly normal to inspection.    NECK: no visible masses; no grossly visible goiter  RESP: No audible wheeze, cough, or  increased work of breathing.    NEURO:  Awake, alert, responds appropriately to questions.  Mentation and speech fluent.  PSYCH:affect normal, and appearance well-groomed.    DATA REVIEW     Latest Ref Rng 7/27/2023  7:17 AM 10/25/2023  7:12 AM 1/29/2024  7:11 AM   ENDO THYROID LABS-[facility]       TSH 0.40 - 4.00 mU/L      TSH 0.30 - 4.20 uIU/mL 1.67  1.38  0.33    Triiodothyronine (T3) 85 - 202 ng/dL      FREE T4 0.90 - 1.70 ng/dL 1.88 (H)  1.66  1.95 (H)        Latest Ref Rng 3/20/2023  7:28 AM 3/29/2023  3:14 PM 1/29/2024  7:11 AM   ENDO CALCIUM LABS-[facility]       CALCIUM IONIZED WB 4.4 - 5.2 mg/dL 4.7      Calcium 8.6 - 10.0 mg/dL  9.5  8.4 (L)    Urea Nitrogen 7 - 30 mg/dL      Urea Nitrogen 6.0 - 20.0 mg/dL      Creatinine 0.51 - 1.17 mg/dL      Lipase 73 - 393 U/L      Parathyroid Hormone Intact 15 - 65 pg/mL 13 (L)  19         Latest Ref Rng 10/25/2023  7:12 AM   ENDO PITUITARY LABS-[facility]     Estradiol pg/mL 212    Glucose 70 - 99 mg/dL    Glucose 70 - 99 mg/dL    GLUCOSE BY METER POCT 70 - 99 mg/dL  "   Potassium 3.4 - 5.3 mmol/L    Sodium 136 - 145 mmol/L    Testosterone Total 8 - 950 ng/dL 17          Narrative & Impression   EXAMINATION: US HEAD NECK SOFT TISSUE, 2/1/2024 10:30 AM   COMPARISON: 5/9/2023, 1/18/2023  HISTORY: Papillary thyroid carcinoma (H); Postoperative hypothyroidism  FINDINGS:   Lymph nodes are measured bilaterally with measurements given in  craniocaudal, transverse and AP dimensions as follows:  Right:  Level 2: 0.9 x 0.5 x 1.3 cm ovoid hypoechoic lymph node with a fatty hilum.  Level 3: 0.9 x 0.3 x 1.2 cm ovoid hypoechoic lymph node with suggestion of a fatty hilum.  Level 4: No measurable lymph nodes.  Level 5: No measurable lymph nodes.  Level 6: No measurable lymph nodes.  Level 7: No measurable lymph nodes.  Left:  Level 2: 1.1 x 0.7 1.7 cm ovoid hypoechoic lymph node with suggestion  of a fatty hilum. 1.2 x 0.6 x 1.2 cm ovoid hypoechoic lymph node with  a fatty hilum.  Level 3: No measurable lymph nodes.  Level 4: 0.4 x 0.6 x 0.7 cm ovoid hypoechoic lymph node with suggestion of a fatty hilum.  Level 5: No measurable lymph nodes.  Level 6: No measurable lymph nodes.  Level 7: No measurable lymph nodes.                                                     IMPRESSION:  Soft tissue neck ultrasound with lymph node measurements as described above. No highly suspicious lymph nodes.  ANISH MICHELE, DO

## 2024-03-18 ENCOUNTER — VIRTUAL VISIT (OUTPATIENT)
Dept: ENDOCRINOLOGY | Facility: CLINIC | Age: 44
End: 2024-03-18
Payer: COMMERCIAL

## 2024-03-18 VITALS — WEIGHT: 231 LBS | BODY MASS INDEX: 30.48 KG/M2

## 2024-03-18 DIAGNOSIS — E89.0 POSTOPERATIVE HYPOTHYROIDISM: ICD-10-CM

## 2024-03-18 DIAGNOSIS — E83.51 HYPOCALCEMIA: ICD-10-CM

## 2024-03-18 DIAGNOSIS — C73 PAPILLARY THYROID CARCINOMA (H): Primary | ICD-10-CM

## 2024-03-18 DIAGNOSIS — E66.9 OBESITY (BMI 30-39.9): ICD-10-CM

## 2024-03-18 DIAGNOSIS — R73.03 PREDIABETES: ICD-10-CM

## 2024-03-18 PROCEDURE — 99214 OFFICE O/P EST MOD 30 MIN: CPT | Mod: 95

## 2024-03-18 NOTE — PATIENT INSTRUCTIONS
Labs in July or August 2024 -- I will send results (this will be for thyroid cancer and to follow up on prediabetes    Neck Ultrasound prior to next visit in about one year  Labs about 2-3 weeks prior to next visit in one year    CALCIUM Recommendation 1200 mg/day in divided dose of no more than 500 mg/dose. This includes what is in your food and also what is in any supplements you are taking.    Dietary sources of calcium: These also contain vitamin D  Milk / buttermilk              8 oz            300 mg  Dry milk powder       5 Tbsp             300 mg  Yogurt                          1 cup           400 mg  Ice cream   1/2 cup          100 mg  Hard cheese                     1.5 oz          300 mg  Cottage cheese                1/2 cup        65 mg  Spinach, cooked  1 cup  240 mg  Tofu, soft regular           4 oz          120 to 390 mg  Sardines                       3 oz               370 mg  Mackerel canned         3 oz                250 mg  Canned salmon with bones 3 oz        170-210 mg  Calcium fortified cereals are available  SILK soy and almond milk products are calcium fortified  Orange juice  Fortified with Calcium       8 oz            300 mg  Low fat dairy sources are recommended

## 2024-03-18 NOTE — LETTER
3/18/2024       RE: Gordon Stack  [address]  [facility] [place] [facility] [place] [address]     Dear Colleague,    Thank you for referring your patient, Gordon Stack, to the [facility] [place] at [facility]. Please see a copy of my visit note below.    Endocrine video visit note    Attending Assessment/Plan :      Papillary thyroid carcinoma 5.6 cm, right, +BRAF V600E mutation , extensive angioinvasion, + 14/52 LN including level 2-4, 7, largest metastatic deposit 1 cm .  Treatment has been total Tx, CND, LND, adjuvant 131I 102 mCi  MACIS 5.04 assuming no distant  pT3a,pN1b, cMx  PRIYA recurrence risk intermediate to libra     Post surgical hypothyroidism - on LT4  200 mcg/day     Transgender female (pronouns she/her hers) on gender affirming hormone therapy estradiol, spironolactone. This has been managed by primary care at [facility].    She is getting Estradiol IM  4 mg weekly (despite the Rx on the MAR)  and spironolactone     Prediabetes noted by A1c 6% on 7/5/23.    follow up on this with next blood draw.  I have counseled her on diet/exercise to prevent/delay on set of DM    Hypocalcemia noted on 1/2024 labs -  next labs to include repeat Ca .  Discussed dietary calcium intake - see AVS     BMI 30.2 - as above .    Due to the continued risks of COVID 19 transmission and to improve ease of access this visit was a video visit. The patient gave verbal consent for the visit today.    I have independently reviewed and interpreted labs, imaging as indicated.     Distant Location (provider location):  Off-site  Mode of Communication:  Video Conference via [employer]  Chart review/prep time 1  1409; 1459; precharting done on 3/15  Visit Start time  1641   Visit Stop time  1652   24__ minutes spent on the date of the encounter doing chart review, history and exam, documentation and further activities as noted above.    Shefali Diaz,  MD      Chief complaint: HISTORY OF PRESENT ILLNESS    Gordon presents for follow up of papillary thyroid carcinoma, post surgical hypothyroidism.  She is also a transgender female on gender affirming hormone therapy.    I have seen Gordon 8/2023.  At our last appt she was on LT4  175 x 7.5/week (187.5 mcg/day) and we increased to 200 mcg/day.     Right neck mass concern was noted  November 2022.   Work up was as follows:   12/22/22 FNAB right thyroid nodule papillary thyroid carcinoma  2/13/23 FNAB lymph node metastatic Papillary thyroid carcinoma    Thyroid cancer treatment has been as follows:   2/21/2023 total thyroidectomy, right level 2-4, 6, left parathyroid reimplantation x 2 (Dr Krishnamurthy): Papillary thyroid carcinoma 5.6 cm, right, +BRAF V600E mutation , extensive angioinvasion, + 14/52 LN including level 2-4, 7, largest metastatic deposit 1 cm .  S  5/4/23 2 dose thyrogen stimulated 131I 102.7 mCi     Endocrine relevant labs are as follows:  9/29/20 HgbA1c 5.5  11/16/2020 testosterone 12, estradiol 220  11/11/22 estradiol 138  2/14/2023  testosterone 14  2/21/2023 PTH 4, iCa 4.4  3/29/2023 Tg 1.6, PRIYA < 0.4 , TSH 18.03, free t4 1.17, Ca 9.5, phos 4.4, PTH 19; urine iodine 66 mcg/L, 66 mcg/g creatinine-- increase LT4 to 175 x 7.5/week divided (1.7 mcg/kg/day dose).  Taper off calcitriol   5/4/23 2 dose Thyrogen stimulated Tg 2, PRIYA < 0.4, TSh 96.92,   7/5/23 HgbA1c 6%, cholesterol 211, TG 67, HDL 50, LDl 148  7/27/23 Tg 0.14, PRIYA < 0.4, TSh 1.67, free T4 1.88  10/25/23 TSH 1.38, free T4 1.66, estradiol 212, testosterone 17  1/29/24 Tg 0.13, PRIYA < 0.4, TSH 0.33, free T4 1.95 , Ca 8.4,     Relevant imaging is as follows: (as read by me as it pertains to endocrine relevant organs)  12/12/22 CT neck with contrast  4.1 x 5.8 x    5/4/23 131I 102.7 mCi  5/9/23 post therapy TBS   activity right neck   2/1/24 neck US compared with 12/13/22 and 1/18/23 US thyroid and neck US  Thyroid is absent    Right level 3 # 1 0.9 x  "0.3 x 1.2 cm   Right level 3 # 1 absent  abnormal 0.9 x 0.6 x 2.1 cm- 2/13/23 FNAB + Papillary thyroid carcinoma  Left level 4# 1 0.4 x 0.6 x 0.7 cm     On gender affirming therapy with  estrogen since age 37 ;  she is taking 0.2 ml of 20 mg/ml once/week (4 mg) and spironolactone .  The labs in October showed good T suppression and normal estradiol.     REVIEW OF SYSTEMS  Weight has reduced in the last year - 229 this AM  Feels normal   Energy good  Sleep perfect  Cardiac: negative  Respiratory: negative  GI: negative     Past Medical History  Past Medical History:   Diagnosis Date    Gender dysphoria     Hypertension     Mixed hyperlipidemia     Obesity     Pancreatic cyst     Papillary thyroid carcinoma      Past Surgical History:   Procedure Laterality Date    IR FINE NEEDLE ASPIRATION W ULTRASOUND  02/13/2023    THYROIDECTOMY N/A 02/21/2023    Procedure: THYROIDECTOMY, TOTAL with right neck dissection; parathyroid reimplantation x2;  Surgeon: Prema Krishnamurthy MD;  Location: [facility]     Medications  Current Outpatient Medications   Medication Sig Dispense Refill    BD DISP NEEDLE 23G X 1\" MISC USE FOR ESTRADIOL WEEKLY INJECTIONS 25 each 3    estradiol valerate (DELESTROGEN) 20 MG/ML injection INJECT 1 ML (20 MG) INTO THE MUSCLE EVERY 28 DAYS. (DISCARD VIAL 28 DAYS AFTER FIRST USE) 5 mL 1    fluticasone (FLONASE) 50 MCG/ACT nasal spray SPRAY 1 SPRAY INTO BOTH NOSTRILS DAILY 15.8 mL 1    levothyroxine (SYNTHROID/LEVOTHROID) 200 MCG tablet Take 1 tablet (200 mcg) by mouth daily 90 tablet 4    lisinopril (ZESTRIL) 10 MG tablet TAKE ONE TABLET BY MOUTH ONCE DAILY 90 tablet 1    mineral oil-hydrophilic petrolatum (AQUAPHOR) external ointment Apply topically every 8 hours Apply to incision. 50 g 1    Needle, Disp, 23G X 1\" MISC       spironolactone (ALDACTONE) 100 MG tablet Take 1 tablet (100 mg) by mouth daily 90 tablet 3    syringe, disposable, (B-D SYRINGE LUER-EUSEBIO) 1 ML MISC 1 mL once a week 25 each 3     Estrogen " "is 0.2 ml once/week     Allergies  No Known Allergies    Family History  Family History   Problem Relation Age of Onset    Asthma Brother     Hyperthyroidism Maternal Grandmother     Prostate Cancer Maternal Grandfather 98    Thyroid Cancer No family hx of      Social History  Social History     Tobacco Use    Smoking status: Some Days    Smokeless tobacco: Never   Vaping Use    Vaping Use: Never used   Substance Use Topics    Alcohol use: Yes     Comment: occ    Drug use: No     Physical Exam  GENERAL: pleasant person in no distress  BP Readings from Last 1 Encounters:   10/29/23 (!) 143/92      Pulse Readings from Last 1 Encounters:   10/29/23 98      Resp Readings from Last 1 Encounters:   10/29/23 18      Temp Readings from Last 1 Encounters:   10/29/23 97  F (36.1  C) (Temporal)      SpO2 Readings from Last 1 Encounters:   10/29/23 98%      Wt Readings from Last 1 Encounters:   10/30/23 111.1 kg (245 lb)      Ht Readings from Last 1 Encounters:   10/30/23 1.854 m (6' 1\")     SKIN: Visible skin clear..  EYES: Eyes grossly normal to inspection.    NECK: no visible masses; no grossly visible goiter  RESP: No audible wheeze, cough, or  increased work of breathing.    NEURO:  Awake, alert, responds appropriately to questions.  Mentation and speech fluent.  PSYCH:affect normal, and appearance well-groomed.    DATA REVIEW     Latest Ref Rng 7/27/2023  7:17 AM 10/25/2023  7:12 AM 1/29/2024  7:11 AM   ENDO THYROID LABS-[facility]       TSH 0.40 - 4.00 mU/L      TSH 0.30 - 4.20 uIU/mL 1.67  1.38  0.33    Triiodothyronine (T3) 85 - 202 ng/dL      FREE T4 0.90 - 1.70 ng/dL 1.88 (H)  1.66  1.95 (H)        Latest Ref Rng 3/20/2023  7:28 AM 3/29/2023  3:14 PM 1/29/2024  7:11 AM   ENDO CALCIUM LABS-[facility]       CALCIUM IONIZED WB 4.4 - 5.2 mg/dL 4.7      Calcium 8.6 - 10.0 mg/dL  9.5  8.4 (L)    Urea Nitrogen 7 - 30 mg/dL      Urea Nitrogen 6.0 - 20.0 mg/dL      Creatinine 0.51 - 1.17 mg/dL      Lipase 73 - 393 U/L      Parathyroid " Hormone Intact 15 - 65 pg/mL 13 (L)  19         Latest Ref Rng 10/25/2023  7:12 AM   ENDO PITUITARY LABS-[facility]     Estradiol pg/mL 212    Glucose 70 - 99 mg/dL    Glucose 70 - 99 mg/dL    GLUCOSE BY METER POCT 70 - 99 mg/dL    Potassium 3.4 - 5.3 mmol/L    Sodium 136 - 145 mmol/L    Testosterone Total 8 - 950 ng/dL 17          Narrative & Impression   EXAMINATION: US HEAD NECK SOFT TISSUE, 2/1/2024 10:30 AM   COMPARISON: 5/9/2023, 1/18/2023  HISTORY: Papillary thyroid carcinoma (H); Postoperative hypothyroidism  FINDINGS:   Lymph nodes are measured bilaterally with measurements given in  craniocaudal, transverse and AP dimensions as follows:  Right:  Level 2: 0.9 x 0.5 x 1.3 cm ovoid hypoechoic lymph node with a fatty hilum.  Level 3: 0.9 x 0.3 x 1.2 cm ovoid hypoechoic lymph node with suggestion of a fatty hilum.  Level 4: No measurable lymph nodes.  Level 5: No measurable lymph nodes.  Level 6: No measurable lymph nodes.  Level 7: No measurable lymph nodes.  Left:  Level 2: 1.1 x 0.7 1.7 cm ovoid hypoechoic lymph node with suggestion  of a fatty hilum. 1.2 x 0.6 x 1.2 cm ovoid hypoechoic lymph node with  a fatty hilum.  Level 3: No measurable lymph nodes.  Level 4: 0.4 x 0.6 x 0.7 cm ovoid hypoechoic lymph node with suggestion of a fatty hilum.  Level 5: No measurable lymph nodes.  Level 6: No measurable lymph nodes.  Level 7: No measurable lymph nodes.                                                     IMPRESSION:  Soft tissue neck ultrasound with lymph node measurements as described above. No highly suspicious lymph nodes.  DO Shefali WINSLOW MD

## 2024-03-18 NOTE — NURSING NOTE
Is the patient currently in the state of [place]? YES    Visit mode:VIDEO    If the visit is dropped, the patient can be reconnected by: VIDEO VISIT: Text to cell phone:   Telephone Information:   Mobile [phone]       Will anyone else be joining the visit? NO  (If patient encounters technical issues they should call [phone]    How would you like to obtain your AVS? MyChart    Are changes needed to the allergy or medication list? Pt stated no changes to allergies and Pt stated no med changes    Reason for visit: Follow Up    Dina MARSH

## 2024-03-22 ENCOUNTER — TELEPHONE (OUTPATIENT)
Dept: ENDOCRINOLOGY | Facility: CLINIC | Age: 44
End: 2024-03-22
Payer: COMMERCIAL

## 2024-03-22 NOTE — TELEPHONE ENCOUNTER
Spoke with patient and scheduled July 2024 lab appointment and 1 year follow-up appointment with Dr. Neal shah per checkout notes. Patient ended call while on hold with imaging. Called patient back and lvm and sent myc x1 to schedule:    Appointment type: labs and ultrasound  Provider: per Dr. Sullivan  Return date: 2-3 weeks prior to next appointment in 1 year  Specialty phone number: [phone]  Check Out Comments: Labs in July or August 2024 Labs and neck US about 2 weeks prior to next visit in one year OK to use return MAK

## 2024-05-02 ENCOUNTER — OFFICE VISIT (OUTPATIENT)
Dept: FAMILY MEDICINE | Facility: CLINIC | Age: 44
End: 2024-05-02
Payer: COMMERCIAL

## 2024-05-02 DIAGNOSIS — D22.9 MULTIPLE BENIGN NEVI: Primary | ICD-10-CM

## 2024-05-02 DIAGNOSIS — L82.1 SK (SEBORRHEIC KERATOSIS): ICD-10-CM

## 2024-05-02 DIAGNOSIS — D18.01 CHERRY ANGIOMA: ICD-10-CM

## 2024-05-02 DIAGNOSIS — D49.2 NEOPLASM OF SKIN: ICD-10-CM

## 2024-05-02 DIAGNOSIS — L81.4 LENTIGINES: ICD-10-CM

## 2024-05-02 PROCEDURE — 88305 TISSUE EXAM BY PATHOLOGIST: CPT | Performed by: DERMATOLOGY

## 2024-05-02 PROCEDURE — 99203 OFFICE O/P NEW LOW 30 MIN: CPT | Mod: 25 | Performed by: PHYSICIAN ASSISTANT

## 2024-05-02 PROCEDURE — 11103 TANGNTL BX SKIN EA SEP/ADDL: CPT | Performed by: PHYSICIAN ASSISTANT

## 2024-05-02 PROCEDURE — 11102 TANGNTL BX SKIN SINGLE LES: CPT | Performed by: PHYSICIAN ASSISTANT

## 2024-05-02 ASSESSMENT — PAIN SCALES - GENERAL: PAINLEVEL: NO PAIN (0)

## 2024-05-02 NOTE — LETTER
5/2/2024         RE: Gordon Stack  [address]  [facility] [address]        Dear Colleague,    Thank you for referring your patient, Gordon Stack, to the [facility] [place]. Please see a copy of my visit note below.    [facility] Note  Encounter Date: May 2, 2024  Office Visit      Dermatology Problem List:  FBSE 5/2/24    #NUB x 2, S/p Biopsy performed on 5/2/24: Pending results.     Family hx: Mother and Grandmother have a hx of MM.   Social: Grew up in [place]   ____________________________________________    Assessment & Plan:  # Neoplasm of unspecified behavior of the skin (D49.2) on the L upper back. The differential diagnosis includes DN vs MM vs other.   - Shave biopsy performed today, see procedure note below.   - Photographed today     # Neoplasm of unspecified behavior of the skin (D49.2) on the L upper back. The differential diagnosis includes DN vs MM vs other   - Shave biopsy performed today, see procedure note below.   - Photographed today     # Family hx of MM, Mother & Grandmother  - Sunscreen: Apply 20 minutes prior to going outdoors and reapply every two hours, when wet or sweating. We recommend using an SPF 30 or higher, and to use one that is water resistant.     - Advised to monitor for changing, non-healing, bleeding, painful, changing, or otherwise symptomatic lesions  - Continue annual skin exam    # Benign findings: multiple benign nevi, lentigines, cherry angiomas, SKs  - edu on benign etiology  - Signs and Symptoms of non-melanoma skin cancer and ABCDEs of melanoma reviewed with patient. Patient encouraged to perform monthly self skin exams and educated on how to perform them. UV precautions reviewed with patient. Patient was asked about new or changing moles/lesions on body.   - Sunscreen: Apply 20 minutes prior to going outdoors and reapply every two hours, when wet or sweating. We recommend using an SPF 30 or  higher, and to use one that is water resistant.     - RTC for changes    Procedures Performed:   - Shave biopsy x2 procedure note, location(s): see above. After discussion of benefits and risks including but not limited to bleeding, infection, scar, incomplete removal, recurrence, and non-diagnostic biopsy, written consent and photographs were obtained. The area was cleaned with isopropyl alcohol. 0.5mL of 1% lidocaine with epinephrine was injected to obtain adequate anesthesia of lesion(s). Shave biopsy at site(s) performed. Hemostasis was achieved with aluminium chloride. Petrolatum ointment and a sterile dressing were applied. The patient tolerated the procedure and no complications were noted. The patient was provided with verbal and written post care instructions.      Follow-up: 1 year(s) in-person, or earlier for new or changing lesions    Staff and scribe:   Scribe Disclosure:   I, GEORGETTE HARE, am serving as a scribe; to document services personally performed by Lynette Bansal PA-C -based on data collection and the provider's statements to me.     Provider Disclosure:  I agree with above History, Review of Systems, Physical exam and Plan.  I have reviewed the content of the documentation and have edited it as needed. I have personally performed the services documented here and the documentation accurately represents those services and the decisions I have made.      Electronically signed by:    All risks, benefits and alternatives were discussed with patient.  Patient is in agreement and understands the assessment and plan.  All questions were answered.    Lynette Bansal PA-C, MPAS  [facility] [place]: Phone: [phone], Fax: [phone]  [facility]: Phone: [phone],  Fax: [phone]  [facility]: Phone: [phone], Fax: [phone]  ____________________________________________    CC: Skin  "Check ([facility])      Reviewed patients past medical history and pertinent chart review prior to patient's visit today.     HPI:  Ms. Gordon Stack is a 43 year old adult who presents today as a new patient for [facility]. Gordon is a male to female transgender person.     Today patient reported a spot of concern on the back which the patient's PCP noted.     Reported that his mother has a hx of MM.     Patient is otherwise feeling well, without additional concerns.    Labs:  N/A    Physical Exam:  Vitals: There were no vitals taken for this visit.  SKIN: Full skin, which includes the head/face, both arms, chest, back, abdomen,both legs, genitalia and/or groin buttocks, digits and/or nails, was examined.   - Shaw's skin type II, has <100 nevi  - There are dome shaped bright red papules on the trunk.   - Multiple regular brown pigmented macules and papules are identified on the trunk and extremities.   - Scattered brown macules on sun exposed areas.  - There are waxy stuck on tan to brown papules on the trunk.    - L upper back there is a 1 cm brown irregular macule   - R calf there is a 3 mm dark brown macule   - No other lesions of concern on areas examined.             Medications:  Current Outpatient Medications   Medication Sig Dispense Refill     BD DISP NEEDLE 23G X 1\" MISC USE FOR ESTRADIOL WEEKLY INJECTIONS 25 each 3     estradiol valerate (DELESTROGEN) 20 MG/ML injection INJECT 1 ML (20 MG) INTO THE MUSCLE EVERY 28 DAYS. (DISCARD VIAL 28 DAYS AFTER FIRST USE) 5 mL 1     fluticasone (FLONASE) 50 MCG/ACT nasal spray SPRAY 1 SPRAY INTO BOTH NOSTRILS DAILY 15.8 mL 1     levothyroxine (SYNTHROID/LEVOTHROID) 200 MCG tablet Take 1 tablet (200 mcg) by mouth daily 90 tablet 4     lisinopril (ZESTRIL) 10 MG tablet TAKE ONE TABLET BY MOUTH ONCE DAILY 90 tablet 1     mineral oil-hydrophilic petrolatum (AQUAPHOR) external ointment Apply topically every 8 hours Apply to incision. 50 g 1     Needle, Disp, 23G X 1\" MISC    "     spironolactone (ALDACTONE) 100 MG tablet Take 1 tablet (100 mg) by mouth daily 90 tablet 3     syringe, disposable, (B-D SYRINGE LUER-EUSEBIO) 1 ML MISC 1 mL once a week 25 each 3     No current facility-administered medications for this visit.      Past Medical/Surgical History:   Patient Active Problem List   Diagnosis     Gender dysphoria in adult     Pancreatic cyst     Benign essential hypertension     Treated H pylori with ulcer     Hyperlipidemia LDL goal <100     Globus sensation     Nasal congestion     Tobacco abuse     Papillary thyroid carcinoma (H)     Postoperative hypothyroidism     Low serum parathyroid hormone (PTH)     Exposure to iodine     Male-to-female transgender person     Obesity (BMI 30-39.9)     Prediabetes     Hypocalcemia     Past Medical History:   Diagnosis Date     Gender dysphoria      Hypertension      Mixed hyperlipidemia      Obesity      Pancreatic cyst      Papillary thyroid carcinoma                         Again, thank you for allowing me to participate in the care of your patient.        Sincerely,        Lynette Bansal PA-C

## 2024-05-02 NOTE — PATIENT INSTRUCTIONS
Wound Care After a Biopsy    What is a skin biopsy?  A skin biopsy allows the doctor to examine a very small piece of tissue under the microscope to determine the diagnosis and the best treatment for the skin condition. A local anesthetic (numbing medicine)  is injected with a very small needle into the skin area to be tested. A small piece of skin is taken from the area. Sometimes a suture (stitch) is used.     What are the risks of a skin biopsy?  I will experience scar, bleeding, swelling, pain, crusting and redness. I may experience incomplete removal or recurrence. Risks of this procedure are excessive bleeding, bruising, infection, nerve damage, numbness, thick (hypertrophic or keloidal) scar and non-diagnostic biopsy.    How should I care for my wound for the first 24 hours?  Keep the wound dry and covered for 24 hours  If it bleeds, hold direct pressure on the area for 15 minutes. If bleeding does not stop then go to the emergency room  Avoid strenuous exercise the first 1-2 days or as your doctor instructs you    How should I care for the wound after 24 hours?  After 24 hours, remove the bandage  You may bathe or shower as normal  If you had a scalp biopsy, you can shampoo as usual and can use shower water to clean the biopsy site daily  Clean the wound twice a day with gentle soap and water  Do not scrub, be gentle  Apply white petroleum/Vaseline after cleaning the wound with a cotton swab or a clean finger, and keep the site covered with a Bandaid /bandage. Bandages are not necessary with a scalp biopsy  If you are unable to cover the site with a Bandaid /bandage, re-apply ointment 2-3 times a day to keep the site moist. Moisture will help with healing  Avoid strenuous activity for first 1-2 days  Avoid lakes, rivers, pools, and oceans until the stitches are removed or the site is healed    How do I clean my wound?  Wash hands thoroughly with soap or use hand  before all wound care  Clean the  wound with gentle soap and water  Apply white petroleum/Vaseline  to wound after it is clean  Replace the Bandaid /bandage to keep the wound covered for the first few days or as instructed by your doctor  If you had a scalp biopsy, warm shower water to the area on a daily basis should suffice    What should I use to clean my wound?   Cotton-tipped applicators (Qtips )  White petroleum jelly (Vaseline ). Use a clean new container and use Q-tips to apply.  Bandaids   as needed  Gentle soap     How should I care for my wound long term?  Do not get your wound dirty  Keep up with wound care for one week or until the area is healed.  A small scab will form and fall off by itself when the area is completely healed. The area will be red and will become pink in color as it heals. Sun protection is very important for how your scar will turn out. Sunscreen with an SPF 30 or greater is recommended once the area is healed.  If you have stitches, stitches need to be removed in 10 days. You may return to our clinic for this or you may have it done locally at your doctor s office.  You should have some soreness but it should be mild and slowly go away over several days. Talk to your doctor about using tylenol for pain,    When should I call my doctor?  If you have increased:   Pain or swelling  Pus or drainage (clear or slightly yellow drainage is ok)  Temperature over 100F  Spreading redness or warmth around wound    When will I hear about my results?  The biopsy results can take 2-3 weeks to come back. The clinic will call you with the results, send you a [employer] message, or have you schedule a follow-up clinic or phone time to discuss the results. Contact our clinics if you do not hear from us in 3 weeks.     Who should I call with questions?  [facility]: [phone]   [facility]: [phone]  For urgent needs outside of business hours call the [facility]  at [phone] and ask for the dermatology resident on call      Patient Education       Proper skin care from [facility]:    -Eliminate harsh soaps as they strip the natural oils from the skin, often resulting in dry itchy skin ( i.e. Dial, Zest, Marielena Spring)  -Use mild soaps such as Cetaphil or Dove Sensitive Skin in the shower. You do not need to use soap on arms, legs, and trunk every time you shower unless visibly soiled.   -Avoid hot or cold showers.  -After showering, lightly dry off and apply moisturizing within 2-3 minutes. This will help trap moisture in the skin.   -Aggressive use of a moisturizer at least 1-2 times a day to the entire body (including -Vanicream, Cetaphil, Aquaphor or Cerave) and moisturize hands after every washing.  -We recommend using moisturizers that come in a tub that needs to be scooped out, not a pump. This has more of an oil base. It will hold moisture in your skin much better than a water base moisturizer. The above recommended are non-pore clogging.      Wear a sunscreen with at least SPF 30 on your face, ears, neck and V of the chest daily. Wear sunscreen on other areas of the body if those areas are exposed to the sun throughout the day. Sunscreens can contain physical and/or chemical blockers. Physical blockers are less likely to clog pores, these include zinc oxide and titanium dioxide. Reapply every two hour and after swimming.     Sunscreen examples: https://www.ewg.org/sunscreen/    UV radiation  UVA radiation remains constant throughout the day and throughout the year. It is a longer wavelength than UVB and therefore penetrates deeper into the skin leading to immediate and delayed tanning, photoaging, and skin cancer. 70-80% of UVA and UVB radiation occurs between the hours of 10am-2pm.  UVB radiation  UVB radiation causes the most harmful effects and is more significant during the summer months. However, snow and ice can reflect UVB radiation leading to  skin damage during the winter months as well. UVB radiation is responsible for tanning, burning, inflammation, delayed erythema (pinkness), pigmentation (brown spots), and skin cancer.     I recommend self monthly full body exams and yearly full body exams with a dermatology provider. If you develop a new or changing lesion please follow up for examination. Most skin cancers are pink and scaly or pink and pearly. However, we do see blue/brown/black skin cancers.  Consider the ABCDEs of melanoma when giving yourself your monthly full body exam ( don't forget the groin, buttocks, feet, toes, etc). A-asymmetry, B-borders, C-color, D-diameter, E-elevation or evolving. If you see any of these changes please follow up in clinic. If you cannot see your back I recommend purchasing a hand held mirror to use with a larger wall mirror.       Checking for Skin Cancer  You can find cancer early by checking your skin each month. There are 3 kinds of skin cancer. They are melanoma, basal cell carcinoma, and squamous cell carcinoma. Doing monthly skin checks is the best way to find new marks or skin changes. Follow the instructions below for checking your skin.   The ABCDEs of checking moles for melanoma   Check your moles or growths for signs of melanoma using ABCDE:   Asymmetry: the sides of the mole or growth don t match  Border: the edges are ragged, notched, or blurred  Color: the color within the mole or growth varies  Diameter: the mole or growth is larger than 6 mm (size of a pencil eraser)  Evolving: the size, shape, or color of the mole or growth is changing (evolving is not shown in the images below)    Checking for other types of skin cancer  Basal cell carcinoma or squamous cell carcinoma have symptoms such as:     A spot or mole that looks different from all other marks on your skin  Changes in how an area feels, such as itching, tenderness, or pain  Changes in the skin's surface, such as oozing, bleeding, or  scaliness  A sore that does not heal  New swelling or redness beyond the border of a mole    Who s at risk?  Anyone can get skin cancer. But you are at greater risk if you have:   Fair skin, light-colored hair, or light-colored eyes  Many moles or abnormal moles on your skin  A history of sunburns from sunlight or tanning beds  A family history of skin cancer  A history of exposure to radiation or chemicals  A weakened immune system  If you have had skin cancer in the past, you are at risk for recurring skin cancer.   How to check your skin  Do your monthly skin checkups in front of a full-length mirror. Check all parts of your body, including your:   Head (ears, face, neck, and scalp)  Torso (front, back, and sides)  Arms (tops, undersides, upper, and lower armpits)  Hands (palms, backs, and fingers, including under the nails)  Buttocks and genitals  Legs (front, back, and sides)  Feet (tops, soles, toes, including under the nails, and between toes)  If you have a lot of moles, take digital photos of them each month. Make sure to take photos both up close and from a distance. These can help you see if any moles change over time.   Most skin changes are not cancer. But if you see any changes in your skin, call your doctor right away. Only he or she can diagnose a problem. If you have skin cancer, seeing your doctor can be the first step toward getting the treatment that could save your life.   [employer] last reviewed this educational content on 4/1/2019 2000-2020 [employer]. [address] [place] [address], [place] [address]. All rights reserved. This information is not intended as a substitute for professional medical care. Always follow your healthcare professional's instructions.       When should I call my doctor?  If you are worsening or not improving, please, contact us or seek urgent care as noted below.     Who should I call with questions (adults)?  [facility] (adult  and pediatric): [phone]  [facility] (adult): [phone]  [facility] ([place], [place], [place] and [place]) [phone]  For urgent needs outside of business hours call the [facility] at [phone] and ask for the dermatology resident on call to be paged  If this is a medical emergency and you are unable to reach an ER, Call [phone]      If you need a prescription refill, please contact your pharmacy. Refills are approved or denied by our Physicians during normal business hours, Monday through Fridays  Per office policy, refills will not be granted if you have not been seen within the past year (or sooner depending on your child's condition)

## 2024-05-02 NOTE — PROGRESS NOTES
[facility] Dermatology Note  Encounter Date: May 2, 2024  Office Visit      Dermatology Problem List:  FBSE 5/2/24    #NUB x 2, S/p Biopsy performed on 5/2/24: Pending results.     Family hx: Mother and Grandmother have a hx of MM.   Social: Grew up in [place]   ____________________________________________    Assessment & Plan:  # Neoplasm of unspecified behavior of the skin (D49.2) on the L upper back. The differential diagnosis includes DN vs MM vs other.   - Shave biopsy performed today, see procedure note below.   - Photographed today     # Neoplasm of unspecified behavior of the skin (D49.2) on the R calf.  The differential diagnosis includes DN vs MM vs other   - Shave biopsy performed today, see procedure note below.   - Photographed today     # Family hx of MM, Mother & Grandmother  - Sunscreen: Apply 20 minutes prior to going outdoors and reapply every two hours, when wet or sweating. We recommend using an SPF 30 or higher, and to use one that is water resistant.     - Advised to monitor for changing, non-healing, bleeding, painful, changing, or otherwise symptomatic lesions  - Continue annual skin exam    # Benign findings: multiple benign nevi, lentigines, cherry angiomas, SKs  - edu on benign etiology  - Signs and Symptoms of non-melanoma skin cancer and ABCDEs of melanoma reviewed with patient. Patient encouraged to perform monthly self skin exams and educated on how to perform them. UV precautions reviewed with patient. Patient was asked about new or changing moles/lesions on body.   - Sunscreen: Apply 20 minutes prior to going outdoors and reapply every two hours, when wet or sweating. We recommend using an SPF 30 or higher, and to use one that is water resistant.     - RTC for changes    Procedures Performed:   - Shave biopsy x2 procedure note, location(s): see above. After discussion of benefits and risks including but not limited to bleeding, infection, scar, incomplete removal,  recurrence, and non-diagnostic biopsy, written consent and photographs were obtained. The area was cleaned with isopropyl alcohol. 0.5mL of 1% lidocaine with epinephrine was injected to obtain adequate anesthesia of lesion(s). Shave biopsy at site(s) performed. Hemostasis was achieved with aluminium chloride. Petrolatum ointment and a sterile dressing were applied. The patient tolerated the procedure and no complications were noted. The patient was provided with verbal and written post care instructions.      Follow-up: 1 year(s) in-person, or earlier for new or changing lesions    Staff and scribe:   Scribe Disclosure:   I, GEORGETTE HARE, am serving as a scribe; to document services personally performed by Lynette Bansal PA-C -based on data collection and the provider's statements to me.     Provider Disclosure:  I agree with above History, Review of Systems, Physical exam and Plan.  I have reviewed the content of the documentation and have edited it as needed. I have personally performed the services documented here and the documentation accurately represents those services and the decisions I have made.      Electronically signed by:    All risks, benefits and alternatives were discussed with patient.  Patient is in agreement and understands the assessment and plan.  All questions were answered.    Lynette Bansal PA-C, MPAS  [facility] [place]: Phone: [phone], Fax: [phone]  [facility]: Phone: [phone],  Fax: [phone]  [facility]: Phone: [phone], Fax: [phone]  ____________________________________________    CC: Skin Check (FBSC)      Reviewed patients past medical history and pertinent chart review prior to patient's visit today.     HPI:  Ms. Gordon Stack is a 43 year old adult who presents today as a new patient for FBSC. Gordon is a male to female transgender person.  "    Today patient reported a spot of concern on the back which the patient's PCP noted.     Reported that his mother has a hx of MM.     Patient is otherwise feeling well, without additional concerns.    Labs:  N/A    Physical Exam:  Vitals: There were no vitals taken for this visit.  SKIN: Full skin, which includes the head/face, both arms, chest, back, abdomen,both legs, genitalia and/or groin buttocks, digits and/or nails, was examined.   - Shaw's skin type II, has <100 nevi  - There are dome shaped bright red papules on the trunk.   - Multiple regular brown pigmented macules and papules are identified on the trunk and extremities.   - Scattered brown macules on sun exposed areas.  - There are waxy stuck on tan to brown papules on the trunk.    - L upper back there is a 1 cm brown irregular macule   - R calf there is a 3 mm dark brown macule   - No other lesions of concern on areas examined.             Medications:  Current Outpatient Medications   Medication Sig Dispense Refill    BD DISP NEEDLE 23G X 1\" MISC USE FOR ESTRADIOL WEEKLY INJECTIONS 25 each 3    estradiol valerate (DELESTROGEN) 20 MG/ML injection INJECT 1 ML (20 MG) INTO THE MUSCLE EVERY 28 DAYS. (DISCARD VIAL 28 DAYS AFTER FIRST USE) 5 mL 1    fluticasone (FLONASE) 50 MCG/ACT nasal spray SPRAY 1 SPRAY INTO BOTH NOSTRILS DAILY 15.8 mL 1    levothyroxine (SYNTHROID/LEVOTHROID) 200 MCG tablet Take 1 tablet (200 mcg) by mouth daily 90 tablet 4    lisinopril (ZESTRIL) 10 MG tablet TAKE ONE TABLET BY MOUTH ONCE DAILY 90 tablet 1    mineral oil-hydrophilic petrolatum (AQUAPHOR) external ointment Apply topically every 8 hours Apply to incision. 50 g 1    Needle, Disp, 23G X 1\" MISC       spironolactone (ALDACTONE) 100 MG tablet Take 1 tablet (100 mg) by mouth daily 90 tablet 3    syringe, disposable, (B-D SYRINGE LUER-EUSEBIO) 1 ML MISC 1 mL once a week 25 each 3     No current facility-administered medications for this visit.      Past Medical/Surgical " History:   Patient Active Problem List   Diagnosis    Gender dysphoria in adult    Pancreatic cyst    Benign essential hypertension    Treated H pylori with ulcer    Hyperlipidemia LDL goal <100    Globus sensation    Nasal congestion    Tobacco abuse    Papillary thyroid carcinoma (H)    Postoperative hypothyroidism    Low serum parathyroid hormone (PTH)    Exposure to iodine    Male-to-female transgender person    Obesity (BMI 30-39.9)    Prediabetes    Hypocalcemia     Past Medical History:   Diagnosis Date    Gender dysphoria     Hypertension     Mixed hyperlipidemia     Obesity     Pancreatic cyst     Papillary thyroid carcinoma

## 2024-05-03 LAB
PATH REPORT.COMMENTS IMP SPEC: NORMAL
PATH REPORT.COMMENTS IMP SPEC: NORMAL
PATH REPORT.FINAL DX SPEC: NORMAL
PATH REPORT.GROSS SPEC: NORMAL
PATH REPORT.MICROSCOPIC SPEC OTHER STN: NORMAL
PATH REPORT.RELEVANT HX SPEC: NORMAL

## 2024-06-08 ENCOUNTER — HEALTH MAINTENANCE LETTER (OUTPATIENT)
Age: 44
End: 2024-06-08

## 2024-07-19 ENCOUNTER — LAB (OUTPATIENT)
Dept: LAB | Facility: CLINIC | Age: 44
End: 2024-07-19
Payer: COMMERCIAL

## 2024-07-19 DIAGNOSIS — E89.0 POSTOPERATIVE HYPOTHYROIDISM: ICD-10-CM

## 2024-07-19 DIAGNOSIS — E83.51 HYPOCALCEMIA: ICD-10-CM

## 2024-07-19 DIAGNOSIS — C73 PAPILLARY THYROID CARCINOMA (H): ICD-10-CM

## 2024-07-19 DIAGNOSIS — R73.03 PREDIABETES: ICD-10-CM

## 2024-07-19 LAB
CALCIUM SERPL-MCNC: 9.1 MG/DL (ref 8.8–10.4)
HBA1C MFR BLD: 5.9 %
PHOSPHATE SERPL-MCNC: 4 MG/DL (ref 2.5–4.5)
T4 FREE SERPL-MCNC: 2.18 NG/DL (ref 0.9–1.7)
TSH SERPL DL<=0.005 MIU/L-ACNC: 1.33 UIU/ML (ref 0.3–4.2)
VIT D+METAB SERPL-MCNC: 28 NG/ML (ref 20–50)

## 2024-07-19 PROCEDURE — 36415 COLL VENOUS BLD VENIPUNCTURE: CPT

## 2024-07-19 PROCEDURE — 86800 THYROGLOBULIN ANTIBODY: CPT

## 2024-07-19 PROCEDURE — 82310 ASSAY OF CALCIUM: CPT

## 2024-07-19 PROCEDURE — 82306 VITAMIN D 25 HYDROXY: CPT

## 2024-07-19 PROCEDURE — 84439 ASSAY OF FREE THYROXINE: CPT

## 2024-07-19 PROCEDURE — 84443 ASSAY THYROID STIM HORMONE: CPT

## 2024-07-19 PROCEDURE — 83036 HEMOGLOBIN GLYCOSYLATED A1C: CPT

## 2024-07-19 PROCEDURE — 84100 ASSAY OF PHOSPHORUS: CPT

## 2024-07-25 LAB — SCANNED LAB RESULT: NORMAL

## 2024-07-26 DIAGNOSIS — C73 PAPILLARY THYROID CARCINOMA (H): ICD-10-CM

## 2024-07-26 DIAGNOSIS — E89.0 POSTOPERATIVE HYPOTHYROIDISM: ICD-10-CM

## 2024-07-26 RX ORDER — LEVOTHYROXINE SODIUM 200 UG/1
TABLET ORAL
Qty: 94 TABLET | Refills: 4 | Status: SHIPPED | OUTPATIENT
Start: 2024-07-26

## 2024-12-17 DIAGNOSIS — F64.0 GENDER DYSPHORIA IN ADULT: ICD-10-CM

## 2024-12-17 RX ORDER — SYRINGE, DISPOSABLE, 1 ML
1 SYRINGE, EMPTY DISPOSABLE MISCELLANEOUS WEEKLY
Qty: 25 EACH | Refills: 3 | Status: SHIPPED | OUTPATIENT
Start: 2024-12-17

## 2025-03-10 ENCOUNTER — LAB (OUTPATIENT)
Dept: LAB | Facility: CLINIC | Age: 45
End: 2025-03-10
Payer: COMMERCIAL

## 2025-03-10 ENCOUNTER — ANCILLARY PROCEDURE (OUTPATIENT)
Dept: ULTRASOUND IMAGING | Facility: CLINIC | Age: 45
End: 2025-03-10
Payer: COMMERCIAL

## 2025-03-10 DIAGNOSIS — C73 PAPILLARY THYROID CARCINOMA (H): ICD-10-CM

## 2025-03-10 DIAGNOSIS — E89.0 POSTOPERATIVE HYPOTHYROIDISM: ICD-10-CM

## 2025-03-10 DIAGNOSIS — E83.51 HYPOCALCEMIA: ICD-10-CM

## 2025-03-10 DIAGNOSIS — R73.03 PREDIABETES: ICD-10-CM

## 2025-03-10 LAB
EST. AVERAGE GLUCOSE BLD GHB EST-MCNC: 123 MG/DL
HBA1C MFR BLD: 5.9 %
T4 FREE SERPL-MCNC: 2.06 NG/DL (ref 0.9–1.7)
TSH SERPL DL<=0.005 MIU/L-ACNC: 0.29 UIU/ML (ref 0.3–4.2)

## 2025-03-10 PROCEDURE — 36415 COLL VENOUS BLD VENIPUNCTURE: CPT | Performed by: PATHOLOGY

## 2025-03-10 PROCEDURE — 99000 SPECIMEN HANDLING OFFICE-LAB: CPT | Performed by: PATHOLOGY

## 2025-03-10 PROCEDURE — 84443 ASSAY THYROID STIM HORMONE: CPT | Performed by: PATHOLOGY

## 2025-03-10 PROCEDURE — 83036 HEMOGLOBIN GLYCOSYLATED A1C: CPT

## 2025-03-10 PROCEDURE — 76536 US EXAM OF HEAD AND NECK: CPT | Mod: GC | Performed by: RADIOLOGY

## 2025-03-10 PROCEDURE — 84432 ASSAY OF THYROGLOBULIN: CPT

## 2025-03-10 PROCEDURE — 84439 ASSAY OF FREE THYROXINE: CPT | Performed by: PATHOLOGY

## 2025-03-18 ENCOUNTER — VIRTUAL VISIT (OUTPATIENT)
Dept: ENDOCRINOLOGY | Facility: CLINIC | Age: 45
End: 2025-03-18
Payer: COMMERCIAL

## 2025-03-18 DIAGNOSIS — E89.0 POSTOPERATIVE HYPOTHYROIDISM: ICD-10-CM

## 2025-03-18 DIAGNOSIS — C73 PAPILLARY THYROID CARCINOMA (H): Primary | ICD-10-CM

## 2025-03-18 DIAGNOSIS — R73.03 PREDIABETES: ICD-10-CM

## 2025-03-18 PROCEDURE — 98006 SYNCH AUDIO-VIDEO EST MOD 30: CPT

## 2025-03-18 PROCEDURE — G2211 COMPLEX E/M VISIT ADD ON: HCPCS

## 2025-03-18 PROCEDURE — 1126F AMNT PAIN NOTED NONE PRSNT: CPT | Mod: 95

## 2025-03-18 NOTE — LETTER
3/18/2025       RE: Gordon Stack  [address]  [facility] [address]     Dear Colleague,    Thank you for referring your patient, Gordon Stack, to the [facility]OCRINOLOG[facility] [place] at [facility]. Please see a copy of my visit note below.    02/24/25 2:41 PM  PATIENT LAB/IMAGING STATUS : Appt scheduled / Day of appt 3/10/25         Endocrine video visit note    Attending Assessment/Plan :      Papillary thyroid carcinoma 5.6 cm, right, +BRAF V600E mutation , extensive angioinvasion, + 14/52 LN including level 2-4, 7, largest metastatic deposit 1 cm .  Treatment has been total Tx, CND, LND, adjuvant 131I 102 mCi  MACIS 5.04 assuming no distant  pT3a,pN1b, cMx  PRIYA recurrence risk intermediate to high  Tg is pending  Repeat neck US before next visit in a year - watching level 7 and left level 4     Post surgical hypothyroidism - on LT4  200 x 7.5/week since 7/2024 and with TSH at target  < 0.4     Transgender female (pronouns she/her hers) on gender affirming hormone therapy estradiol, spironolactone. This has been managed by primary care at [facility].   Not addressed     Prediabetes - follow HgbA1c     BMI 30.2 - as above .     The Longitudinal plan of care for postsurgical hypothyroidism related to thyroid cancer/thyroid cancer surveillance/treatment was addressed during this visit. Due to added complexity of care, we will continue to support Gordon , and the subsequent management of this condition(s) and with the ongoing continuity of care of this condition.    Due to the continued risks of COVID 19 transmission and to improve ease of access this visit was a tvideo visit.   The patient gave verbal consent for the visit today.    I have independently reviewed and interpreted labs, imaging as indicated.     Distant Location (provider location):  Off-site  Mode of Communication:  Video Conference via [facility]  review/prep time 1  [id number]; 1351  Visit Start time 1628  Visit Stop time 1633   _30_ I spent minutes spent on the date of the encounter doing chart review, history and exam, documentation and further activities as noted above .    Shefali Diaz MD    Chief complaint: HISTORY OF PRESENT ILLNESS    Gordon presents for follow up of papillary thyroid carcinoma, post surgical hypothyroidism.  She is also a transgender female on gender affirming hormone therapy.    She was last seen by me 3/18/24.  At that time  she was on LT4  175 x 7.5/week (187.5 mcg/day) and she is now on 200 x 7.5/week. Labs and neck US have already been done in anticipation of this appt.     Right neck mass concern was noted  November 2022.   Work up was as follows:   12/22/22 FNAB right thyroid nodule papillary thyroid carcinoma  2/13/23 FNAB lymph node metastatic Papillary thyroid carcinoma    Thyroid cancer treatment has been as follows:   2/21/2023 total thyroidectomy, right level 2-4, 6, left parathyroid reimplantation x 2 (Dr Krishnamurthy): Papillary thyroid carcinoma 5.6 cm, right, +BRAF V600E mutation , extensive angioinvasion, + 14/52 LN including level 2-4, 7, largest metastatic deposit 1 cm .  S  5/4/23 2 dose thyrogen stimulated 131I 102.7 mCi     Endocrine relevant labs are as follows:  11/16/2020 testosterone 12, estradiol 220  11/11/22 estradiol 138  2/14/2023  testosterone 14  2/21/2023 PTH 4, iCa 4.4  3/29/2023 Tg 1.6, PRIYA < 0.4 , TSH 18.03, free t4 1.17, Ca 9.5, phos 4.4, PTH 19; urine iodine 66 mcg/L, 66 mcg/g creatinine-- increase LT4 to 175 x 7.5/week divided (1.7 mcg/kg/day dose).  Taper off calcitriol   5/4/23 2 dose Thyrogen stimulated Tg 2, PRIYA < 0.4, TSh 96.92,   7/5/23 HgbA1c 6%, cholesterol 211, TG 67, HDL 50, LDl 148  7/27/23 Tg 0.14, PRIYA < 0.4, TSh 1.67, free T4 1.88  10/25/23 TSH 1.38, free T4 1.66, estradiol 212, testosterone 17  1/29/24 Tg 0.13, PRIYA < 0.4, TSH 0.33, free T4 1.95 , Ca 8.4,   7/19/24 Tg 0.12  "(concurrent 0.14) , PRIYA < 0.4, TSh 1.33, free T4 2.18, Ca 9.1, phos 4, HgbA1c 5.9%  7/26/24 increase to LT4 200 x 7.5/week   3/10/25 Tg is pending TSh 0.29, free T4 2.06 , HgbA1c 5.9%    Relevant imaging is as follows: (as read by me as it pertains to endocrine relevant organs)  12/12/22 CT neck with contrast  4.1 x 5.8 x    5/4/23 131I 102.7 mCi  5/9/23 post therapy TBS   activity right neck   3/10/25 neck US compared with 2/1/24,  12/13/22 and 1/18/23   Left level 4# 1  0.4 x 0.6 x 0.7 cm was 0.4 x 0.6 x 0.7 cm   Left level 7#1 0.4 x 0.3 x 0.4 cm - I don't believe we have comparable cine in 2024; not seen on 1/18/23 cine     On gender affirming therapy with  estrogen since age 37 ;     REVIEW OF SYSTEMS  Feeling great   Energy great  Sleep phenomenal   Weight stable -   Cardiac: negative  Respiratory: negative   GI: negative     Past Medical History  Past Medical History:   Diagnosis Date     Gender dysphoria      Hypertension      Male-to-female transgender person      Mixed hyperlipidemia      Obesity      Pancreatic cyst      Papillary thyroid carcinoma      Prediabetes 2024     Past Surgical History:   Procedure Laterality Date     IR FINE NEEDLE ASPIRATION W ULTRASOUND  02/13/2023     THYROIDECTOMY N/A 02/21/2023    Procedure: THYROIDECTOMY, TOTAL with right neck dissection; parathyroid reimplantation x2;  Surgeon: Prema Krishnamurthy MD;  Location:  OR     Medications  Current Outpatient Medications   Medication Sig Dispense Refill     BD DISP NEEDLE 23G X 1\" MISC USE FOR ESTRADIOL WEEKLY INJECTIONS 25 each 3     estradiol valerate (DELESTROGEN) 20 MG/ML injection INJECT 1 ML (20 MG) INTO THE MUSCLE EVERY 28 DAYS. (DISCARD VIAL 28 DAYS AFTER FIRST USE) 5 mL 1     fluticasone (FLONASE) 50 MCG/ACT nasal spray SPRAY 1 SPRAY INTO BOTH NOSTRILS DAILY 15.8 mL 1     levothyroxine (SYNTHROID/LEVOTHROID) 200 MCG tablet MON to SAT 1 tablet/day; SUN 1.5 tablet 94 tablet 4     lisinopril (ZESTRIL) 10 MG tablet TAKE " "ONE TABLET BY MOUTH ONCE DAILY 90 tablet 1     mineral oil-hydrophilic petrolatum (AQUAPHOR) external ointment Apply topically every 8 hours Apply to incision. 50 g 1     Needle, Disp, 23G X 1\" MISC        spironolactone (ALDACTONE) 100 MG tablet Take 1 tablet (100 mg) by mouth daily 90 tablet 3     syringe, disposable, (B-D SYRINGE LUER-EUSEBIO) 1 ML MISC 1 mL once a week. 25 each 3     Estrogen is 0.2 ml once/week     Allergies  No Known Allergies    Family History  Family History   Problem Relation Age of Onset     Melanoma Mother      Asthma Brother      Hyperthyroidism Maternal Grandmother      Prostate Cancer Maternal Grandfather 98     Melanoma Paternal Grandmother      Thyroid Cancer No family hx of      Social History  Social History     Tobacco Use     Smoking status: Some Days     Smokeless tobacco: Never   Vaping Use     Vaping status: Never Used   Substance Use Topics     Alcohol use: Yes     Comment: occ     Drug use: No   Exercise is pelaton x 30 min/day    Physical Exam  GENERAL: pleasant person in no distress  BP Readings from Last 1 Encounters:   10/29/23 (!) 143/92      Pulse Readings from Last 1 Encounters:   10/29/23 98      Resp Readings from Last 1 Encounters:   10/29/23 18      Temp Readings from Last 1 Encounters:   10/29/23 97  F (36.1  C) (Temporal)      SpO2 Readings from Last 1 Encounters:   10/29/23 98%      Wt Readings from Last 1 Encounters:   03/18/24 104.8 kg (231 lb)      Ht Readings from Last 1 Encounters:   10/30/23 1.854 m (6' 1\")     SKIN: Visible skin clear..  EYES: Eyes grossly normal to inspection.    NECK: no visible masses; no grossly visible goiter  RESP: No audible wheeze, cough, or  increased work of breathing.    NEURO:  Awake, alert, responds appropriately to questions.  Mentation and speech fluent.  PSYCH:affect normal, and appearance well-groomed.    DATA REVIEW     Latest Ref Rng 10/25/2023  7:12 AM 1/29/2024  7:11 AM 7/19/2024  7:06 AM 3/10/2025  9:39 AM   ENDO " THYROID LABS-[facility]        TSH 0.30 - 4.20 uIU/mL 1.38  0.33  1.33  0.29 (L)    Triiodothyronine (T3) 85 - 202 ng/dL       FREE T4 0.90 - 1.70 ng/dL 1.66  1.95 (H)  2.18 (H)  2.06 (H)       Legend:  (H) High  (L) Low    EXAMINATION: US HEAD NECK SOFT TISSUE, 3/10/2025 10:18 AM    COMPARISON: Neck ultrasound 2/1/2024, 1/18/2023  HISTORY: Papillary thyroid carcinoma (H); Postoperative hypothyroidism; Prediabetes; Hypocalcemia  TECHNIQUE: Sonographic imaging performed of the neck to evaluate for lymph nodes using grey scale and limited Doppler technique.  FINDINGS:  Lymph nodes are measured bilaterally with measurements given in transverse, AP, and craniocaudal dimensions as follows:  Right:  No suspicious right neck lymph nodes identified.  Left:  Level 2: Lymph node measuring 1.2 x 0.7 x 1.3 cm with normal fatty hilum. Lymph node measuring 1.1 x 0.5 x 1.8 cm without distinct fatty hilum, stable in size.  Level 4: Hypoechoic nodule measuring 0.4 x 0.6 x 0.7 cm with small fatty hilum, stable.  Level 5: Subcentimeter lymph node with normal fatty juan.  Level 7: Subcentimeter lymph node with normal fatty juan.                                                        IMPRESSION:  Left level 2 lymph node marked as #2 is grossly stable in size, though hilum is decreased in prominence. Consider attention on follow-up.  JODY THORNTON MD          Again, thank you for allowing me to participate in the care of your patient.      Sincerely,    Shefali Diaz MD

## 2025-03-18 NOTE — PROGRESS NOTES
Endocrine video visit note    Attending Assessment/Plan :      Papillary thyroid carcinoma 5.6 cm, right, +BRAF V600E mutation , extensive angioinvasion, + 14/52 LN including level 2-4, 7, largest metastatic deposit 1 cm .  Treatment has been total Tx, CND, LND, adjuvant 131I 102 mCi  MACIS 5.04 assuming no distant  pT3a,pN1b, cMx  PRIYA recurrence risk intermediate to high  Tg is pending  Repeat neck US before next visit in a year - watching level 7 and left level 4     Post surgical hypothyroidism - on LT4  200 x 7.5/week since 7/2024 and with TSH at target  < 0.4     Transgender female (pronouns she/her hers) on gender affirming hormone therapy estradiol, spironolactone. This has been managed by primary care at [facility].   Not addressed     Prediabetes - follow HgbA1c     BMI 30.2 - as above .     The Longitudinal plan of care for postsurgical hypothyroidism related to thyroid cancer/thyroid cancer surveillance/treatment was addressed during this visit. Due to added complexity of care, we will continue to support Gordon , and the subsequent management of this condition(s) and with the ongoing continuity of care of this condition.    Due to the continued risks of COVID 19 transmission and to improve ease of access this visit was a tvideo visit.   The patient gave verbal consent for the visit today.    I have independently reviewed and interpreted labs, imaging as indicated.     Distant Location (provider location):  Off-site  Mode of Communication:  Video Conference via WUT  Chart review/prep time 1  [id number]; 1351  Visit Start time 1628  Visit Stop time 1633   _30_ I spent minutes spent on the date of the encounter doing chart review, history and exam, documentation and further activities as noted above .    Shefali Diaz MD    Chief complaint: HISTORY OF PRESENT ILLNESS    Gordon presents for follow up of papillary thyroid carcinoma, post surgical hypothyroidism.  She is also a transgender  female on gender affirming hormone therapy.    She was last seen by me 3/18/24.  At that time  she was on LT4  175 x 7.5/week (187.5 mcg/day) and she is now on 200 x 7.5/week. Labs and neck US have already been done in anticipation of this appt.     Right neck mass concern was noted  November 2022.   Work up was as follows:   12/22/22 FNAB right thyroid nodule papillary thyroid carcinoma  2/13/23 FNAB lymph node metastatic Papillary thyroid carcinoma    Thyroid cancer treatment has been as follows:   2/21/2023 total thyroidectomy, right level 2-4, 6, left parathyroid reimplantation x 2 (Dr Krishnamurthy): Papillary thyroid carcinoma 5.6 cm, right, +BRAF V600E mutation , extensive angioinvasion, + 14/52 LN including level 2-4, 7, largest metastatic deposit 1 cm .  S  5/4/23 2 dose thyrogen stimulated 131I 102.7 mCi     Endocrine relevant labs are as follows:  11/16/2020 testosterone 12, estradiol 220  11/11/22 estradiol 138  2/14/2023  testosterone 14  2/21/2023 PTH 4, iCa 4.4  3/29/2023 Tg 1.6, PRIYA < 0.4 , TSH 18.03, free t4 1.17, Ca 9.5, phos 4.4, PTH 19; urine iodine 66 mcg/L, 66 mcg/g creatinine-- increase LT4 to 175 x 7.5/week divided (1.7 mcg/kg/day dose).  Taper off calcitriol   5/4/23 2 dose Thyrogen stimulated Tg 2, PRIYA < 0.4, TSh 96.92,   7/5/23 HgbA1c 6%, cholesterol 211, TG 67, HDL 50, LDl 148  7/27/23 Tg 0.14, PRIYA < 0.4, TSh 1.67, free T4 1.88  10/25/23 TSH 1.38, free T4 1.66, estradiol 212, testosterone 17  1/29/24 Tg 0.13, PRIYA < 0.4, TSH 0.33, free T4 1.95 , Ca 8.4,   7/19/24 Tg 0.12 (concurrent 0.14) , PRIYA < 0.4, TSh 1.33, free T4 2.18, Ca 9.1, phos 4, HgbA1c 5.9%  7/26/24 increase to LT4 200 x 7.5/week   3/10/25 Tg is pending TSh 0.29, free T4 2.06 , HgbA1c 5.9%    Relevant imaging is as follows: (as read by me as it pertains to endocrine relevant organs)  12/12/22 CT neck with contrast  4.1 x 5.8 x    5/4/23 131I 102.7 mCi  5/9/23 post therapy TBS   activity right neck   3/10/25 neck US compared with  "2/1/24,  12/13/22 and 1/18/23   Left level 4# 1  0.4 x 0.6 x 0.7 cm was 0.4 x 0.6 x 0.7 cm   Left level 7#1 0.4 x 0.3 x 0.4 cm - I don't believe we have comparable cine in 2024; not seen on 1/18/23 cine     On gender affirming therapy with  estrogen since age 37 ;     REVIEW OF SYSTEMS  Feeling great   Energy great  Sleep phenomenal   Weight stable -   Cardiac: negative  Respiratory: negative   GI: negative     Past Medical History  Past Medical History:   Diagnosis Date    Gender dysphoria     Hypertension     Male-to-female transgender person     Mixed hyperlipidemia     Obesity     Pancreatic cyst     Papillary thyroid carcinoma     Prediabetes 2024     Past Surgical History:   Procedure Laterality Date    IR FINE NEEDLE ASPIRATION W ULTRASOUND  02/13/2023    THYROIDECTOMY N/A 02/21/2023    Procedure: THYROIDECTOMY, TOTAL with right neck dissection; parathyroid reimplantation x2;  Surgeon: Prema Krishnamurthy MD;  Location: [facility]     Medications  Current Outpatient Medications   Medication Sig Dispense Refill    BD DISP NEEDLE 23G X 1\" MISC USE FOR ESTRADIOL WEEKLY INJECTIONS 25 each 3    estradiol valerate (DELESTROGEN) 20 MG/ML injection INJECT 1 ML (20 MG) INTO THE MUSCLE EVERY 28 DAYS. (DISCARD VIAL 28 DAYS AFTER FIRST USE) 5 mL 1    fluticasone (FLONASE) 50 MCG/ACT nasal spray SPRAY 1 SPRAY INTO BOTH NOSTRILS DAILY 15.8 mL 1    levothyroxine (SYNTHROID/LEVOTHROID) 200 MCG tablet MON to SAT 1 tablet/day; SUN 1.5 tablet 94 tablet 4    lisinopril (ZESTRIL) 10 MG tablet TAKE ONE TABLET BY MOUTH ONCE DAILY 90 tablet 1    mineral oil-hydrophilic petrolatum (AQUAPHOR) external ointment Apply topically every 8 hours Apply to incision. 50 g 1    Needle, Disp, 23G X 1\" MISC       spironolactone (ALDACTONE) 100 MG tablet Take 1 tablet (100 mg) by mouth daily 90 tablet 3    syringe, disposable, (B-D SYRINGE LUER-EUSEBIO) 1 ML MISC 1 mL once a week. 25 each 3     Estrogen is 0.2 ml once/week     Allergies  No Known " "Allergies    Family History  Family History   Problem Relation Age of Onset    Melanoma Mother     Asthma Brother     Hyperthyroidism Maternal Grandmother     Prostate Cancer Maternal Grandfather 98    Melanoma Paternal Grandmother     Thyroid Cancer No family hx of      Social History  Social History     Tobacco Use    Smoking status: Some Days    Smokeless tobacco: Never   Vaping Use    Vaping status: Never Used   Substance Use Topics    Alcohol use: Yes     Comment: occ    Drug use: No   Exercise is pelaton x 30 min/day    Physical Exam  GENERAL: pleasant person in no distress  BP Readings from Last 1 Encounters:   10/29/23 (!) 143/92      Pulse Readings from Last 1 Encounters:   10/29/23 98      Resp Readings from Last 1 Encounters:   10/29/23 18      Temp Readings from Last 1 Encounters:   10/29/23 97  F (36.1  C) (Temporal)      SpO2 Readings from Last 1 Encounters:   10/29/23 98%      Wt Readings from Last 1 Encounters:   03/18/24 104.8 kg (231 lb)      Ht Readings from Last 1 Encounters:   10/30/23 1.854 m (6' 1\")     SKIN: Visible skin clear..  EYES: Eyes grossly normal to inspection.    NECK: no visible masses; no grossly visible goiter  RESP: No audible wheeze, cough, or  increased work of breathing.    NEURO:  Awake, alert, responds appropriately to questions.  Mentation and speech fluent.  PSYCH:affect normal, and appearance well-groomed.    DATA REVIEW     Latest Ref Rng 10/25/2023  7:12 AM 1/29/2024  7:11 AM 7/19/2024  7:06 AM 3/10/2025  9:39 AM   ENDO THYROID LABS-[facility]        TSH 0.30 - 4.20 uIU/mL 1.38  0.33  1.33  0.29 (L)    Triiodothyronine (T3) 85 - 202 ng/dL       FREE T4 0.90 - 1.70 ng/dL 1.66  1.95 (H)  2.18 (H)  2.06 (H)       Legend:  (H) High  (L) Low    EXAMINATION: US HEAD NECK SOFT TISSUE, 3/10/2025 10:18 AM    COMPARISON: Neck ultrasound 2/1/2024, 1/18/2023  HISTORY: Papillary thyroid carcinoma (H); Postoperative hypothyroidism; Prediabetes; Hypocalcemia  TECHNIQUE: Sonographic imaging " performed of the neck to evaluate for lymph nodes using grey scale and limited Doppler technique.  FINDINGS:  Lymph nodes are measured bilaterally with measurements given in transverse, AP, and craniocaudal dimensions as follows:  Right:  No suspicious right neck lymph nodes identified.  Left:  Level 2: Lymph node measuring 1.2 x 0.7 x 1.3 cm with normal fatty hilum. Lymph node measuring 1.1 x 0.5 x 1.8 cm without distinct fatty hilum, stable in size.  Level 4: Hypoechoic nodule measuring 0.4 x 0.6 x 0.7 cm with small fatty hilum, stable.  Level 5: Subcentimeter lymph node with normal fatty juan.  Level 7: Subcentimeter lymph node with normal fatty juan.                                                        IMPRESSION:  Left level 2 lymph node marked as #2 is grossly stable in size, though hilum is decreased in prominence. Consider attention on follow-up.  JODY THORNTON MD

## 2025-03-18 NOTE — NURSING NOTE
Current patient location: [address]  [facility] [address]    Is the patient currently in the state of [place]? YES    Visit mode: VIDEO    If the visit is dropped, the patient can be reconnected by:VIDEO VISIT: Text to cell phone:   Telephone Information:   Mobile [phone]       Will anyone else be joining the visit? NO  (If patient encounters technical issues they should call [phone])    Are changes needed to the allergy or medication list? Yes   Pt states the estradiol is 0.2 ml not 0.1.      Are refills needed on medications prescribed by this physician? NO    Rooming Documentation:  Not applicable    Reason for visit: RECHECK (1 year follow-up )    Sydnee MARSH

## 2025-03-18 NOTE — PATIENT INSTRUCTIONS
Continue levothyroxine at present dose 175 x 7.5/week, divided     The thyroglobulin is pending.  They have been taking up to 3 weeks to turn around lately.  I will write when it comes in .    Neck Ultrasound at the [facility] on [facility] prior to next visit in about one year ([phone] to schedule)   Labs about 2-3 weeks prior to next visit in one year

## 2025-03-26 LAB — SCANNED LAB RESULT: ABNORMAL

## 2025-03-27 DIAGNOSIS — E89.0 POSTOPERATIVE HYPOTHYROIDISM: ICD-10-CM

## 2025-03-27 DIAGNOSIS — C73 PAPILLARY THYROID CARCINOMA (H): Primary | ICD-10-CM

## 2025-06-15 ENCOUNTER — HEALTH MAINTENANCE LETTER (OUTPATIENT)
Age: 45
End: 2025-06-15

## 2025-07-28 ENCOUNTER — LAB (OUTPATIENT)
Dept: LAB | Facility: CLINIC | Age: 45
End: 2025-07-28
Payer: COMMERCIAL

## 2025-07-28 DIAGNOSIS — C73 PAPILLARY THYROID CARCINOMA (H): ICD-10-CM

## 2025-07-28 DIAGNOSIS — E89.0 POSTOPERATIVE HYPOTHYROIDISM: ICD-10-CM

## 2025-07-28 LAB
T4 FREE SERPL-MCNC: 2.18 NG/DL (ref 0.9–1.7)
TSH SERPL DL<=0.005 MIU/L-ACNC: 0.23 UIU/ML (ref 0.3–4.2)

## 2025-07-28 PROCEDURE — 84432 ASSAY OF THYROGLOBULIN: CPT | Mod: 90

## 2025-07-28 PROCEDURE — 36415 COLL VENOUS BLD VENIPUNCTURE: CPT

## 2025-07-28 PROCEDURE — 84439 ASSAY OF FREE THYROXINE: CPT

## 2025-07-28 PROCEDURE — 84443 ASSAY THYROID STIM HORMONE: CPT

## 2025-07-28 PROCEDURE — 99000 SPECIMEN HANDLING OFFICE-LAB: CPT

## 2025-07-28 PROCEDURE — 86800 THYROGLOBULIN ANTIBODY: CPT | Mod: 90

## 2025-08-07 LAB — SCANNED LAB RESULT: ABNORMAL

## 2025-08-14 DIAGNOSIS — C73 PAPILLARY THYROID CARCINOMA (H): ICD-10-CM

## 2025-08-14 DIAGNOSIS — E89.0 POSTOPERATIVE HYPOTHYROIDISM: ICD-10-CM

## 2025-08-14 RX ORDER — LEVOTHYROXINE SODIUM 200 UG/1
TABLET ORAL
Qty: 94 TABLET | Refills: 4 | Status: SHIPPED | OUTPATIENT
Start: 2025-08-14

## (undated) DEVICE — ESU GROUND PAD ADULT W/CORD E7507

## (undated) DEVICE — SPONGE LAP 18X18" X8435

## (undated) DEVICE — DRAIN JACKSON PRATT 10MM FLAT 4/4 PERF SU130-1311

## (undated) DEVICE — Device

## (undated) DEVICE — SPONGE KITTNER 30-101

## (undated) DEVICE — SU SILK 2-0 SH CR 5X18" C0125

## (undated) DEVICE — SUCTION MANIFOLD NEPTUNE 2 SYS 4 PORT 0702-020-000

## (undated) DEVICE — SU ETHILON 3-0 PS-1 18" 1663H

## (undated) DEVICE — SUCTION SLEEVE NEPTUNE 2 125MM 0703-005-125

## (undated) DEVICE — SU PROLENE 5-0 RB-1DA 36"  8556H

## (undated) DEVICE — CLIP HORIZON MED BLUE 002200

## (undated) DEVICE — CLIP HORIZON SM RED WIDE SLOT 001201

## (undated) DEVICE — RETR ELASTIC STAYS LONE STAR BLUNT DUAL LEAD 3550-1G

## (undated) DEVICE — GLOVE BIOGEL PI MICRO SZ 6.0 48560

## (undated) DEVICE — SU MONOCRYL 4-0 P-3 18" UND Y494G

## (undated) DEVICE — DRSG TEGADERM 4X4 3/4" 1626W

## (undated) DEVICE — SU SILK 2-0 TIE 12X30" A305H

## (undated) DEVICE — PRASS STANDARD MONOPOLAR STIMULATOR PROBE

## (undated) DEVICE — LINEN TOWEL PACK X6 WHITE 5487

## (undated) DEVICE — DRAIN JACKSON PRATT RESERVOIR 100ML SU130-1305

## (undated) DEVICE — ADH SKIN CLOSURE PREMIERPRO EXOFIN 1.0ML 3470

## (undated) DEVICE — LINEN GOWN LG 5406

## (undated) DEVICE — LABEL MEDICATION SYSTEM 3303-P

## (undated) DEVICE — SU VICRYL 3-0 SH 8X18" UND J864D

## (undated) DEVICE — PACK NEURO MINOR UMMC SNE32MNMU4

## (undated) DEVICE — SPONGE RAY-TEC 4X8" 7318

## (undated) DEVICE — STRAP UNIVERSAL POSITIONING 2-PIECE 4X47X76" 91-287

## (undated) DEVICE — TUBE ENDOTRACHEAL NIM TRIVANTAGE 7.0MM 8229707

## (undated) DEVICE — SU SILK 3-0 TIE 12X30" A304H

## (undated) DEVICE — LINEN TOWEL PACK X30 5481

## (undated) DEVICE — DRSG TELFA 3X8" 1238

## (undated) DEVICE — PREP SKIN SCRUB TRAY 4461A

## (undated) DEVICE — PREP POVIDONE-IODINE 10% SOLUTION 4OZ BOTTLE MDS093944

## (undated) RX ORDER — FENTANYL CITRATE-0.9 % NACL/PF 10 MCG/ML
PLASTIC BAG, INJECTION (ML) INTRAVENOUS
Status: DISPENSED
Start: 2023-02-21

## (undated) RX ORDER — DEXAMETHASONE SODIUM PHOSPHATE 4 MG/ML
INJECTION, SOLUTION INTRA-ARTICULAR; INTRALESIONAL; INTRAMUSCULAR; INTRAVENOUS; SOFT TISSUE
Status: DISPENSED
Start: 2023-02-21

## (undated) RX ORDER — CEFAZOLIN SODIUM/WATER 2 G/20 ML
SYRINGE (ML) INTRAVENOUS
Status: DISPENSED
Start: 2023-02-21

## (undated) RX ORDER — LIDOCAINE HYDROCHLORIDE 10 MG/ML
INJECTION, SOLUTION EPIDURAL; INFILTRATION; INTRACAUDAL; PERINEURAL
Status: DISPENSED
Start: 2023-02-13

## (undated) RX ORDER — ONDANSETRON 2 MG/ML
INJECTION INTRAMUSCULAR; INTRAVENOUS
Status: DISPENSED
Start: 2023-02-21

## (undated) RX ORDER — LIDOCAINE HYDROCHLORIDE AND EPINEPHRINE 10; 10 MG/ML; UG/ML
INJECTION, SOLUTION INFILTRATION; PERINEURAL
Status: DISPENSED
Start: 2023-02-21

## (undated) RX ORDER — FENTANYL CITRATE 50 UG/ML
INJECTION, SOLUTION INTRAMUSCULAR; INTRAVENOUS
Status: DISPENSED
Start: 2023-02-21

## (undated) RX ORDER — PROPOFOL 10 MG/ML
INJECTION, EMULSION INTRAVENOUS
Status: DISPENSED
Start: 2023-02-21

## (undated) RX ORDER — HYDROMORPHONE HYDROCHLORIDE 1 MG/ML
INJECTION, SOLUTION INTRAMUSCULAR; INTRAVENOUS; SUBCUTANEOUS
Status: DISPENSED
Start: 2023-02-21

## (undated) RX ORDER — SODIUM CHLORIDE 9 MG/ML
INJECTION, SOLUTION INTRAVENOUS
Status: DISPENSED
Start: 2023-02-13